# Patient Record
Sex: FEMALE | Race: BLACK OR AFRICAN AMERICAN | NOT HISPANIC OR LATINO | Employment: UNEMPLOYED | URBAN - METROPOLITAN AREA
[De-identification: names, ages, dates, MRNs, and addresses within clinical notes are randomized per-mention and may not be internally consistent; named-entity substitution may affect disease eponyms.]

---

## 2017-11-08 ENCOUNTER — TELEPHONE (OUTPATIENT)
Dept: GASTROENTEROLOGY | Facility: CLINIC | Age: 52
End: 2017-11-08

## 2017-11-09 ENCOUNTER — OFFICE VISIT (OUTPATIENT)
Dept: GASTROENTEROLOGY | Facility: CLINIC | Age: 52
End: 2017-11-09
Payer: COMMERCIAL

## 2017-11-09 VITALS
HEART RATE: 66 BPM | DIASTOLIC BLOOD PRESSURE: 68 MMHG | BODY MASS INDEX: 49.34 KG/M2 | WEIGHT: 293 LBS | SYSTOLIC BLOOD PRESSURE: 144 MMHG

## 2017-11-09 DIAGNOSIS — Z12.11 SCREENING FOR COLON CANCER: ICD-10-CM

## 2017-11-09 DIAGNOSIS — R10.13 EPIGASTRIC PAIN: Primary | ICD-10-CM

## 2017-11-09 PROCEDURE — 99204 OFFICE O/P NEW MOD 45 MIN: CPT | Mod: S$GLB,,, | Performed by: INTERNAL MEDICINE

## 2017-11-09 PROCEDURE — 99999 PR PBB SHADOW E&M-EST. PATIENT-LVL III: CPT | Mod: PBBFAC,,, | Performed by: INTERNAL MEDICINE

## 2017-11-09 NOTE — PATIENT INSTRUCTIONS
Patient scheduled for EGD/COLONOSCOPY with Dr. Crow on  12/13/2017.  Miralax Split Prep instructions explained to patient . Lab will call two days before with arrival time at [facility]. Make sure to have someone to drive you home after procedure.  Verbalized understanding.

## 2017-11-09 NOTE — PROGRESS NOTES
Subjective:       Patient ID: Raquel Finch is a 52 y.o. female.    Chief Complaint: GI Problem    This is a 52-year-old female here for evaluation of epigastric pain.  She has intermittent epigastric pain, slightly to the left which occurs intermittently lasting for up to an hour.  She does have a history of peptic ulcer disease diagnosed on an EGD about 5 years ago.  Occasional NSAID usage.  No melena, hematochezia.  No other exacerbating or relieving factors or other associated symptoms.  Sharp and moderate intensity.  She also has a history of 3 colon polyps removed 5 years ago with colonoscopy due now.    The following portions of the patient's history were reviewed and updated as appropriate: allergies, current medications, past family history, past medical history, past social history, past surgical history and problem list.    (Portions of this note were dictated using voice recognition software and may contain dictation related errors in spelling/grammar/syntax not found on text review)    HPI  Review of Systems   Constitutional: Negative for appetite change, chills and fever.   HENT: Negative for postnasal drip and trouble swallowing.    Eyes: Negative for pain and redness.   Respiratory: Negative for cough, choking, chest tightness and shortness of breath.    Cardiovascular: Negative for chest pain and leg swelling.   Gastrointestinal: Positive for abdominal pain and constipation. Negative for abdominal distention, anal bleeding, blood in stool, diarrhea and vomiting.   Endocrine: Negative for cold intolerance and heat intolerance.   Genitourinary: Negative for difficulty urinating and hematuria.   Musculoskeletal: Negative for arthralgias and back pain.   Skin: Negative for color change and pallor.   Allergic/Immunologic: Negative for environmental allergies and food allergies.   Neurological: Negative for dizziness and light-headedness.   Hematological: Negative for adenopathy. Does not bruise/bleed  easily.   Psychiatric/Behavioral: Negative for agitation and behavioral problems.       Objective:      Physical Exam   Constitutional: She is oriented to person, place, and time. She appears well-developed and well-nourished. No distress.   HENT:   Head: Normocephalic and atraumatic.   Eyes: Conjunctivae are normal. No scleral icterus.   Neck: Normal range of motion. Neck supple. No tracheal deviation present. No thyromegaly present.   Cardiovascular: Normal rate and regular rhythm.  Exam reveals no gallop and no friction rub.    No murmur heard.  Pulmonary/Chest: Effort normal and breath sounds normal. No respiratory distress. She has no wheezes.   Abdominal: Soft. Bowel sounds are normal. She exhibits no distension. There is no tenderness.   Musculoskeletal:        Right wrist: She exhibits normal range of motion and no tenderness.        Left wrist: She exhibits normal range of motion and no tenderness.   Lymphadenopathy:        Head (right side): No submental and no submandibular adenopathy present.        Head (left side): No submental and no submandibular adenopathy present.   Neurological: She is alert and oriented to person, place, and time.   Skin: Skin is warm and dry. No rash noted. She is not diaphoretic. No erythema.   Psychiatric: She has a normal mood and affect. Her behavior is normal.   Nursing note and vitals reviewed.      Labs; reviewed  Assessment:       1. Epigastric pain    2. Screening for colon cancer        Plan:   1. Minimize NSAID usage  2. PPI  3. Surveillance colonoscopy  4. EGD

## 2017-12-13 ENCOUNTER — HOSPITAL ENCOUNTER (OUTPATIENT)
Facility: HOSPITAL | Age: 52
Discharge: HOME | End: 2017-12-13
Attending: INTERNAL MEDICINE | Admitting: INTERNAL MEDICINE
Payer: COMMERCIAL

## 2017-12-13 ENCOUNTER — ANESTHESIA EVENT (OUTPATIENT)
Dept: ENDOSCOPY | Facility: HOSPITAL | Age: 52
End: 2017-12-13
Payer: COMMERCIAL

## 2017-12-13 ENCOUNTER — ANESTHESIA (OUTPATIENT)
Dept: ENDOSCOPY | Facility: HOSPITAL | Age: 52
End: 2017-12-13
Payer: COMMERCIAL

## 2017-12-13 VITALS
OXYGEN SATURATION: 97 % | SYSTOLIC BLOOD PRESSURE: 122 MMHG | WEIGHT: 293 LBS | DIASTOLIC BLOOD PRESSURE: 78 MMHG | BODY MASS INDEX: 44.41 KG/M2 | HEIGHT: 68 IN | TEMPERATURE: 99 F | RESPIRATION RATE: 20 BRPM | HEART RATE: 76 BPM

## 2017-12-13 DIAGNOSIS — Z12.11 SCREENING FOR MALIGNANT NEOPLASM OF COLON: ICD-10-CM

## 2017-12-13 PROCEDURE — 37000008 HC ANESTHESIA 1ST 15 MINUTES: Performed by: INTERNAL MEDICINE

## 2017-12-13 PROCEDURE — G0105 COLORECTAL SCRN; HI RISK IND: HCPCS | Mod: ,,, | Performed by: INTERNAL MEDICINE

## 2017-12-13 PROCEDURE — 88305 TISSUE EXAM BY PATHOLOGIST: CPT | Mod: 26,,, | Performed by: PATHOLOGY

## 2017-12-13 PROCEDURE — 25000003 PHARM REV CODE 250: Performed by: NURSE ANESTHETIST, CERTIFIED REGISTERED

## 2017-12-13 PROCEDURE — 43239 EGD BIOPSY SINGLE/MULTIPLE: CPT | Mod: 51,,, | Performed by: INTERNAL MEDICINE

## 2017-12-13 PROCEDURE — 37000009 HC ANESTHESIA EA ADD 15 MINS: Performed by: INTERNAL MEDICINE

## 2017-12-13 PROCEDURE — 43239 EGD BIOPSY SINGLE/MULTIPLE: CPT | Performed by: INTERNAL MEDICINE

## 2017-12-13 PROCEDURE — 25000003 PHARM REV CODE 250: Performed by: INTERNAL MEDICINE

## 2017-12-13 PROCEDURE — 88305 TISSUE EXAM BY PATHOLOGIST: CPT | Performed by: PATHOLOGY

## 2017-12-13 PROCEDURE — G0121 COLON CA SCRN NOT HI RSK IND: HCPCS | Performed by: INTERNAL MEDICINE

## 2017-12-13 PROCEDURE — 63600175 PHARM REV CODE 636 W HCPCS: Performed by: NURSE ANESTHETIST, CERTIFIED REGISTERED

## 2017-12-13 PROCEDURE — G0105 COLORECTAL SCRN; HI RISK IND: HCPCS | Performed by: INTERNAL MEDICINE

## 2017-12-13 PROCEDURE — 27201012 HC FORCEPS, HOT/COLD, DISP: Performed by: INTERNAL MEDICINE

## 2017-12-13 RX ORDER — SODIUM CHLORIDE 9 MG/ML
INJECTION, SOLUTION INTRAVENOUS CONTINUOUS
Status: DISCONTINUED | OUTPATIENT
Start: 2017-12-13 | End: 2017-12-13 | Stop reason: HOSPADM

## 2017-12-13 RX ORDER — PROPOFOL 10 MG/ML
VIAL (ML) INTRAVENOUS CONTINUOUS PRN
Status: DISCONTINUED | OUTPATIENT
Start: 2017-12-13 | End: 2017-12-13

## 2017-12-13 RX ORDER — OMEPRAZOLE 40 MG/1
40 CAPSULE, DELAYED RELEASE ORAL DAILY
Qty: 30 CAPSULE | Refills: 11 | Status: SHIPPED | OUTPATIENT
Start: 2017-12-13 | End: 2018-01-22

## 2017-12-13 RX ORDER — PROPOFOL 10 MG/ML
VIAL (ML) INTRAVENOUS
Status: DISCONTINUED | OUTPATIENT
Start: 2017-12-13 | End: 2017-12-13

## 2017-12-13 RX ORDER — GLYCOPYRROLATE 0.2 MG/ML
INJECTION INTRAMUSCULAR; INTRAVENOUS
Status: DISCONTINUED | OUTPATIENT
Start: 2017-12-13 | End: 2017-12-13

## 2017-12-13 RX ORDER — LIDOCAINE HCL/PF 100 MG/5ML
SYRINGE (ML) INTRAVENOUS
Status: DISCONTINUED | OUTPATIENT
Start: 2017-12-13 | End: 2017-12-13

## 2017-12-13 RX ADMIN — GLYCOPYRROLATE 0.2 MG: 0.2 INJECTION, SOLUTION INTRAMUSCULAR; INTRAVENOUS at 09:12

## 2017-12-13 RX ADMIN — LIDOCAINE HYDROCHLORIDE 60 MG: 20 INJECTION, SOLUTION INTRAVENOUS at 09:12

## 2017-12-13 RX ADMIN — SODIUM CHLORIDE: 0.9 INJECTION, SOLUTION INTRAVENOUS at 09:12

## 2017-12-13 RX ADMIN — TOPICAL ANESTHETIC 1 EACH: 200 SPRAY DENTAL; PERIODONTAL at 09:12

## 2017-12-13 RX ADMIN — PROPOFOL 70 MG: 10 INJECTION, EMULSION INTRAVENOUS at 09:12

## 2017-12-13 RX ADMIN — PROPOFOL 150 MCG/KG/MIN: 10 INJECTION, EMULSION INTRAVENOUS at 09:12

## 2017-12-13 NOTE — ANESTHESIA PREPROCEDURE EVALUATION
12/13/2017  Raquel Finch is a 52 y.o., female.    Anesthesia Evaluation      I have reviewed the Medications.     Review of Systems  Anesthesia Hx:  No problems with previous Anesthesia Denies Family Hx of Anesthesia complications.    Cardiovascular:   Hypertension    Pulmonary:   Asthma mild        Physical Exam  General:  Well nourished, Obesity    Airway/Jaw/Neck:  Airway Findings: Mouth Opening: Normal Tongue: Normal  General Airway Assessment: Adult  Mallampati: II      Dental:  Dental Findings: In tact    Chest/Lungs:  Chest/Lungs Findings: Clear to auscultation, Normal Respiratory Rate     Heart/Vascular:  Heart Findings: Rate: Normal  Rhythm: Regular Rhythm        Mental Status:  Mental Status Findings:  Cooperative, Alert and Oriented         Anesthesia Plan  Type of Anesthesia, risks & benefits discussed:  Anesthesia Type:  MAC  Patient's Preference: MAC  Intra-op Monitoring Plan:   Intra-op Monitoring Plan Comments:   Post Op Pain Control Plan:   Post Op Pain Control Plan Comments:   Induction:   IV  Beta Blocker:         Informed Consent: Patient understands risks and agrees with Anesthesia plan.  Questions answered. Anesthesia consent signed with patient.  ASA Score: 2     Day of Surgery Review of History & Physical:            Ready For Surgery From Anesthesia Perspective.

## 2017-12-13 NOTE — H&P
[facility]  Gastroenterology  H&P    Patient Name: Raquel Finch  MRN: [id number]  Admission Date: 12/13/2017  Code Status: No Order    Attending Provider: Laxmi Crow MD   Primary Care Physician: Fan Rivera PharmD  Principal Problem:<principal problem not specified>    Subjective:     History of Present Illness: Epigastric pain, colon cancer screening    Past Medical History:   Diagnosis Date    Anemia     Elevated CPK     Hypertension        No past surgical history on file.    Review of patient's allergies indicates:  No Known Allergies  Family History     None        Social History Main Topics    Smoking status: Never Smoker    Smokeless tobacco: Never Used    Alcohol use Yes    Drug use: No    Sexual activity: Not on file     Review of Systems   Constitutional: Negative for activity change and appetite change.   Cardiovascular: Negative for chest pain.   Gastrointestinal: Positive for abdominal pain. Negative for constipation.     Objective:     Vital Signs (Most Recent):    Vital Signs (24h Range):           There is no height or weight on file to calculate BMI.    No intake or output data in the 24 hours ending 12/13/17 0836    Lines/Drains/Airways          No matching active lines, drains, or airways          Physical Exam   Constitutional: She is oriented to person, place, and time. She appears well-developed and well-nourished. No distress.   HENT:   Head: Normocephalic and atraumatic.   Eyes: Conjunctivae are normal. No scleral icterus.   Neck: Normal range of motion. Neck supple. No tracheal deviation present. No thyromegaly present.   Cardiovascular: Normal rate and regular rhythm.  Exam reveals no gallop and no friction rub.    No murmur heard.  Pulmonary/Chest: Effort normal and breath sounds normal. No respiratory distress. She has no wheezes.   Abdominal: Soft. Bowel sounds are normal. She exhibits no distension. There is no tenderness.   Musculoskeletal:         Right wrist: She exhibits normal range of motion and no tenderness.        Left wrist: She exhibits normal range of motion and no tenderness.   Lymphadenopathy:        Head (right side): No submental and no submandibular adenopathy present.        Head (left side): No submental and no submandibular adenopathy present.   Neurological: She is alert and oriented to person, place, and time.   Skin: Skin is warm and dry. No rash noted. She is not diaphoretic. No erythema.   Psychiatric: She has a normal mood and affect. Her behavior is normal.   Nursing note and vitals reviewed.          Assessment/Plan:     Active Diagnoses:    Diagnosis Date Noted POA    Screening for malignant neoplasm of colon [Z12.11] 12/13/2017 Not Applicable      Problems Resolved During this Admission:    Diagnosis Date Noted Date Resolved POA       1. Screening colonoscopy  2. EGD for epigastric pain    Laxmi Crow MD  Gastroenterology  [facility]

## 2017-12-13 NOTE — DISCHARGE INSTRUCTIONS
Discharge Summary/Instructions for EGD and Colonoscopy  Raquel LITTLE [facility]  12/13/2017  Laxmi Crow MD    Restrictions on Activity:    - Do not drive car or operate machinery until the day after the procedure.  - The following day: return to full activity including work.  - For 3 days: No heavy lifting, straining or running.  - Diet: Eat and drink normally unless instructed otherwise.    Treatment for Common Side Effects:  - Mild abdominal pain and bloating or excessive gas: rest, eat lightly and use a heating pad.   -Sore Throat - treat with throat lozenges, gargle with warm salt water.    Symptoms to watch for and report to your physician:  1. Severe abdominal pain.  2. Fever within 24 hours after a procedure.  3. A large amount of rectal bleeding. (A small amount of blood from the rectum is not serious, especially if hemorrhoids are present.)  4. Because air was put into your colon during the procedure, expelling large amount of air from your rectum is normal.  5. You may not have a bowel movement for 1-3 days because of the colonoscopy prep. This is normal.  6. Go directly to the emergency room if you notice any of the following:     Chills and/orfever over 101   Persistent vomiting   Severe abdominal pain, other than gas cramps   Severe chest pain   Black, tarry stools   Any bleeding - exceeding one tablespoon    If you have any questions or problems, please call your Physician:    Laxmi Crow MD    Lab Results: Contact Physician's Office    If a complication or emergency situation arises and you are unable to reach your Physician - GO TO THE EMERGENCY ROOM.

## 2017-12-13 NOTE — ANESTHESIA POSTPROCEDURE EVALUATION
"Anesthesia Post Evaluation    Patient: Raquel Finch    Procedure(s) Performed: Procedure(s) (LRB):  ESOPHAGOGASTRODUODENOSCOPY (EGD) (N/A)  COLONOSCOPY (N/A)    Final Anesthesia Type: MAC  Patient location during evaluation: GI PACU  Patient participation: Yes- Able to Participate  Level of consciousness: awake and alert and oriented  Pain management: adequate  Airway patency: patent  PONV status at discharge: No PONV  Anesthetic complications: no      Cardiovascular status: blood pressure returned to baseline and hemodynamically stable  Respiratory status: unassisted, room air and spontaneous ventilation  Hydration status: euvolemic  Follow-up not needed.        Visit Vitals  /80 (BP Location: Right arm, Patient Position: Lying)   Pulse 78   Temp 36.5 °C (97.7 °F) (Oral)   Resp 20   Ht 5' 8" (1.727 m)   Wt (!) 144.7 kg (319 lb)   SpO2 97%   Breastfeeding? No   BMI 48.50 kg/m²       Pain/Grayson Score: Pain Assessment Performed: Yes (12/13/2017 10:19 AM)  Presence of Pain: denies (12/13/2017 10:19 AM)  Grayson Score: 10 (12/13/2017 10:19 AM)      "

## 2017-12-26 ENCOUNTER — TELEPHONE (OUTPATIENT)
Dept: GASTROENTEROLOGY | Facility: CLINIC | Age: 52
End: 2017-12-26

## 2017-12-26 DIAGNOSIS — K25.9 GASTRIC ULCER, UNSPECIFIED CHRONICITY, UNSPECIFIED WHETHER GASTRIC ULCER HEMORRHAGE OR PERFORATION PRESENT: Primary | ICD-10-CM

## 2017-12-26 NOTE — TELEPHONE ENCOUNTER
----- Message from Amrita Kendrick sent at 12/26/2017 12:23 PM CST -----  Contact: Self. [phone] Leave detailed message  Patient is returning your call. She wants the procedure scheduled for 1/11/18 and has additional questions. Please advise.

## 2017-12-26 NOTE — TELEPHONE ENCOUNTER
Informed pt of her Bx results. Pt has c/o acid reflux after procedure. Pt has been advised to take PPI. Pt has a f/u scheduled on 1-4-18 with MD. She has also been scheduled for a repeat EGD on 1-23-18 with Dr. Crow. Verbal Understanding.

## 2017-12-26 NOTE — TELEPHONE ENCOUNTER
----- Message from Laxmi Crow MD sent at 12/22/2017  1:30 PM CST -----  H.pylori negative, suspect ulcer due to NSAIDs. Continue PPI. Repeat EGD in 4-6 weeks to ensure ulcer healing. If still with pain a ruq u/s can be done as well

## 2017-12-26 NOTE — TELEPHONE ENCOUNTER
Left a detailed message informing pt of her Pathology results. Pt will need to be scheduled for a repeat EGD in 4-6 weeks.

## 2017-12-26 NOTE — TELEPHONE ENCOUNTER
----- Message from Ailyn Espino sent at 12/26/2017 10:40 AM CST -----  Contact: Self/ [phone]  Patient called in returning your call. She asked to please leave a detail message since she is not able to answer her phone while at work.     Please call and advise.

## 2018-01-08 ENCOUNTER — TELEPHONE (OUTPATIENT)
Dept: GASTROENTEROLOGY | Facility: CLINIC | Age: 53
End: 2018-01-08

## 2018-01-08 NOTE — TELEPHONE ENCOUNTER
Patient continues to have reflux symptoms not alleviated by omeprazole.  Rescheduled EGD to 01/11/2018.

## 2018-01-11 ENCOUNTER — ANESTHESIA EVENT (OUTPATIENT)
Dept: ENDOSCOPY | Facility: HOSPITAL | Age: 53
End: 2018-01-11
Payer: COMMERCIAL

## 2018-01-11 ENCOUNTER — ANESTHESIA (OUTPATIENT)
Dept: ENDOSCOPY | Facility: HOSPITAL | Age: 53
End: 2018-01-11
Payer: COMMERCIAL

## 2018-01-11 ENCOUNTER — SURGERY (OUTPATIENT)
Age: 53
End: 2018-01-11

## 2018-01-11 ENCOUNTER — HOSPITAL ENCOUNTER (OUTPATIENT)
Facility: HOSPITAL | Age: 53
Discharge: HOME | End: 2018-01-11
Attending: INTERNAL MEDICINE | Admitting: INTERNAL MEDICINE
Payer: COMMERCIAL

## 2018-01-11 DIAGNOSIS — Z12.11 SCREENING FOR MALIGNANT NEOPLASM OF COLON: ICD-10-CM

## 2018-01-11 PROCEDURE — 43239 EGD BIOPSY SINGLE/MULTIPLE: CPT | Performed by: INTERNAL MEDICINE

## 2018-01-11 PROCEDURE — 43239 EGD BIOPSY SINGLE/MULTIPLE: CPT | Mod: ,,, | Performed by: INTERNAL MEDICINE

## 2018-01-11 PROCEDURE — 25000003 PHARM REV CODE 250: Performed by: NURSE ANESTHETIST, CERTIFIED REGISTERED

## 2018-01-11 PROCEDURE — 37000008 HC ANESTHESIA 1ST 15 MINUTES: Performed by: INTERNAL MEDICINE

## 2018-01-11 PROCEDURE — 63600175 PHARM REV CODE 636 W HCPCS: Performed by: NURSE ANESTHETIST, CERTIFIED REGISTERED

## 2018-01-11 PROCEDURE — 88305 TISSUE EXAM BY PATHOLOGIST: CPT | Mod: 26,,, | Performed by: PATHOLOGY

## 2018-01-11 PROCEDURE — 88305 TISSUE EXAM BY PATHOLOGIST: CPT | Performed by: PATHOLOGY

## 2018-01-11 PROCEDURE — 27201012 HC FORCEPS, HOT/COLD, DISP: Performed by: INTERNAL MEDICINE

## 2018-01-11 PROCEDURE — 37000009 HC ANESTHESIA EA ADD 15 MINS: Performed by: INTERNAL MEDICINE

## 2018-01-11 PROCEDURE — 25000003 PHARM REV CODE 250: Performed by: INTERNAL MEDICINE

## 2018-01-11 RX ORDER — PROPOFOL 10 MG/ML
VIAL (ML) INTRAVENOUS
Status: DISCONTINUED | OUTPATIENT
Start: 2018-01-11 | End: 2018-01-11

## 2018-01-11 RX ORDER — LIDOCAINE HCL/PF 100 MG/5ML
SYRINGE (ML) INTRAVENOUS
Status: DISCONTINUED | OUTPATIENT
Start: 2018-01-11 | End: 2018-01-11

## 2018-01-11 RX ORDER — SODIUM CHLORIDE 9 MG/ML
INJECTION, SOLUTION INTRAVENOUS CONTINUOUS
Status: DISCONTINUED | OUTPATIENT
Start: 2018-01-11 | End: 2018-01-11 | Stop reason: HOSPADM

## 2018-01-11 RX ORDER — KETAMINE HYDROCHLORIDE 50 MG/ML
INJECTION, SOLUTION INTRAMUSCULAR; INTRAVENOUS
Status: DISCONTINUED | OUTPATIENT
Start: 2018-01-11 | End: 2018-01-11

## 2018-01-11 RX ORDER — PROPOFOL 10 MG/ML
VIAL (ML) INTRAVENOUS CONTINUOUS PRN
Status: DISCONTINUED | OUTPATIENT
Start: 2018-01-11 | End: 2018-01-11

## 2018-01-11 RX ADMIN — TOPICAL ANESTHETIC 1 EACH: 200 SPRAY DENTAL; PERIODONTAL at 09:01

## 2018-01-11 RX ADMIN — PROPOFOL 150 MCG/KG/MIN: 10 INJECTION, EMULSION INTRAVENOUS at 09:01

## 2018-01-11 RX ADMIN — PROPOFOL 50 MG: 10 INJECTION, EMULSION INTRAVENOUS at 09:01

## 2018-01-11 RX ADMIN — SODIUM CHLORIDE: 0.9 INJECTION, SOLUTION INTRAVENOUS at 09:01

## 2018-01-11 RX ADMIN — LIDOCAINE HYDROCHLORIDE 80 MG: 20 INJECTION, SOLUTION INTRAVENOUS at 09:01

## 2018-01-11 RX ADMIN — LIDOCAINE HYDROCHLORIDE 100 MG: 20 INJECTION, SOLUTION INTRAVENOUS at 09:01

## 2018-01-11 RX ADMIN — KETAMINE HYDROCHLORIDE 50 MG: 50 INJECTION, SOLUTION, CONCENTRATE INTRAMUSCULAR; INTRAVENOUS at 09:01

## 2018-01-11 NOTE — PLAN OF CARE
Past Medical History:   Diagnosis Date    Anemia     Arthritis     Colon polyps 04/2012    Elevated CPK     Encounter for blood transfusion     Hypertension     Kidney stones     PUD (peptic ulcer disease)      Accompanied by sister, ok to speak to her re results of exam.

## 2018-01-11 NOTE — PLAN OF CARE
Past Medical History:   Diagnosis Date    Anemia     Arthritis     Colon polyps 04/2012    Elevated CPK     Encounter for blood transfusion     Hypertension     Kidney stones     PUD (peptic ulcer disease)      Dr. Crow visited at bedside, discussed findings and recommendations with patient and family member; all questions asked and answered. Verbalized understanding of information give. Handout provided at time of discharge.

## 2018-01-11 NOTE — DISCHARGE INSTRUCTIONS
Post EGD  Discharge Instructions:     Raquel LITTLE [facility]  1/11/2018  Laxmi Crow MD    RESTRICTIONS ON ACTIVITY:  -DO NOT drive a car or operate machinery, make critical decisions, or do activities that require coordination or balance for 24 hours.  -Following Day: Return to full activities including work.  -Diet: Eat and drink normally unless instructed otherwise.    TREATMENT FOR COMMON SIDE EFFECTS:  *Sore Throat - treat with throat lozenges, gargle with warm salt water.  *Mild abdominal pain & bloating- rest and take liquids only.    SYMPTOMS TO WATCH FOR AND REPORT TO YOUR PHYSICIAN:  1. Chills or fever occurring 24 hours after procedure.  2. Pain in chest.  3. SEVERE abdominal pain or bloating.  4. Rectal bleeding which could be maroon or black.    If you have any questions or problems, please call your Physician:    Laxmi Crow MD      If a complication or emergency situation arises and you are unable to reach your Physician - GO TO THE EMERGENCY ROOM.

## 2018-01-11 NOTE — H&P (VIEW-ONLY)
[facility]  Gastroenterology  H&P    Patient Name: Raquel Finch  MRN: [id number]  Admission Date: 12/13/2017  Code Status: No Order    Attending Provider: Laxmi Crow MD   Primary Care Physician: Fan Rivera PharmD  Principal Problem:<principal problem not specified>    Subjective:     History of Present Illness: Epigastric pain, colon cancer screening    Past Medical History:   Diagnosis Date    Anemia     Elevated CPK     Hypertension        No past surgical history on file.    Review of patient's allergies indicates:  No Known Allergies  Family History     None        Social History Main Topics    Smoking status: Never Smoker    Smokeless tobacco: Never Used    Alcohol use Yes    Drug use: No    Sexual activity: Not on file     Review of Systems   Constitutional: Negative for activity change and appetite change.   Cardiovascular: Negative for chest pain.   Gastrointestinal: Positive for abdominal pain. Negative for constipation.     Objective:     Vital Signs (Most Recent):    Vital Signs (24h Range):           There is no height or weight on file to calculate BMI.    No intake or output data in the 24 hours ending 12/13/17 0836    Lines/Drains/Airways          No matching active lines, drains, or airways          Physical Exam   Constitutional: She is oriented to person, place, and time. She appears well-developed and well-nourished. No distress.   HENT:   Head: Normocephalic and atraumatic.   Eyes: Conjunctivae are normal. No scleral icterus.   Neck: Normal range of motion. Neck supple. No tracheal deviation present. No thyromegaly present.   Cardiovascular: Normal rate and regular rhythm.  Exam reveals no gallop and no friction rub.    No murmur heard.  Pulmonary/Chest: Effort normal and breath sounds normal. No respiratory distress. She has no wheezes.   Abdominal: Soft. Bowel sounds are normal. She exhibits no distension. There is no tenderness.   Musculoskeletal:         Right wrist: She exhibits normal range of motion and no tenderness.        Left wrist: She exhibits normal range of motion and no tenderness.   Lymphadenopathy:        Head (right side): No submental and no submandibular adenopathy present.        Head (left side): No submental and no submandibular adenopathy present.   Neurological: She is alert and oriented to person, place, and time.   Skin: Skin is warm and dry. No rash noted. She is not diaphoretic. No erythema.   Psychiatric: She has a normal mood and affect. Her behavior is normal.   Nursing note and vitals reviewed.          Assessment/Plan:     Active Diagnoses:    Diagnosis Date Noted POA    Screening for malignant neoplasm of colon [Z12.11] 12/13/2017 Not Applicable      Problems Resolved During this Admission:    Diagnosis Date Noted Date Resolved POA       1. Screening colonoscopy  2. EGD for epigastric pain    Laxmi Crow MD  Gastroenterology  [facility]

## 2018-01-11 NOTE — TRANSFER OF CARE
"Anesthesia Transfer of Care Note    Patient: Raquel Finch    Procedure(s) Performed: Procedure(s) (LRB):  ESOPHAGOGASTRODUODENOSCOPY (EGD) (N/A)    Patient location: GI    Anesthesia Type: MAC    Transport from OR: Transported from OR on room air with adequate spontaneous ventilation    Post pain: adequate analgesia    Post assessment: no apparent anesthetic complications and tolerated procedure well    Post vital signs: stable    Level of consciousness: awake, alert and oriented    Nausea/Vomiting: no nausea/vomiting    Complications: none          Last vitals:   Visit Vitals  /84   Pulse (!) 58   Temp 36.4 °C (97.6 °F) (Oral)   Resp (!) 23   Ht 5' 9" (1.753 m)   Wt (!) 144.7 kg (319 lb)   SpO2 96%   Breastfeeding? No   BMI 47.11 kg/m²     "

## 2018-01-11 NOTE — ANESTHESIA POSTPROCEDURE EVALUATION
"Anesthesia Post Evaluation    Patient: Raquel Finch    Procedure(s) Performed: Procedure(s) (LRB):  ESOPHAGOGASTRODUODENOSCOPY (EGD) (N/A)    Final Anesthesia Type: MAC  Patient location during evaluation: GI PACU  Patient participation: Yes- Able to Participate  Level of consciousness: awake and alert and oriented  Post-procedure vital signs: reviewed and stable  Pain management: adequate  Airway patency: patent  PONV status at discharge: No PONV  Anesthetic complications: no      Cardiovascular status: blood pressure returned to baseline and hemodynamically stable  Respiratory status: unassisted  Hydration status: euvolemic  Follow-up not needed.        Visit Vitals  /84   Pulse (!) 58   Temp 36.4 °C (97.6 °F) (Oral)   Resp (!) 23   Ht 5' 9" (1.753 m)   Wt (!) 144.7 kg (319 lb)   SpO2 96%   Breastfeeding? No   BMI 47.11 kg/m²       Pain/Grayson Score: Presence of Pain: denies (1/11/2018  8:46 AM)      "

## 2018-01-12 VITALS
WEIGHT: 293 LBS | HEART RATE: 57 BPM | DIASTOLIC BLOOD PRESSURE: 78 MMHG | OXYGEN SATURATION: 99 % | SYSTOLIC BLOOD PRESSURE: 149 MMHG | HEIGHT: 69 IN | BODY MASS INDEX: 43.4 KG/M2 | TEMPERATURE: 98 F | RESPIRATION RATE: 20 BRPM

## 2018-01-17 ENCOUNTER — TELEPHONE (OUTPATIENT)
Dept: GASTROENTEROLOGY | Facility: CLINIC | Age: 53
End: 2018-01-17

## 2018-01-17 NOTE — TELEPHONE ENCOUNTER
Results given to patient. Patient verbalized understanding. Will continue omeprazole for now.  Will call back if needed.

## 2018-01-17 NOTE — TELEPHONE ENCOUNTER
----- Message from Laxmi Crow MD sent at 1/17/2018 12:18 PM CST -----  Mild chronic esophageal inflammation; peptic ulcer disease has healed. If still with symptoms I'd change her omeprazole to pantoprazole or nexium and f/u in 4 weeks

## 2018-01-22 ENCOUNTER — TELEPHONE (OUTPATIENT)
Dept: GASTROENTEROLOGY | Facility: CLINIC | Age: 53
End: 2018-01-22

## 2018-01-22 RX ORDER — ESOMEPRAZOLE MAGNESIUM 40 MG/1
40 CAPSULE, DELAYED RELEASE ORAL
COMMUNITY
End: 2020-12-23 | Stop reason: CLARIF

## 2018-02-15 ENCOUNTER — TELEPHONE (OUTPATIENT)
Dept: GASTROENTEROLOGY | Facility: CLINIC | Age: 53
End: 2018-02-15

## 2018-02-15 NOTE — TELEPHONE ENCOUNTER
Informed patient that Dr. Crow does not perform Gastric Sleeve surgery.  Patient will check with PCP for referral. Reflux symptoms are better but continues to have symptoms. Will call back in a few weeks if she would like an appointment.

## 2018-02-15 NOTE — TELEPHONE ENCOUNTER
----- Message from Samara Cool sent at 2/15/2018 12:03 PM CST -----  Contact: [phone]/self  Patient requesting to speak with you regarding a referral. Please advise.

## 2018-06-28 ENCOUNTER — NUTRITION (OUTPATIENT)
Dept: NUTRITION | Facility: CLINIC | Age: 53
End: 2018-06-28
Payer: COMMERCIAL

## 2018-06-28 DIAGNOSIS — E66.9 OBESITY, UNSPECIFIED CLASSIFICATION, UNSPECIFIED OBESITY TYPE, UNSPECIFIED WHETHER SERIOUS COMORBIDITY PRESENT: ICD-10-CM

## 2018-06-28 DIAGNOSIS — I10 HYPERTENSION, UNSPECIFIED TYPE: ICD-10-CM

## 2018-06-28 DIAGNOSIS — Z71.3 DIETARY COUNSELING AND SURVEILLANCE: Primary | ICD-10-CM

## 2018-06-28 PROCEDURE — 97802 MEDICAL NUTRITION INDIV IN: CPT | Mod: S$GLB,,, | Performed by: DIETITIAN, REGISTERED

## 2018-06-28 NOTE — PROGRESS NOTES
Nutrition Assessment for Medical Nutrition Therapy    Referring professional: Dr. Neves for Gastric Sleeve   Tentatively scheduled for 2nd or 3rd week in November     Reason for MNT visit: Pt in for education and nutrition counseling regarding dietary assessment, education & counseling regarding obesity and need to lose weight along with pre-gastric sleeve lifestyle modifications and dietary guidance.     Medical History: HTN per patient     Medications:   Current Outpatient Prescriptions:     esomeprazole (NEXIUM) 40 MG capsule, Take 40 mg by mouth before breakfast., Disp: , Rfl:     hydrochlorothiazide (HYDRODIURIL) 12.5 MG Tab, Take 12.5 mg by mouth once daily., Disp: , Rfl:     montelukast (SINGULAIR) 10 mg tablet, Take 10 mg by mouth every evening., Disp: , Rfl:     naproxen (NAPROSYN) 500 MG tablet, Take 1 tablet (500 mg total) by mouth 2 (two) times daily with meals., Disp: 20 tablet, Rfl: 0    nebivolol (BYSTOLIC) 10 MG Tab, Take 10 mg by mouth once daily., Disp: , Rfl:     Labs: CMP from 2017 shows elevated glucose at 133    Wt: 323 lbs   She reports losing 3 lbs since April and shares that she's gained 100 lbs in the last 10 years.     Ht:  5'8    BMI: 49.1        Nutrition History       Food allergies  intolerances: pork - headache     Dining out: very frequent    Beverages:   2-3 cokes per day  2 crystal light hui during the day (16 ounces each)  Another bottle of water that night    Typical day recall:    Up between 5:30-5:45am    Working 5 days per week, which has changed in the last two weeks as it used to be 4- 10 hour days.    8:15-8:30am breakfast: 2 boiled eggs + 3-4 strips of turkey lorenzana in last 2 weeks before was grits, eggs, sausage ramiro from [employer] or [employer]    12:45-1:15pm  Lunch: quiznos honey mustard 1/2 salad sometimes baked sweet potato brought from home  [place] chicken salad with strawberries   Poppeyes: 3 wings + red beans + coke   Chicken derrell a  Sushi      chocolatae pudding or activia or starbucks mocha coffee if have a snack- but then doesn't eat lunch but then starving in the evening     6-6:30pm   Home: whatever  brings to eat   Or   when get home if  is out of town  ()  Seafood platter  Fried chicken, pork chop + scrambled egg   Baked chicken, boiled turkey necks, if cooking at home   3 times per week out    Not big on rice, maybe 1-2 times per month    7-8:30 bed      Supplements:  Fish oil, cod liver oil pill, B12, flax seed,     Recommendations/Interventions:   Reminders:   ? Try to eat breakfast within 1-2 hours of waking up  ? Try not to skip any meals or snacks, not going more than 3-4 hours without eating.   ? At each meal and snack, try to include a source of fiber + lean protein + healthy fat.   ? Consider limiting carbohydrates 15-20 grams (1 serving) per meal and snack, omitting as able throughout meals and snacks each day, especially as surgery approaches    ? Eliminate sugary + calorie-rich beverages like cokes. Alternatives recommended/discussed   ? Reduce eating out to include making more foods at home also needing to meal plan, grocery shop etc  ? Exercise 60 minutes most days  ? Aim for a minimum of 7-8 hours sleep nightly    Breakfast    Protein drink   o Orgain, Premier, or Iconic   2 eggs muffins    Any snack option    Snacks  Low carb:   Chicken salad + celery or cucumbers   Tuna salad + carrots   Hard boiled eggs   Deviled eggs   Protein shake with protein powder or Ready-to-drink beverage + ice + unsweetened almond milk blended   Nutrient-rich carb options:   Fruit of choice (1 piece or size of a tennis ball, i.e. orange, pear, peach, etc or 1 cup melon/berries) + protein:  o nuts (2 tablespoons or 100 calorie pack)   o nut butter (1 tablespoon)  o cheese (reduced fat, light, part-skim, 2% cheese options)  o beef jerky (see grocery list for recommended brands)  o hard boiled egg/s (1 yolk)    Flavored  Greek Yogurt   o Chobani simply 100  o Dannon oikos triple zero  o Chobani simply 100 crunch   10-12 Beanitos + salsa or laughing cow wedge or 100 calorie guacamole/avocado cup    Chocolate protein muffins  Grab-n-go:  Protein bar    o Nature Valley Protein Bar  o Quest  o Powercrunch  o Detour SMART   o Think Thin  Chocolate protein muffins    lunch (leftovers) + dinner    4- 6 ounces lean protein + unlimited non starchy veggie + optional fiber-rich carb choice (really omitting this as able over the next few months to prep for surgery)  Plate method portions shown for protein 1st, veggies 2nd, and carb last if at all    Click hyperlinks for sample recipes     Broccoli + Chicken tami - made with zucchini noodles instead of pasta + greek yogurt- lemon- black pepper- instead of tami (similar and here)    Bell pepper nachos    Rut[place] Manuels Pie    Pot Roast    Slow Cooker:  o Flank Steak Fajitas   o Pulled Pork Tenderloin: with or slaw or salad - the slaw is slightly tangy with the Greek yogurt + kale. Can use a store-bought version   o Shredded Chicken Tacos       Lagniappe/Beverages:   Crystal Light Pure   True Lemon   Powerade Zero   Vitaminwater zero      Written Materials Provided  These resources are intended to assist the patient in making it easier to choose recommended options when eating out & to identify better-for-you brands at the grocery store:     Customized initial meal plan    Eat Fit flor card as a reminder to download the flor to ones smart phone which provides: current Eat Fit partners, approved menu options, food labels for carb counting, & recipes    Fast Food Lite Guide   Eat Fit Grocery Product list    RD contact information  [facility] Nutrition Book       Comprehension: good ; patient verbalizes understanding of the dietary recommendations discussed as well as the need for additional follow ups to monitor progress.     Motivation to change: moderate-  high     Follow-up: 6-8 weeks to assess meal plan adherence, weight loss progress, exercise choices, beverage swaps, meal planning and preparing vs eating out, and protein, carb and fat intake    Counseling time: 1 hour 45 minutes

## 2018-07-02 NOTE — PATIENT INSTRUCTIONS
Reminders:   ? Try to eat breakfast within 1-2 hours of waking up  ? Try not to skip any meals or snacks, not going more than 3-4 hours without eating.   ? At each meal and snack, try to include a source of fiber + lean protein + healthy fat.   ? Consider limiting carbohydrates 15-20 grams (1 serving) per meal and snack, omitting as able throughout meals and snacks, especially as surgery approaches    ? Eliminate the sugary + calorie-rich beverages like cokes. Alternatives recommended at the bottom   ? Reduce eating out to include making more foods at home also needing to meal plan, grocery shop etc.   ? Exercise 60 minutes most days  ? Aim for a minimum of 7-8 hours sleep nightly    Breakfast    Protein drink   o Orgain, Premier, or Iconic   2 eggs muffins    Any snack option    Snacks  Low carb:   Chicken salad + celery or cucumbers   Tuna salad + carrots   Hard boiled eggs   Deviled eggs   Protein shake with protein powder or Ready-to-drink beverage + ice + unsweetened almond milk blended   Nutrient-rich carb options:   Fruit of choice (1 piece or size of a tennis ball, i.e. orange, pear, peach, etc or 1 cup melon/berries) + protein:  o nuts (2 tablespoons or 100 calorie pack)   o nut butter (1 tablespoon)  o cheese (reduced fat, light, part-skim, 2% cheese options)  o beef jerky (see grocery list for recommended brands)  o hard boiled egg/s (1 yolk)    Flavored Greek Yogurt   o Chobani simply 100  o Dannon oikos triple zero  o Chobani simply 100 crunch   10-12 Beanitos + salsa or laughing cow wedge or 100 calorie guacamole/avocado cup    Chocolate protein muffins  Grab-n-go:  Protein bar    o Nature Valley Protein Bar  o Quest  o Powercrunch  o Detour SMART   o Think Thin      lunch (leftovers) + dinner    4- 6 ounces lean protein + unlimited non starchy veggie + optional fiber-rich carb choice (really omitting this as able over the next few months to prep for surgery)    Click hyperlinks for sample  recipes     Broccoli + Chicken tami - made with zucchini noodles instead of pasta + greek yogurt- lemon- black pepper- instead of tami (similar and here)    Bell pepper nachos    [place] Manuels Pie    Pot Roast    Slow Cooker:  o Flank Steak Neftali   o Pulled Pork Tenderloin: with or slaw or salad - the slaw is slightly tangy with the Greek yogurt + kale. Can use a store-bought version   o Shredded Chicken Tacos       Lagniappe:   Crystal Light Pure   True Lemon   Powerade Zero   Vitaminwater zero   Chocolate protein muffins

## 2018-08-08 ENCOUNTER — TELEPHONE (OUTPATIENT)
Dept: GASTROENTEROLOGY | Facility: HOSPITAL | Age: 53
End: 2018-08-08

## 2018-08-08 DIAGNOSIS — E66.9 OBESITY, UNSPECIFIED CLASSIFICATION, UNSPECIFIED OBESITY TYPE, UNSPECIFIED WHETHER SERIOUS COMORBIDITY PRESENT: Primary | ICD-10-CM

## 2018-08-08 NOTE — TELEPHONE ENCOUNTER
----- Message from Estela Silveira sent at 8/6/2018  8:34 AM CDT -----  Could you please place a [employer] referral for your patient so she can start a workout routine.     Thanks,   Estela

## 2018-10-12 ENCOUNTER — NUTRITION (OUTPATIENT)
Dept: NUTRITION | Facility: CLINIC | Age: 53
End: 2018-10-12
Payer: COMMERCIAL

## 2018-10-12 DIAGNOSIS — E66.9 OBESITY, UNSPECIFIED CLASSIFICATION, UNSPECIFIED OBESITY TYPE, UNSPECIFIED WHETHER SERIOUS COMORBIDITY PRESENT: Primary | ICD-10-CM

## 2018-10-12 DIAGNOSIS — I10 HYPERTENSION, UNSPECIFIED TYPE: ICD-10-CM

## 2018-10-12 DIAGNOSIS — Z71.3 DIETARY COUNSELING AND SURVEILLANCE: ICD-10-CM

## 2018-10-12 PROCEDURE — 97803 MED NUTRITION INDIV SUBSEQ: CPT | Mod: S$GLB,,, | Performed by: DIETITIAN, REGISTERED

## 2018-10-12 NOTE — PROGRESS NOTES
"Nutrition Assessment for Initial Medical Nutrition Therapy    Referring professional: self, insurance, Dr. Neves     Reason for MNT visit: Pt in for education and nutrition counseling regarding dietary counseling + surveillance for gastric sleeve procedure.       ASSESSMENT    Age: 53 y.o.  Wt:  315 lbs today (8 lbs since initial consult on 6/28/18)    Ht:   Ht Readings from Last 1 Encounters:   01/11/18 5' 9" (1.753 m)     BMI: 46.5    Clinical signs/symptoms: abdominal adiposity    Medical History:   Past Medical History:   Diagnosis Date    Anemia     Arthritis     Colon polyps 04/2012    Elevated CPK     Encounter for blood transfusion     Hypertension     Kidney stones     PUD (peptic ulcer disease)      Problem List           Resolved    Screening for malignant neoplasm of colon               Medications:   Current Outpatient Medications:     esomeprazole (NEXIUM) 40 MG capsule, Take 40 mg by mouth before breakfast., Disp: , Rfl:     hydrochlorothiazide (HYDRODIURIL) 12.5 MG Tab, Take 12.5 mg by mouth once daily., Disp: , Rfl:     montelukast (SINGULAIR) 10 mg tablet, Take 10 mg by mouth every evening., Disp: , Rfl:     naproxen (NAPROSYN) 500 MG tablet, Take 1 tablet (500 mg total) by mouth 2 (two) times daily with meals., Disp: 20 tablet, Rfl: 0    nebivolol (BYSTOLIC) 10 MG Tab, Take 10 mg by mouth once daily., Disp: , Rfl:     Supplements: fish oil, cod liver oil, Mg, B12, garlic, flax seed     Food/medication interactions:   Nexium- avoid gingko or SJW  Hydrochlorothiazide- avoid natural licorice; caution with Ca and/or Vit D, risk of hypercalcemia; may need lower Na, low Ca, increase K + Mg  Naproxen - avoid alcohol  nebivolol- avoid natural licorice, low Na + Ca may be recommended       Food allergies  intolerances: pork     Labs: CMP from 2017 shows elevated glucose at 133    Typical day recall:  Patient reports losing 13 lbs (was 15 lbs, but gained two back she stated)    Up at " 4:34am  5:20-5:35 morning workout  Protein shake    After workout  7:15am (when gets to work)  Breakfast: coffee + caramel premier protein shake + truvia  Boiled egg or 2 eggs fried in avocado coconut oil (1 tbsp)    Always keeps fruit for snacks: grapes, berries, kiwi, pineapple or cantalope     Sometimes not hungry until off work at 3:45 but then is starving     11-11:45  1 cup of tuna or teriyaki chicken breast from [employer] with avocado   Eggs, cheese, 1/2 avocado, pecans (just a sprinkle of each), 3 sliced beets  Spring mix salad    Evening  2 fried eggs or 2 boiled eggs  Maybe a piece of chicken  When works out for pool aerobics in the evening, she reports having water + protein shake sometimes 2 protein shakes + boiled egg because she is starving    Beverages:   Patient stated she hasn't had any cokes   Diet peak tea (once a week or every other week)  2 L water per day     Dining out: protein first: example: shrimp + oysters  Lemon water  Once or twice every 2 weeks when  is home    Alcohol: 1 beer since we last met    Lifestyle Influences  Support System: self, , kids + grandkids     Meal preparation/shopping: self    Current Activity Level:   2 hour water aerobics 4:30-6:15pm class on Monday + Wednesday (water + protein shake-or  2 protein shake + boiled egg because she is starving after)    At the gym 5:45am on Monday, Tuesday, Wednesday for cardio -  20 minutes on treadmill + 20-30 minutes weight training    Patient motivation, anticipated barriers, expected compliance: Patient verbalizes understanding, has shown progress towards recommended goals, and seems willing to continue to comply with recommendations    DIAGNOSIS   Problem: Overweight/obesity  Etiology: food and nutrition related knowledge deficit  Signs/Symptoms: BMI - obesity class III  40+     INTERVENTION  Nutrition prescription: estimated energy requirements: ~1500 calories per day       Recommendations & Goals:  Patient goals and  recommendations are tailored to the specific patient's needs, readiness to change, lifestyle, culture, skills, resources, & abilities. Strategies to help achieve these nutrition-related goals were discussed which can include but are not limited to SMART goal setting & mindful eating.    Continue with current plan:    ? Try to eat breakfast within 1-2 hours of waking up  ? Try not to skip any meals or snacks, not going more than 3-4 hours without eating ** reinforced since she is extra hungry after workouts  **  ? At each meal and snack, try to include a source of fiber + lean protein + healthy fat.   ? Consider limiting carbohydrates 15-20 grams (1 serving) per meal and snack, omitting as able throughout meals and snacks each day, especially as surgery approaches    ? Eliminate sugary + calorie-rich beverages like cokes. Alternatives recommended/discussed   ? Reduce eating out to include making more foods at home also needing to meal plan, grocery shop etc  ? Exercise 60 minutes most days  ? Aim for a minimum of 7-8 hours sleep nightly  ? Reminded she needs to bring protein-rich snacks with her on busy days with the grandkids at sporting events or upcoming cruise- I.e. planning + packing is essential   ? Be mindful of amount of fat content used when cooking (i.e. Butter, olive oil, avocado) 1 tsp per meal is recommended, 1/4 avocado OR 1 tbsp nuts or cheese on salad  ? Utilize bariatric handbook to implement supplement regime as well as adjust to lower carb intake pre/post surgery         MONITORING & EVALUATION    Communicated with healthcare provider: will fax initial + follow-up note once completed to Dr. Neves for assessment and review     Documented plan for referral to appropriate agency/healthcare provider as needed: Referred patient to medical fitness program after initial consult, she is currently enrolled + utilizing the program for her exercise regime    Comprehension: good     Motivation to change:  moderate    Follow-up: 2-4 weeks to review pre/post needs for gastric sleeve     Counseling time: 45 minutes

## 2019-01-07 RX ORDER — OMEPRAZOLE 40 MG/1
CAPSULE, DELAYED RELEASE ORAL
Qty: 30 CAPSULE | Refills: 0 | Status: SHIPPED | OUTPATIENT
Start: 2019-01-07 | End: 2023-01-23

## 2020-09-19 NOTE — TRANSFER OF CARE
"Anesthesia Transfer of Care Note    Patient: Raquel Finch    Procedure(s) Performed: Procedure(s) (LRB):  ESOPHAGOGASTRODUODENOSCOPY (EGD) (N/A)  COLONOSCOPY (N/A)    Patient location: GI    Anesthesia Type: MAC    Transport from OR: Transported from OR on room air with adequate spontaneous ventilation    Post pain: adequate analgesia    Post assessment: no apparent anesthetic complications and tolerated procedure well    Post vital signs: stable    Level of consciousness: awake, alert and oriented    Nausea/Vomiting: no nausea/vomiting    Complications: none    Transfer of care protocol was followed      Last vitals:   Visit Vitals  /80 (BP Location: Right arm, Patient Position: Lying)   Pulse 78   Temp 36.5 °C (97.7 °F) (Oral)   Resp 20   Ht 5' 8" (1.727 m)   Wt (!) 144.7 kg (319 lb)   SpO2 97%   Breastfeeding? No   BMI 48.50 kg/m²     "
Calm

## 2020-12-11 ENCOUNTER — TELEPHONE (OUTPATIENT)
Dept: ADMINISTRATIVE | Facility: OTHER | Age: 55
End: 2020-12-11

## 2020-12-18 DIAGNOSIS — M25.511 ACUTE PAIN OF RIGHT SHOULDER: Primary | ICD-10-CM

## 2020-12-23 ENCOUNTER — OFFICE VISIT (OUTPATIENT)
Dept: ORTHOPEDICS | Facility: CLINIC | Age: 55
End: 2020-12-23
Payer: MEDICAID

## 2020-12-23 ENCOUNTER — HOSPITAL ENCOUNTER (EMERGENCY)
Facility: HOSPITAL | Age: 55
Discharge: HOME | End: 2020-12-23
Attending: EMERGENCY MEDICINE
Payer: MEDICAID

## 2020-12-23 VITALS
BODY MASS INDEX: 33.04 KG/M2 | SYSTOLIC BLOOD PRESSURE: 148 MMHG | HEIGHT: 68 IN | HEART RATE: 77 BPM | RESPIRATION RATE: 19 BRPM | WEIGHT: 218 LBS | OXYGEN SATURATION: 100 % | DIASTOLIC BLOOD PRESSURE: 79 MMHG | TEMPERATURE: 98 F

## 2020-12-23 VITALS
WEIGHT: 218.63 LBS | DIASTOLIC BLOOD PRESSURE: 84 MMHG | HEIGHT: 68 IN | SYSTOLIC BLOOD PRESSURE: 128 MMHG | HEART RATE: 65 BPM | BODY MASS INDEX: 33.14 KG/M2

## 2020-12-23 DIAGNOSIS — G89.29 CHRONIC RIGHT SHOULDER PAIN: ICD-10-CM

## 2020-12-23 DIAGNOSIS — M25.511 CHRONIC RIGHT SHOULDER PAIN: ICD-10-CM

## 2020-12-23 DIAGNOSIS — U07.1 COVID-19 VIRUS DETECTED: ICD-10-CM

## 2020-12-23 DIAGNOSIS — M75.111 PARTIAL NONTRAUMATIC RUPTURE OF RIGHT ROTATOR CUFF: Primary | ICD-10-CM

## 2020-12-23 DIAGNOSIS — U07.1 COVID-19 VIRUS DETECTED: Primary | ICD-10-CM

## 2020-12-23 DIAGNOSIS — R05.9 COUGH: ICD-10-CM

## 2020-12-23 LAB
CTP QC/QA: YES
SARS-COV-2 RDRP RESP QL NAA+PROBE: POSITIVE

## 2020-12-23 PROCEDURE — 99999 PR PBB SHADOW E&M-EST. PATIENT-LVL IV: ICD-10-PCS | Mod: PBBFAC,,, | Performed by: PHYSICIAN ASSISTANT

## 2020-12-23 PROCEDURE — 20610 DRAIN/INJ JOINT/BURSA W/O US: CPT | Mod: S$PBB,RT,, | Performed by: PHYSICIAN ASSISTANT

## 2020-12-23 PROCEDURE — 99203 PR OFFICE/OUTPT VISIT, NEW, LEVL III, 30-44 MIN: ICD-10-PCS | Mod: S$PBB,25,, | Performed by: PHYSICIAN ASSISTANT

## 2020-12-23 PROCEDURE — 99214 OFFICE O/P EST MOD 30 MIN: CPT | Mod: PBBFAC,PN,25 | Performed by: PHYSICIAN ASSISTANT

## 2020-12-23 PROCEDURE — 20610 DRAIN/INJ JOINT/BURSA W/O US: CPT | Mod: PBBFAC,PN | Performed by: PHYSICIAN ASSISTANT

## 2020-12-23 PROCEDURE — 99282 EMERGENCY DEPT VISIT SF MDM: CPT | Mod: 25,27

## 2020-12-23 PROCEDURE — 20610 LARGE JOINT ASPIRATION/INJECTION: R SUBACROMIAL BURSA: ICD-10-PCS | Mod: S$PBB,RT,, | Performed by: PHYSICIAN ASSISTANT

## 2020-12-23 PROCEDURE — U0002 COVID-19 LAB TEST NON-CDC: HCPCS | Performed by: EMERGENCY MEDICINE

## 2020-12-23 PROCEDURE — 99203 OFFICE O/P NEW LOW 30 MIN: CPT | Mod: S$PBB,25,, | Performed by: PHYSICIAN ASSISTANT

## 2020-12-23 PROCEDURE — 99999 PR PBB SHADOW E&M-EST. PATIENT-LVL IV: CPT | Mod: PBBFAC,,, | Performed by: PHYSICIAN ASSISTANT

## 2020-12-23 RX ORDER — ATORVASTATIN CALCIUM 20 MG/1
TABLET, FILM COATED ORAL
COMMUNITY
Start: 2020-11-24

## 2020-12-23 RX ORDER — LIDOCAINE HYDROCHLORIDE 10 MG/ML
2 INJECTION INFILTRATION; PERINEURAL
Status: DISCONTINUED | OUTPATIENT
Start: 2020-12-23 | End: 2020-12-23 | Stop reason: HOSPADM

## 2020-12-23 RX ORDER — TRIAMCINOLONE ACETONIDE 40 MG/ML
40 INJECTION, SUSPENSION INTRA-ARTICULAR; INTRAMUSCULAR
Status: DISCONTINUED | OUTPATIENT
Start: 2020-12-23 | End: 2020-12-23 | Stop reason: HOSPADM

## 2020-12-23 RX ORDER — BUPIVACAINE HYDROCHLORIDE 5 MG/ML
10 INJECTION, SOLUTION PERINEURAL
Status: DISCONTINUED | OUTPATIENT
Start: 2020-12-23 | End: 2020-12-23 | Stop reason: HOSPADM

## 2020-12-23 RX ADMIN — LIDOCAINE HYDROCHLORIDE 2 ML: 10 INJECTION, SOLUTION INFILTRATION; PERINEURAL at 11:12

## 2020-12-23 RX ADMIN — BUPIVACAINE HYDROCHLORIDE 10 ML: 5 INJECTION, SOLUTION PERINEURAL at 11:12

## 2020-12-23 RX ADMIN — TRIAMCINOLONE ACETONIDE 40 MG: 40 INJECTION, SUSPENSION INTRA-ARTICULAR; INTRAMUSCULAR at 11:12

## 2020-12-23 NOTE — PROCEDURES
Large Joint Aspiration/Injection: R subacromial bursa    Date/Time: 12/23/2020 11:00 AM  Performed by: Cyndy Goldman PA-C  Authorized by: Cyndy Goldman PA-C     Consent Done?:  Yes (Verbal)  Indications:  Pain  Site marked: the procedure site was marked    Timeout: prior to procedure the correct patient, procedure, and site was verified    Prep: patient was prepped and draped in usual sterile fashion      Local anesthesia used?: Yes    Local anesthetic:  Topical anesthetic    Details:  Needle Size:  22 G  Ultrasonic Guidance for needle placement?: No    Location:  Shoulder  Site:  R subacromial bursa  Medications:  10 mL bupivacaine 0.5 % (5 mg/mL); 2 mL lidocaine HCL 10 mg/ml (1%) 10 mg/mL (1 %); 40 mg triamcinolone acetonide 40 mg/mL  Patient tolerance:  Patient tolerated the procedure well with no immediate complications

## 2020-12-23 NOTE — LETTER
December 23, 2020      Wilmer Rivera MD  [address]  [place] [address]           [facility]  [address]  [place] [id number]          Patient: Raquel Finch   MR Number: [id number]   YOB: 1965   Date of Visit: 12/23/2020       Dear Dr. Wilmer Rivera:    Thank you for referring Raquel Finch to me for evaluation. Attached you will find relevant portions of my assessment and plan of care.    If you have questions, please do not hesitate to call me. I look forward to following Raquel Finch along with you.    Sincerely,    Cyndy Goldman PA-C    Enclosure  CC:  No Recipients    If you would like to receive this communication electronically, please contact [email or web address] or [phone] to request more information on [facility] access.    For providers and/or their staff who would like to refer a patient to [facility], please contact us through our one-stop-shop provider referral line, [facility] , at [phone].    If you feel you have received this communication in error or would no longer like to receive these types of communications, please e-mail [email or web address]

## 2020-12-23 NOTE — PROGRESS NOTES
Subjective:      Patient ID: Raquel Finch is a 55 y.o. female.    Chief Complaint: Pain of the Right Shoulder      55 year old RHD female presents with 2-3 years of superior right shoulder pain. No injury. Pain is worsened with cold weather and when she sleeps on her right side. Does not have complaints when lifting, reaching, grabbing. Pain does not radiate and she denies numbness/tingling sensation. Also denies neck pain. She was seeing her PCP who was giving her steroid injections into her deltoid which helped for about 1 mon. She also has been taking naproxen, ibuprofen and lidocaine patches which offer minimal relief and cause GI upset.       Review of Systems   Constitution: Negative for chills and fever.   Cardiovascular: Negative for chest pain.   Respiratory: Negative for cough.    Musculoskeletal: Positive for joint pain and myalgias. Negative for stiffness.   Gastrointestinal: Negative for abdominal pain.   Genitourinary: Negative for bladder incontinence.   Neurological: Negative for dizziness, loss of balance and weakness.   Psychiatric/Behavioral: Negative for altered mental status.       Review of patient's allergies indicates:  No Known Allergies     Current Outpatient Medications   Medication Sig Dispense Refill    esomeprazole (NEXIUM) 40 MG capsule Take 40 mg by mouth before breakfast.      hydrochlorothiazide (HYDRODIURIL) 12.5 MG Tab Take 12.5 mg by mouth once daily.      montelukast (SINGULAIR) 10 mg tablet Take 10 mg by mouth every evening.      naproxen (NAPROSYN) 500 MG tablet Take 1 tablet (500 mg total) by mouth 2 (two) times daily with meals. 20 tablet 0    nebivolol (BYSTOLIC) 10 MG Tab Take 10 mg by mouth once daily.      omeprazole (PRILOSEC) 40 MG capsule TAKE ONE CAPSULE BY MOUTH ONCE DAILY 30 capsule 0     No current facility-administered medications for this visit.         The patient's relevant past medical, surgical, and social history was reviewed in Epic.      "  Objective:      VITAL SIGNS: /84   Pulse 65   Ht 5' 8" (1.727 m)   Wt 99.2 kg (218 lb 9.6 oz)   BMI 33.24 kg/m²     General    Constitutional: She is oriented to person, place, and time. She appears well-developed and well-nourished.   Neurological: She is alert and oriented to person, place, and time. She has normal reflexes.         Right Shoulder Exam   Right shoulder exam is normal.    Inspection/Observation   Swelling: absent    Tenderness   The patient is tender to palpation of the supraspinatus.    Range of Motion   Active abduction:  170 normal   Passive abduction:  170 normal   Forward Flexion:  170 normal   External Rotation 0 degrees:  90 normal   Internal rotation 0 degrees: T6     Tests & Signs   Duval test: negative  Impingement: negative  Active Compression Test ([place]'s Sign): negative  Speed's Test: negative    Other   Sensation: normal    Muscle Strength   Right Upper Extremity   Shoulder Abduction: 5/5   Shoulder Internal Rotation: 5/5   Shoulder External Rotation: 5/5   Supraspinatus: 5/5   Biceps: 5/5     Vascular Exam     Right Pulses      Radial:                    2+             Right shoulder Radiographs 3 views, DIS 8/31/20 reveals:  1. Osteoarthritis is moderate involving the AC joint and minor involving the glenohumeral joint.  2. No fracture, dislocation of other acute process is evident.    MRI without contrast Right Shoulder DIS 10/29/20 reveals:  1.Partial thickness tear involving the distal aspect of the supraspinatus tendon communicating with the joint surface.  2. Tendinopathy vs. intrasubstance tear involving the distal aspect of the infraspinatus tendon and tendinopathy involving the subscapularis tendon.  3. Moderate arthritic changes of the AC joint.  4. Mild amount of fluid in the glenohumeral joint and surrounding the long head of the biceps tendon in the intertubercular groove.  Assessment:       1. Acute pain of right shoulder    2. Partial nontraumatic " rupture of right rotator cuff          Plan:         Raquel was seen today for pain.    Diagnoses and all orders for this visit:    Acute pain of right shoulder    Partial nontraumatic rupture of right rotator cuff      Essentially normal exam despite some superior shoulder tenderness. MRI reveals partial supraspinatus tear. I recommend we try a right shoulder subacromial injection today to see if this relieves her symptoms. I do not believe PT is needed as of right now. Will see how the injection works and go from there. Follow up as needed.         Cyndy Goldman PA-C   12/23/2020

## 2020-12-23 NOTE — Clinical Note
"Raquel "Ragini Finch was seen and treated in our emergency department on 12/23/2020.     COVID-19 is present in our communities across the state. There is limited testing for COVID at this time, so not all patients can be tested. In this situation, your employee meets the following criteria:    Raquel Finch has met the criteria for COVID-19 testing and has a POSITIVE result. She can return to work once they are asymptomatic for 72 hours without the use of fever reducing medications AND at least ten days from the first positive result.     If you have any questions or concerns, or if I can be of further assistance, please do not hesitate to contact me.    Sincerely,             Osito Hooper PA-C"

## 2020-12-24 NOTE — ED PROVIDER NOTES
Encounter Date: 12/23/2020       History     Chief Complaint   Patient presents with    COVID-19 Concerns     cough with fever and sob. + exposure.      55-year-old female presents to ED with concern of COVID after known exposure, 1 day onset cough, subjective fever, chills, and mild shortness of breath with activities.  She denies headache, nasal congestion, sore throat, chest pain, abdominal pain, nausea, vomiting, loss in taste or smell.  She has taken Tylenol with improvement of her symptoms.  She is tolerating oral fluids well. No other acute complaints at this time.       The history is provided by the patient.     Review of patient's allergies indicates:  No Known Allergies  Past Medical History:   Diagnosis Date    Anemia     Arthritis     Colon polyps 04/2012    Elevated CPK     Encounter for blood transfusion     Hypertension     Kidney stones     PUD (peptic ulcer disease)      Past Surgical History:   Procedure Laterality Date    COLONOSCOPY N/A 12/13/2017    Procedure: COLONOSCOPY;  Surgeon: Laxmi Crow MD;  Location: [facility];  Service: Endoscopy;  Laterality: N/A;    COLONOSCOPY W/ POLYPECTOMY      COLONOSCOPY W/ POLYPECTOMY      CYSTOSCOPY W/ STONE MANIPULATION Right 03/02/2017    DILATION AND CURETTAGE OF UTERUS      ESOPHAGOGASTRODUODENOSCOPY      ESOPHAGOGASTRODUODENOSCOPY      TUBAL LIGATION       Family History   Problem Relation Age of Onset    Hypertension Mother     Heart disease Mother     Hypertension Father     Stroke Father      Social History     Tobacco Use    Smoking status: Never Smoker    Smokeless tobacco: Never Used   Substance Use Topics    Alcohol use: No    Drug use: No     Review of Systems   Constitutional: Positive for chills and fever. Negative for fatigue.   HENT: Negative for congestion and sore throat.    Respiratory: Positive for cough and shortness of breath (intermittent, with activities).    Cardiovascular: Negative for chest pain.    Gastrointestinal: Negative for abdominal pain, nausea and vomiting.   Musculoskeletal: Negative for myalgias and neck pain.   Neurological: Negative for dizziness, light-headedness and headaches.       Physical Exam     Initial Vitals [12/23/20 1744]   BP Pulse Resp Temp SpO2   (!) 148/79 77 19 98.2 °F (36.8 °C) 100 %      MAP       --         Physical Exam    Nursing note and vitals reviewed.  Constitutional: She appears well-developed and well-nourished. She is cooperative.  Non-toxic appearance. She does not have a sickly appearance. She does not appear ill. No distress.   HENT:   Head: Normocephalic and atraumatic.   Eyes: Conjunctivae and EOM are normal.   Neck: Normal range of motion.   Cardiovascular: Normal rate, regular rhythm and normal heart sounds.   Pulmonary/Chest: Effort normal and breath sounds normal.   Speaking full sentences with no complications or signs of respiratory distress.  Occasional dry cough noted.  Lungs clear to auscultation bilaterally.   Abdominal: Normal appearance.   Neurological: She is alert. GCS eye subscore is 4. GCS verbal subscore is 5. GCS motor subscore is 6.   Skin: Skin is warm and dry.   Psychiatric: She has a normal mood and affect. Her behavior is normal. Thought content normal.         ED Course   Procedures  Labs Reviewed   SARS-COV-2 RDRP GENE - Abnormal; Notable for the following components:       Result Value    POC Rapid COVID Positive (*)     All other components within normal limits    Narrative:     .[email or web address]          Imaging Results    None          Medical Decision Making:   Differential Diagnosis:   COVID exposure, COVID, URI, viral, allergy/irritant  ED Management:  COVID test    Patient presents with concern of COVID after known exposure, 1 day onset subjective fever, chills, cough, intermittent shortness of breath with activities.  No associated chest pain.  On exam, patient is very well-appearing in no acute distress or labored breathing.  Vitals  grossly WNL, afebrile, and O2 sat 100% on RA.  COVID test is positive. Encouraged oral hydration, Tylenol for headache/fever, continue social distancing/isolation, and monitor sx closely.  ED return precautions discussed.  Patient understands instructions and agrees with plan.                               Clinical Impression:     ICD-10-CM ICD-9-CM   1. COVID-19 virus detected  U07.1 079.89   2. Cough  R05 786.2                          ED Disposition Condition    Discharge Stable        ED Prescriptions     None        Follow-up Information     Follow up With Specialties Details Why Contact Info    Your Doctor  Call                                          Osito Hooper PA-C  12/23/20 1406

## 2020-12-24 NOTE — ED NOTES
Patient advises her  has been sick for several days and had a positive Covid yesterday. The patient has a complaint of fever, body aches and cough x 2 days. She is awake/alert and states she feels generally ill.

## 2020-12-24 NOTE — DISCHARGE INSTRUCTIONS
Drink plenty of water, Tylenol for headache/fever, continue social distancing/isolation, monitor symptoms very closely. Do not return to work or social activities until symptoms have completely resolved for at least 72 hours (3 days). Return to emergency department if symptoms worsen, do not improve, or for any new concerns.

## 2021-12-22 ENCOUNTER — OFFICE VISIT (OUTPATIENT)
Dept: ORTHOPEDICS | Facility: CLINIC | Age: 56
End: 2021-12-22
Payer: MEDICAID

## 2021-12-22 VITALS
SYSTOLIC BLOOD PRESSURE: 135 MMHG | DIASTOLIC BLOOD PRESSURE: 81 MMHG | BODY MASS INDEX: 33.48 KG/M2 | HEIGHT: 68 IN | HEART RATE: 66 BPM | WEIGHT: 220.88 LBS

## 2021-12-22 DIAGNOSIS — M25.511 CHRONIC RIGHT SHOULDER PAIN: ICD-10-CM

## 2021-12-22 DIAGNOSIS — G89.29 CHRONIC RIGHT SHOULDER PAIN: ICD-10-CM

## 2021-12-22 DIAGNOSIS — M75.111 PARTIAL NONTRAUMATIC RUPTURE OF RIGHT ROTATOR CUFF: Primary | ICD-10-CM

## 2021-12-22 PROCEDURE — 20610 DRAIN/INJ JOINT/BURSA W/O US: CPT | Mod: 25,PBBFAC,PN | Performed by: PHYSICIAN ASSISTANT

## 2021-12-22 PROCEDURE — 20610 LARGE JOINT ASPIRATION/INJECTION: R SUBACROMIAL BURSA: ICD-10-PCS | Mod: S$PBB,RT,, | Performed by: PHYSICIAN ASSISTANT

## 2021-12-22 PROCEDURE — 20610 DRAIN/INJ JOINT/BURSA W/O US: CPT | Mod: S$PBB,RT,, | Performed by: PHYSICIAN ASSISTANT

## 2021-12-22 PROCEDURE — 99213 OFFICE O/P EST LOW 20 MIN: CPT | Mod: PBBFAC,PN,25 | Performed by: PHYSICIAN ASSISTANT

## 2021-12-22 PROCEDURE — 99213 PR OFFICE/OUTPT VISIT, EST, LEVL III, 20-29 MIN: ICD-10-PCS | Mod: S$PBB,25,, | Performed by: PHYSICIAN ASSISTANT

## 2021-12-22 PROCEDURE — 99213 OFFICE O/P EST LOW 20 MIN: CPT | Mod: S$PBB,25,, | Performed by: PHYSICIAN ASSISTANT

## 2021-12-22 PROCEDURE — 99999 PR PBB SHADOW E&M-EST. PATIENT-LVL III: CPT | Mod: PBBFAC,,, | Performed by: PHYSICIAN ASSISTANT

## 2021-12-22 PROCEDURE — 99999 PR PBB SHADOW E&M-EST. PATIENT-LVL III: ICD-10-PCS | Mod: PBBFAC,,, | Performed by: PHYSICIAN ASSISTANT

## 2021-12-22 RX ORDER — TRIAMCINOLONE ACETONIDE 40 MG/ML
40 INJECTION, SUSPENSION INTRA-ARTICULAR; INTRAMUSCULAR
Status: DISCONTINUED | OUTPATIENT
Start: 2021-12-22 | End: 2021-12-22 | Stop reason: HOSPADM

## 2021-12-22 RX ORDER — PAROXETINE HYDROCHLORIDE 40 MG/1
40 TABLET, FILM COATED ORAL DAILY
COMMUNITY
Start: 2021-12-09

## 2021-12-22 RX ORDER — TIZANIDINE 4 MG/1
TABLET ORAL
COMMUNITY
End: 2021-12-22

## 2021-12-22 RX ADMIN — TRIAMCINOLONE ACETONIDE 40 MG: 40 INJECTION, SUSPENSION INTRA-ARTICULAR; INTRAMUSCULAR at 08:12

## 2022-01-01 ENCOUNTER — PATIENT MESSAGE (OUTPATIENT)
Dept: ADMINISTRATIVE | Facility: OTHER | Age: 57
End: 2022-01-01
Payer: MEDICAID

## 2022-03-07 ENCOUNTER — OFFICE VISIT (OUTPATIENT)
Dept: PODIATRY | Facility: CLINIC | Age: 57
End: 2022-03-07
Payer: MEDICAID

## 2022-03-07 DIAGNOSIS — S92.534A CLOSED NONDISPLACED FRACTURE OF DISTAL PHALANX OF LESSER TOE OF RIGHT FOOT, INITIAL ENCOUNTER: Primary | ICD-10-CM

## 2022-03-07 PROCEDURE — 99212 OFFICE O/P EST SF 10 MIN: CPT | Mod: PBBFAC,PN | Performed by: PODIATRIST

## 2022-03-07 PROCEDURE — 1159F MED LIST DOCD IN RCRD: CPT | Mod: CPTII,,, | Performed by: PODIATRIST

## 2022-03-07 PROCEDURE — 1160F PR REVIEW ALL MEDS BY PRESCRIBER/CLIN PHARMACIST DOCUMENTED: ICD-10-PCS | Mod: CPTII,,, | Performed by: PODIATRIST

## 2022-03-07 PROCEDURE — 1160F RVW MEDS BY RX/DR IN RCRD: CPT | Mod: CPTII,,, | Performed by: PODIATRIST

## 2022-03-07 PROCEDURE — 99999 PR PBB SHADOW E&M-EST. PATIENT-LVL II: ICD-10-PCS | Mod: PBBFAC,,, | Performed by: PODIATRIST

## 2022-03-07 PROCEDURE — 99999 PR PBB SHADOW E&M-EST. PATIENT-LVL II: CPT | Mod: PBBFAC,,, | Performed by: PODIATRIST

## 2022-03-07 PROCEDURE — 99203 PR OFFICE/OUTPT VISIT, NEW, LEVL III, 30-44 MIN: ICD-10-PCS | Mod: S$PBB,,, | Performed by: PODIATRIST

## 2022-03-07 PROCEDURE — 1159F PR MEDICATION LIST DOCUMENTED IN MEDICAL RECORD: ICD-10-PCS | Mod: CPTII,,, | Performed by: PODIATRIST

## 2022-03-07 PROCEDURE — 99203 OFFICE O/P NEW LOW 30 MIN: CPT | Mod: S$PBB,,, | Performed by: PODIATRIST

## 2022-03-07 NOTE — PROGRESS NOTES
Subjective:      Patient ID: Raquel Finch is a 56 y.o. female.    Chief Complaint: Toe Injury (Right foot 3rd toe fracture )      56 y.o. female presenting with R 3rd toe pain. In surgical shoe today. Was in [place] last weekend with her . Not aware of any injury to her R foot. Noticed pain at 3rd toe during the trip. Pt thought because of cold weather. Pain did not improve even after she gets in town. Was evaluated in ED. xrays noted non displaced 3rd toe fracture. Aching pain. Presenting with richelle splint.  Complains of pain as aching.     Review of Systems   Constitutional: Negative for chills, decreased appetite, fever and malaise/fatigue.   HENT: Negative for congestion, ear discharge and sore throat.    Eyes: Negative for discharge and pain.   Cardiovascular: Negative for chest pain, claudication and leg swelling.   Respiratory: Negative for cough and shortness of breath.    Skin: Negative for color change, nail changes and rash.   Musculoskeletal: Negative for arthritis, joint pain, joint swelling and muscle weakness.        R foot pain     Gastrointestinal: Negative for bloating, abdominal pain, diarrhea, nausea and vomiting.   Genitourinary: Negative for flank pain and hematuria.   Neurological: Negative for headaches, numbness and weakness.   Psychiatric/Behavioral: Negative for altered mental status.             Past Medical History:   Diagnosis Date    Anemia     Arthritis     Colon polyps 04/2012    Elevated CPK     Encounter for blood transfusion     Hypertension     Kidney stones     PUD (peptic ulcer disease)        Past Surgical History:   Procedure Laterality Date    COLONOSCOPY N/A 12/13/2017    Procedure: COLONOSCOPY;  Surgeon: Laxmi Crow MD;  Location: [facility];  Service: Endoscopy;  Laterality: N/A;    COLONOSCOPY W/ POLYPECTOMY      COLONOSCOPY W/ POLYPECTOMY      CYSTOSCOPY W/ STONE MANIPULATION Right 03/02/2017    DILATION AND CURETTAGE OF UTERUS       ESOPHAGOGASTRODUODENOSCOPY      ESOPHAGOGASTRODUODENOSCOPY      TUBAL LIGATION         Family History   Problem Relation Age of Onset    Hypertension Mother     Heart disease Mother     Hypertension Father     Stroke Father        Social History     Socioeconomic History    Marital status:    Tobacco Use    Smoking status: Never Smoker    Smokeless tobacco: Never Used   Substance and Sexual Activity    Alcohol use: No    Drug use: No    Sexual activity: Yes     Partners: Male       Current Outpatient Medications   Medication Sig Dispense Refill    atorvastatin (LIPITOR) 20 MG tablet TK 1 T PO  QHS      hydroCHLOROthiazide (MICROZIDE) 12.5 mg capsule Take 12.5 mg by mouth once daily.      HYDROcodone-acetaminophen (NORCO) 7.5-325 mg per tablet Take 1 tablet by mouth every 6 (six) hours as needed for Pain. 12 tablet 0    ibuprofen (ADVIL,MOTRIN) 800 MG tablet Take 1 tablet (800 mg total) by mouth every 6 (six) hours as needed for Pain. 20 tablet 0    montelukast (SINGULAIR) 10 mg tablet Take 10 mg by mouth every evening.      omeprazole (PRILOSEC) 40 MG capsule TAKE ONE CAPSULE BY MOUTH ONCE DAILY 30 capsule 0    paroxetine (PAXIL) 40 MG tablet Take 40 mg by mouth once daily.       No current facility-administered medications for this visit.       Review of patient's allergies indicates:  No Known Allergies    Vitals:    03/07/22 1446   PainSc:   7       Objective:      Physical Exam  Constitutional:       General: She is not in acute distress.     Appearance: She is well-developed.   HENT:      Nose: Nose normal.   Eyes:      Conjunctiva/sclera: Conjunctivae normal.   Pulmonary:      Effort: Pulmonary effort is normal.   Chest:      Chest wall: No tenderness.   Abdominal:      Tenderness: There is no abdominal tenderness.   Musculoskeletal:      Cervical back: Normal range of motion.   Neurological:      Mental Status: She is alert and oriented to person, place, and time.   Psychiatric:          Behavior: Behavior normal.         Vascular: Distal DP/PT pulses palpable 2/4. CRT < 3 sec to tips of toes. No vericosities noted to LEs. Hair growth present LE, warm to touch LE,   R foot: mild edema noted to 3rd toe.     Dermatologic: No open lesions, lacerations or wounds. Interdigital spaces clean, dry and intact. No erythema, rubor, calor noted LE    Musculoskeletal: MMT 5/5 in DF/PF/Inv/Ev resistance with no reproduction of pain in any direction. Passive range of motion of ankle and pedal joints is painless. No calf tenderness LE, Compartments soft/compressible.  R foot: ttp 3rd toe over distal phalanx.     Neurological: Light touch, proprioception, and sharp/dull sensation are all intact. Protective threshold with the Spring-Wienstein monofilament is intact. Vibratory sensation intact.         Assessment:       Encounter Diagnosis   Name Primary?    Closed nondisplaced fracture of distal phalanx of lesser toe of right foot, initial encounter Yes         Plan:       Raquel was seen today for toe injury.    Diagnoses and all orders for this visit:    Closed nondisplaced fracture of distal phalanx of lesser toe of right foot, initial encounter      I counseled the patient on her conditions, their implications and medical management.    56 y.o. female with R 3rd toe fx.     -R foot xrays. Non displaced fx.   -recommend richelle splint. WBAt in sx shoe for 2-3 more weeks then recommend to transition to normal WB. Elevation with ice recommended.     -I reviewed imaging, clinical history, and diagnosis as above with the patient. I attempted to use layman's terms to educate the patient as well as utilize foot models and/or pictures. I personally went through imaging with the patient.    -The nature of the condition, options for management, as well as potential risks and complications were discussed in detail with patient. Patient was amenable to my recommendations and left my office fully informed and will follow  up as instructed or sooner if necessary.    -f/u prn     Note dictated with voice recognition software, please excuse any grammatical errors.

## 2022-03-09 ENCOUNTER — PATIENT OUTREACH (OUTPATIENT)
Dept: EMERGENCY MEDICINE | Facility: HOSPITAL | Age: 57
End: 2022-03-09
Payer: MEDICAID

## 2022-03-09 NOTE — PROGRESS NOTES
Lacie Woodruff  ED Navigator  Emergency Department    Project: [facility] ED Navigator  Role: Community Health Worker    Date: 03/09/2022  Patient Name: Raquel Finch  MRN: [id number]  PCP: Fan Mccall, PharmD    Assessment:     Raquel Finch is a 56 y.o. female who has presented to ED for right foot pain. Patient has visited the ED 2 times in the past 3 months. Patient did not contact PCP.     ED Navigator Initial Assessment    ED Navigator Enrollment Documentation  Consent to Services  Does patient consent to completing the assessment?: Yes  Contact  Method of Initial Contact: Phone  Transportation  Does the patient have issues with Transportation?: No  Does the patient have transportation to and from healthcare appointments?: Yes  Insurance Coverage  Do you have coverage/adequate coverage?: Yes  Type/kind of coverage: MEDICAID HEALTHY BLUE  Is patient able to afford co-pays/deductibles?: Yes  Is patient able to afford HME or supplies?: Yes  Does patient have an established [facility] PCP?: Yes  Able to access?: Yes  Does the patient have a lack of adequate coverage?: No  Specialist Appointment  Did the patient come to the ED to see a specialist?: No  Does the patient have a pending specialist referral?: No  Does the patient have a specialist appointment made?: No  PCP Follow Up Appointment  Has the patient had an appointment with a primary care provider in the past year?: Yes  Approximate date: 3/7/22  Provider:  (Comment: DR. MCCALL AT [facility])  Does the patient have a follow up appontment with a PCP?: No  When was the last time you saw your PCP?: 3/7/22  Why does the patient not have a follow up scheduled?: Other (see comments) (Comment: AWAITING A RETURN CALL)  Medications  Is patient able to afford medication?: Yes  Is patient unable to get medication due to lack of transportation?: No  Psychological  Does the patient have psycho-social concerns?: No  Food  Does the patient have concerns about food?:  No  Communication/Education  Does the patient have limited English proficiency/English not primary language?: No  Does patient have low literacy and/or low health literacy?: No  Does patient have concerns with care?: No  Does patient have dissatisfaction with care?: No  Other Financial Concerns  Does the patient have immediate financial distress?: No  Does the patient have general financial concerns?: No  Other Social Barriers/Concerns  Does the patient have any additional barriers or concerns?: Work  Primary Barrier  Barriers identified: Structural barrier (service availability, waiting times, etc.)  Root Cause of ED Utilization: Lack of Access to Primary Care  Plan to address Lack of Access to Primary Care: Provided patient with information about the [facility] in their area, Provided [facility] PCP assistance line [phone], Provided information for [facility] ([facility], [facility], etc.), Provided information for [facility] 24/7 Nurse Triage line [phone] or 1-866-[facility] (1-734.355.4623), Provided information on COVID-[place] resources and hotline (182-626-2218)  Next steps: Provided Education  Was education/educational materials provided surrounding PCP services/creating a medical home?: No    Was education/educational materials provided surrounding low cost, healthy foods?: No    Was education/educational materials provided surrounding other items? If so, use comment to explain.: No    Plan: Provided information for [facility] 24/7 Nurse triage line, [phone] or 1-[facility] ([phone])  Expected Date of Follow Up 1: 3/23/22  Additional Documentation: Spoke with patient who presented to the ED for right foot pain. Patient stated she was feeling a bit better but still having some pain. Patient was asked if she had been to see her PCP she stated she had been and was referred to see the podiatrist  on this past Monday, 3/7/22 and will follow up once results are received from her xray. Patient denied needing any other assistance at this time.         Social History     Socioeconomic History    Marital status:    Tobacco Use    Smoking status: Never Smoker    Smokeless tobacco: Never Used   Substance and Sexual Activity    Alcohol use: No    Drug use: No    Sexual activity: Yes     Partners: Male     Social Determinants of Health     Financial Resource Strain: Low Risk     Difficulty of Paying Living Expenses: Not very hard   Food Insecurity: No Food Insecurity    Worried About Running Out of Food in the Last Year: Never true    Ran Out of Food in the Last Year: Never true   Transportation Needs: No Transportation Needs    Lack of Transportation (Medical): No    Lack of Transportation (Non-Medical): No   Physical Activity: Inactive    Days of Exercise per Week: 0 days    Minutes of Exercise per Session: 0 min   Stress: No Stress Concern Present    Feeling of Stress : Only a little   Social Connections: Unknown    Frequency of Communication with Friends and Family: More than three times a week    Frequency of Social Gatherings with Friends and Family: More than three times a week    Attends Sabianist Services: Patient refused    Active Member of Clubs or Organizations: No    Attends Club or Organization Meetings: Never    Marital Status:    Housing Stability: Unknown    Unable to Pay for Housing in the Last Year: No    Unstable Housing in the Last Year: No       Plan:     Spoke with patient who presented to the ED for right foot pain. Patient stated she was feeling a bit better but still having some pain. Patient was asked if she had been to see her PCP she stated she had been and was referred to see the podiatrist on this past Monday, 3/7/22 and will follow up once results are received from her xray. Patient denied needing any other assistance at this time.      Appointment  made with: Fan Rivera, PharmD

## 2022-03-23 ENCOUNTER — PATIENT OUTREACH (OUTPATIENT)
Dept: EMERGENCY MEDICINE | Facility: HOSPITAL | Age: 57
End: 2022-03-23
Payer: MEDICAID

## 2022-03-23 NOTE — PROGRESS NOTES
Spoke with patient and she stated she was doing ok, but still feeling some pain and discomfort with her toe. Patient stated she will need to make an appointment when she returns home. Patient stated she does get some relief when she wraps a splint around her toe and wears oversized socks. Patient denied needing any other assistance at this time.

## 2022-03-28 ENCOUNTER — TELEPHONE (OUTPATIENT)
Dept: PODIATRY | Facility: CLINIC | Age: 57
End: 2022-03-28
Payer: MEDICAID

## 2022-03-28 NOTE — TELEPHONE ENCOUNTER
----- Message from Igor Marc sent at 3/28/2022  9:36 AM CDT -----  Type:  Sooner Apoointment Request    Caller is requesting a sooner appointment.    Name of Caller:Raquel Finch  When is the first available appointment 05/04/22  Symptoms:right foot third toe tenderness and pain  Would the patient rather a call back or a response via [facility]? Call back  Best Call Back Number:[phone]  Additional Information: Patient requesting a sooner appointment this week due to right foot third toe tenderness and pain

## 2022-03-30 ENCOUNTER — HOSPITAL ENCOUNTER (OUTPATIENT)
Dept: RADIOLOGY | Facility: HOSPITAL | Age: 57
Discharge: HOME | End: 2022-03-30
Attending: FAMILY MEDICINE
Payer: MEDICAID

## 2022-03-30 DIAGNOSIS — M79.671 RIGHT FOOT PAIN: ICD-10-CM

## 2022-03-30 PROCEDURE — 73630 X-RAY EXAM OF FOOT: CPT | Mod: TC,FY,RT

## 2022-03-30 PROCEDURE — 73630 XR FOOT COMPLETE 3 VIEW RIGHT: ICD-10-PCS | Mod: 26,RT,, | Performed by: RADIOLOGY

## 2022-03-30 PROCEDURE — 73630 X-RAY EXAM OF FOOT: CPT | Mod: 26,RT,, | Performed by: RADIOLOGY

## 2022-04-01 ENCOUNTER — OFFICE VISIT (OUTPATIENT)
Dept: PODIATRY | Facility: CLINIC | Age: 57
End: 2022-04-01
Payer: MEDICAID

## 2022-04-01 VITALS — BODY MASS INDEX: 33.84 KG/M2 | HEIGHT: 68 IN | WEIGHT: 223.31 LBS

## 2022-04-01 DIAGNOSIS — S92.534D CLOSED NONDISPLACED FRACTURE OF DISTAL PHALANX OF LESSER TOE OF RIGHT FOOT WITH ROUTINE HEALING, SUBSEQUENT ENCOUNTER: Primary | ICD-10-CM

## 2022-04-01 PROCEDURE — 3008F PR BODY MASS INDEX (BMI) DOCUMENTED: ICD-10-PCS | Mod: CPTII,,, | Performed by: PODIATRIST

## 2022-04-01 PROCEDURE — 99213 OFFICE O/P EST LOW 20 MIN: CPT | Mod: PBBFAC,PN | Performed by: PODIATRIST

## 2022-04-01 PROCEDURE — 1160F PR REVIEW ALL MEDS BY PRESCRIBER/CLIN PHARMACIST DOCUMENTED: ICD-10-PCS | Mod: CPTII,,, | Performed by: PODIATRIST

## 2022-04-01 PROCEDURE — 99999 PR PBB SHADOW E&M-EST. PATIENT-LVL III: CPT | Mod: PBBFAC,,, | Performed by: PODIATRIST

## 2022-04-01 PROCEDURE — 1160F RVW MEDS BY RX/DR IN RCRD: CPT | Mod: CPTII,,, | Performed by: PODIATRIST

## 2022-04-01 PROCEDURE — 1159F PR MEDICATION LIST DOCUMENTED IN MEDICAL RECORD: ICD-10-PCS | Mod: CPTII,,, | Performed by: PODIATRIST

## 2022-04-01 PROCEDURE — 99999 PR PBB SHADOW E&M-EST. PATIENT-LVL III: ICD-10-PCS | Mod: PBBFAC,,, | Performed by: PODIATRIST

## 2022-04-01 PROCEDURE — 99213 OFFICE O/P EST LOW 20 MIN: CPT | Mod: S$PBB,,, | Performed by: PODIATRIST

## 2022-04-01 PROCEDURE — 3008F BODY MASS INDEX DOCD: CPT | Mod: CPTII,,, | Performed by: PODIATRIST

## 2022-04-01 PROCEDURE — 99213 PR OFFICE/OUTPT VISIT, EST, LEVL III, 20-29 MIN: ICD-10-PCS | Mod: S$PBB,,, | Performed by: PODIATRIST

## 2022-04-01 PROCEDURE — 1159F MED LIST DOCD IN RCRD: CPT | Mod: CPTII,,, | Performed by: PODIATRIST

## 2022-04-01 RX ORDER — IBANDRONATE SODIUM 150 MG/1
150 TABLET, FILM COATED ORAL
COMMUNITY
Start: 2022-03-11

## 2022-04-01 RX ORDER — AZITHROMYCIN 250 MG/1
250 TABLET, FILM COATED ORAL
COMMUNITY
Start: 2022-04-01 | End: 2023-01-12 | Stop reason: CLARIF

## 2022-04-01 RX ORDER — GABAPENTIN 300 MG/1
300 CAPSULE ORAL NIGHTLY
COMMUNITY
Start: 2022-03-28

## 2022-04-01 NOTE — PROGRESS NOTES
Subjective:      Patient ID: Raquel Finch is a 57 y.o. female.    Chief Complaint: Toe Pain (LEFT FOOT, 3RD TOE)      57 y.o. female presenting with R 3rd toe pain. In surgical shoe today. Was in [place] last weekend with her . Not aware of any injury to her R foot. Noticed pain at 3rd toe during the trip. Pt thought because of cold weather. Pain did not improve even after she gets in town. Was evaluated in ED. xrays noted non displaced 3rd toe fracture. Aching pain. Presenting with richelle splint.  Complains of pain as aching.     4/1/22 prsesenting with L 3rd toe pain with non displaced 3rd toe fracture. Has been using richelle splint. Aching pain at distal tip of 3rd toe. Denies any injury since last visit. Initial injury was on 3/6/22. Presenting in DWS.     Review of Systems   Constitutional: Negative for chills, decreased appetite, fever and malaise/fatigue.   HENT: Negative for congestion, ear discharge and sore throat.    Eyes: Negative for discharge and pain.   Cardiovascular: Negative for chest pain, claudication and leg swelling.   Respiratory: Negative for cough and shortness of breath.    Skin: Negative for color change, nail changes and rash.   Musculoskeletal: Negative for arthritis, joint pain, joint swelling and muscle weakness.        R foot pain     Gastrointestinal: Negative for bloating, abdominal pain, diarrhea, nausea and vomiting.   Genitourinary: Negative for flank pain and hematuria.   Neurological: Negative for headaches, numbness and weakness.   Psychiatric/Behavioral: Negative for altered mental status.             Past Medical History:   Diagnosis Date    Anemia     Arthritis     Colon polyps 04/2012    Elevated CPK     Encounter for blood transfusion     Hypertension     Kidney stones     PUD (peptic ulcer disease)        Past Surgical History:   Procedure Laterality Date    COLONOSCOPY N/A 12/13/2017    Procedure: COLONOSCOPY;  Surgeon: Laxmi Crow MD;  Location:  [facility] END[facility];  Service: Endoscopy;  Laterality: N/A;    COLONOSCOPY W/ POLYPECTOMY      COLONOSCOPY W/ POLYPECTOMY      CYSTOSCOPY W/ STONE MANIPULATION Right 03/02/2017    DILATION AND CURETTAGE OF UTERUS      ESOPHAGOGASTRODUODENOSCOPY      ESOPHAGOGASTRODUODENOSCOPY      TUBAL LIGATION         Family History   Problem Relation Age of Onset    Hypertension Mother     Heart disease Mother     Hypertension Father     Stroke Father        Social History     Socioeconomic History    Marital status:    Tobacco Use    Smoking status: Never Smoker    Smokeless tobacco: Never Used   Substance and Sexual Activity    Alcohol use: No    Drug use: No    Sexual activity: Yes     Partners: Male     Social Determinants of Health     Financial Resource Strain: Low Risk     Difficulty of Paying Living Expenses: Not very hard   Food Insecurity: No Food Insecurity    Worried About Running Out of Food in the Last Year: Never true    Ran Out of Food in the Last Year: Never true   Transportation Needs: No Transportation Needs    Lack of Transportation (Medical): No    Lack of Transportation (Non-Medical): No   Physical Activity: Inactive    Days of Exercise per Week: 0 days    Minutes of Exercise per Session: 0 min   Stress: No Stress Concern Present    Feeling of Stress : Only a little   Social Connections: Unknown    Frequency of Communication with Friends and Family: More than three times a week    Frequency of Social Gatherings with Friends and Family: More than three times a week    Attends Adventism Services: Patient refused    Active Member of Clubs or Organizations: No    Attends Club or Organization Meetings: Never    Marital Status:    Housing Stability: Unknown    Unable to Pay for Housing in the Last Year: No    Unstable Housing in the Last Year: No       Current Outpatient Medications   Medication Sig Dispense Refill    atorvastatin (LIPITOR) 20 MG tablet TK 1 T PO  QHS       "azithromycin (Z-ALEXUS) 250 MG tablet Take 250 mg by mouth.      gabapentin (NEURONTIN) 300 MG capsule Take 300 mg by mouth nightly.      hydroCHLOROthiazide (MICROZIDE) 12.5 mg capsule Take 12.5 mg by mouth once daily.      ibandronate (BONIVA) 150 mg tablet Take 150 mg by mouth every 30 days.      ibuprofen (ADVIL,MOTRIN) 800 MG tablet Take 1 tablet (800 mg total) by mouth every 6 (six) hours as needed for Pain. 20 tablet 0    montelukast (SINGULAIR) 10 mg tablet Take 10 mg by mouth every evening.      omeprazole (PRILOSEC) 40 MG capsule TAKE ONE CAPSULE BY MOUTH ONCE DAILY 30 capsule 0    paroxetine (PAXIL) 40 MG tablet Take 40 mg by mouth once daily.      HYDROcodone-acetaminophen (NORCO) 7.5-325 mg per tablet Take 1 tablet by mouth every 6 (six) hours as needed for Pain. (Patient not taking: Reported on 4/1/2022) 12 tablet 0     No current facility-administered medications for this visit.       Review of patient's allergies indicates:  No Known Allergies    Vitals:    04/01/22 1404   Weight: 101.3 kg (223 lb 4.8 oz)   Height: 5' 8" (1.727 m)   PainSc:   7   PainLoc: Toe       Objective:      Physical Exam  Constitutional:       General: She is not in acute distress.     Appearance: She is well-developed.   HENT:      Nose: Nose normal.   Eyes:      Conjunctiva/sclera: Conjunctivae normal.   Pulmonary:      Effort: Pulmonary effort is normal.   Chest:      Chest wall: No tenderness.   Abdominal:      Tenderness: There is no abdominal tenderness.   Musculoskeletal:      Cervical back: Normal range of motion.   Neurological:      Mental Status: She is alert and oriented to person, place, and time.   Psychiatric:         Behavior: Behavior normal.       Vascular: Distal DP/PT pulses palpable 2/4. CRT < 3 sec to tips of toes. No vericosities noted to LEs. Hair growth present LE, warm to touch LE.    Dermatologic: No open lesions, lacerations or wounds. Interdigital spaces clean, dry and intact. No erythema, " lydia phillips noted LE    Musculoskeletal: MMT 5/5 in DF/PF/Inv/Ev resistance with no reproduction of pain in any direction. Passive range of motion of ankle and pedal joints is painless. No calf tenderness LE, Compartments soft/compressible.  R foot: ttp 3rd toe over distal phalanx.     Neurological: Light touch, proprioception, and sharp/dull sensation are all intact. Protective threshold with the [place]-Wienstein monofilament is intact. Vibratory sensation intact.         Assessment:       Encounter Diagnosis   Name Primary?    Closed nondisplaced fracture of distal phalanx of lesser toe of right foot with routine healing, subsequent encounter Yes         Plan:       Raquel was seen today for toe pain.    Diagnoses and all orders for this visit:    Closed nondisplaced fracture of distal phalanx of lesser toe of right foot with routine healing, subsequent encounter      I counseled the patient on her conditions, their implications and medical management.    57 y.o. female with R 3rd toe fx.     -recent R foot xrays reviewed. Non displaced fracture at distal tip of 3rd toe. Injury was less than a month ago. Continue to monitor. WBAT in DWS. Discussed possible surgical options as needed if pain continues.     -I reviewed imaging, clinical history, and diagnosis as above with the patient. I attempted to use layman's terms to educate the patient as well as utilize foot models and/or pictures. I personally went through imaging with the patient.    -The nature of the condition, options for management, as well as potential risks and complications were discussed in detail with patient. Patient was amenable to my recommendations and left my office fully informed and will follow up as instructed or sooner if necessary.    -f/u 1 month.    Note dictated with voice recognition software, please excuse any grammatical errors.

## 2022-04-07 ENCOUNTER — PATIENT OUTREACH (OUTPATIENT)
Dept: EMERGENCY MEDICINE | Facility: HOSPITAL | Age: 57
End: 2022-04-07
Payer: MEDICAID

## 2022-04-07 NOTE — PROGRESS NOTES
Spoke with patient and she stated she is doing much better not in as much pain. Patient stated she purchased a post surgical shoe to help relief some of the pressure when she walks and it seems to be helping. Patient stated she has a follow up appointment in a month for a follow up. Patient denied needing any other assistance at this time.

## 2022-04-28 ENCOUNTER — PATIENT OUTREACH (OUTPATIENT)
Dept: EMERGENCY MEDICINE | Facility: HOSPITAL | Age: 57
End: 2022-04-28
Payer: MEDICAID

## 2022-04-28 NOTE — PROGRESS NOTES
Spoke with patient and she stated she was doing much better, pain every now and again but overall doing fine. Patient was asked if she had been to see her PCP she stated she has an appointment next month. Patient denied needing any other assistance at this time.

## 2022-05-03 ENCOUNTER — TELEPHONE (OUTPATIENT)
Dept: PODIATRY | Facility: CLINIC | Age: 57
End: 2022-05-03
Payer: MEDICAID

## 2022-05-03 NOTE — TELEPHONE ENCOUNTER
----- Message from Igor Marc sent at 5/3/2022  1:28 PM CDT -----  Type:  Patient Requesting Call Back    Who Called:Raquel Finch  Would the patient rather a call back or a response via [employer]? Call back  Best Call Back Number:[phone]  Additional Information Patient Requesting Call Back regarding moving up appointment on 05/27 to 05/03

## 2022-05-05 ENCOUNTER — OFFICE VISIT (OUTPATIENT)
Dept: PODIATRY | Facility: CLINIC | Age: 57
End: 2022-05-05
Payer: MEDICAID

## 2022-05-05 VITALS
HEIGHT: 68 IN | BODY MASS INDEX: 33.9 KG/M2 | WEIGHT: 223.69 LBS | SYSTOLIC BLOOD PRESSURE: 130 MMHG | HEART RATE: 67 BPM | DIASTOLIC BLOOD PRESSURE: 74 MMHG | OXYGEN SATURATION: 98 %

## 2022-05-05 DIAGNOSIS — S92.534D CLOSED NONDISPLACED FRACTURE OF DISTAL PHALANX OF LESSER TOE OF RIGHT FOOT WITH ROUTINE HEALING, SUBSEQUENT ENCOUNTER: Primary | ICD-10-CM

## 2022-05-05 PROCEDURE — 3008F PR BODY MASS INDEX (BMI) DOCUMENTED: ICD-10-PCS | Mod: CPTII,,, | Performed by: PODIATRIST

## 2022-05-05 PROCEDURE — 99999 PR PBB SHADOW E&M-EST. PATIENT-LVL III: ICD-10-PCS | Mod: PBBFAC,,, | Performed by: PODIATRIST

## 2022-05-05 PROCEDURE — 1159F PR MEDICATION LIST DOCUMENTED IN MEDICAL RECORD: ICD-10-PCS | Mod: CPTII,,, | Performed by: PODIATRIST

## 2022-05-05 PROCEDURE — 1159F MED LIST DOCD IN RCRD: CPT | Mod: CPTII,,, | Performed by: PODIATRIST

## 2022-05-05 PROCEDURE — 3008F BODY MASS INDEX DOCD: CPT | Mod: CPTII,,, | Performed by: PODIATRIST

## 2022-05-05 PROCEDURE — 99213 PR OFFICE/OUTPT VISIT, EST, LEVL III, 20-29 MIN: ICD-10-PCS | Mod: S$PBB,,, | Performed by: PODIATRIST

## 2022-05-05 PROCEDURE — 1160F PR REVIEW ALL MEDS BY PRESCRIBER/CLIN PHARMACIST DOCUMENTED: ICD-10-PCS | Mod: CPTII,,, | Performed by: PODIATRIST

## 2022-05-05 PROCEDURE — 99999 PR PBB SHADOW E&M-EST. PATIENT-LVL III: CPT | Mod: PBBFAC,,, | Performed by: PODIATRIST

## 2022-05-05 PROCEDURE — 3078F DIAST BP <80 MM HG: CPT | Mod: CPTII,,, | Performed by: PODIATRIST

## 2022-05-05 PROCEDURE — 1160F RVW MEDS BY RX/DR IN RCRD: CPT | Mod: CPTII,,, | Performed by: PODIATRIST

## 2022-05-05 PROCEDURE — 99213 OFFICE O/P EST LOW 20 MIN: CPT | Mod: S$PBB,,, | Performed by: PODIATRIST

## 2022-05-05 PROCEDURE — 3075F SYST BP GE 130 - 139MM HG: CPT | Mod: CPTII,,, | Performed by: PODIATRIST

## 2022-05-05 PROCEDURE — 3078F PR MOST RECENT DIASTOLIC BLOOD PRESSURE < 80 MM HG: ICD-10-PCS | Mod: CPTII,,, | Performed by: PODIATRIST

## 2022-05-05 PROCEDURE — 3075F PR MOST RECENT SYSTOLIC BLOOD PRESS GE 130-139MM HG: ICD-10-PCS | Mod: CPTII,,, | Performed by: PODIATRIST

## 2022-05-05 PROCEDURE — 99213 OFFICE O/P EST LOW 20 MIN: CPT | Mod: PBBFAC,PN | Performed by: PODIATRIST

## 2022-05-05 NOTE — PROGRESS NOTES
Subjective:      Patient ID: Raquel Finch is a 57 y.o. female.    Chief Complaint: Follow-up (Follow up for 3rd toe right foot. Patients states her pain is off and on.)      57 y.o. female presenting with R 3rd toe pain. In surgical shoe today. Was in [place] last weekend with her . Not aware of any injury to her R foot. Noticed pain at 3rd toe during the trip. Pt thought because of cold weather. Pain did not improve even after she gets in town. Was evaluated in ED. xrays noted non displaced 3rd toe fracture. Aching pain. Presenting with richelle splint.  Complains of pain as aching.     4/1/22 prsesenting with L 3rd toe pain with non displaced 3rd toe fracture. Has been using richelle splint. Aching pain at distal tip of 3rd toe. Denies any injury since last visit. Initial injury was on 3/6/22. Presenting in DWS.     5/5/22 L 3rd toe fracture. In open toe shoes today. Still having some numbness at distal tip of 3rd toe. Toe pain improving with time.     Review of Systems   Constitutional: Negative for chills, decreased appetite, fever and malaise/fatigue.   HENT: Negative for congestion, ear discharge and sore throat.    Eyes: Negative for discharge and pain.   Cardiovascular: Negative for chest pain, claudication and leg swelling.   Respiratory: Negative for cough and shortness of breath.    Skin: Negative for color change, nail changes and rash.   Musculoskeletal: Negative for arthritis, joint pain, joint swelling and muscle weakness.        R foot pain     Gastrointestinal: Negative for bloating, abdominal pain, diarrhea, nausea and vomiting.   Genitourinary: Negative for flank pain and hematuria.   Neurological: Negative for headaches, numbness and weakness.   Psychiatric/Behavioral: Negative for altered mental status.             Past Medical History:   Diagnosis Date    Anemia     Arthritis     Colon polyps 04/2012    Elevated CPK     Encounter for blood transfusion     Hypertension     Kidney  stones     PUD (peptic ulcer disease)        Past Surgical History:   Procedure Laterality Date    COLONOSCOPY N/A 12/13/2017    Procedure: COLONOSCOPY;  Surgeon: Laxmi Crow MD;  [facility];  Service: Endoscopy;  Laterality: N/A;    COLONOSCOPY W/ POLYPECTOMY      COLONOSCOPY W/ POLYPECTOMY      CYSTOSCOPY W/ STONE MANIPULATION Right 03/02/2017    DILATION AND CURETTAGE OF UTERUS      ESOPHAGOGASTRODUODENOSCOPY      ESOPHAGOGASTRODUODENOSCOPY      TUBAL LIGATION         Family History   Problem Relation Age of Onset    Hypertension Mother     Heart disease Mother     Hypertension Father     Stroke Father        Social History     Socioeconomic History    Marital status:    Tobacco Use    Smoking status: Never Smoker    Smokeless tobacco: Never Used   Substance and Sexual Activity    Alcohol use: No    Drug use: No    Sexual activity: Yes     Partners: Male     Social Determinants of Health     Financial Resource Strain: Low Risk     Difficulty of Paying Living Expenses: Not very hard   Food Insecurity: No Food Insecurity    Worried About Running Out of Food in the Last Year: Never true    Ran Out of Food in the Last Year: Never true   Transportation Needs: No Transportation Needs    Lack of Transportation (Medical): No    Lack of Transportation (Non-Medical): No   Physical Activity: Inactive    Days of Exercise per Week: 0 days    Minutes of Exercise per Session: 0 min   Stress: No Stress Concern Present    Feeling of Stress : Only a little   Social Connections: Unknown    Frequency of Communication with Friends and Family: More than three times a week    Frequency of Social Gatherings with Friends and Family: More than three times a week    Attends Denominational Services: Patient refused    Active Member of Clubs or Organizations: No    Attends Club or Organization Meetings: Never    Marital Status:    Housing Stability: Unknown    Unable to Pay for Housing  "in the Last Year: No    Unstable Housing in the Last Year: No       Current Outpatient Medications   Medication Sig Dispense Refill    atorvastatin (LIPITOR) 20 MG tablet TK 1 T PO  QHS      azithromycin (Z-ALEXUS) 250 MG tablet Take 250 mg by mouth.      gabapentin (NEURONTIN) 300 MG capsule Take 300 mg by mouth nightly.      hydroCHLOROthiazide (MICROZIDE) 12.5 mg capsule Take 12.5 mg by mouth once daily.      HYDROcodone-acetaminophen (NORCO) 7.5-325 mg per tablet Take 1 tablet by mouth every 6 (six) hours as needed for Pain. 12 tablet 0    ibandronate (BONIVA) 150 mg tablet Take 150 mg by mouth every 30 days.      ibuprofen (ADVIL,MOTRIN) 800 MG tablet Take 1 tablet (800 mg total) by mouth every 6 (six) hours as needed for Pain. 20 tablet 0    montelukast (SINGULAIR) 10 mg tablet Take 10 mg by mouth every evening.      omeprazole (PRILOSEC) 40 MG capsule TAKE ONE CAPSULE BY MOUTH ONCE DAILY 30 capsule 0    paroxetine (PAXIL) 40 MG tablet Take 40 mg by mouth once daily.       No current facility-administered medications for this visit.       Review of patient's allergies indicates:  No Known Allergies    Vitals:    05/05/22 0917   BP: 130/74   Pulse: 67   SpO2: 98%   Weight: 101.5 kg (223 lb 11.2 oz)   Height: 5' 8" (1.727 m)   PainSc: 0-No pain       Objective:      Physical Exam  Constitutional:       General: She is not in acute distress.     Appearance: She is well-developed.   HENT:      Nose: Nose normal.   Eyes:      Conjunctiva/sclera: Conjunctivae normal.   Pulmonary:      Effort: Pulmonary effort is normal.   Chest:      Chest wall: No tenderness.   Abdominal:      Tenderness: There is no abdominal tenderness.   Musculoskeletal:      Cervical back: Normal range of motion.   Neurological:      Mental Status: She is alert and oriented to person, place, and time.   Psychiatric:         Behavior: Behavior normal.       Vascular: Distal DP/PT pulses palpable 2/4. CRT < 3 sec to tips of toes. No " vericosities noted to LEs. Hair growth present LE, warm to touch LE.    Dermatologic: No open lesions, lacerations or wounds. Interdigital spaces clean, dry and intact. No erythema, rubor, calor noted LE    Musculoskeletal: MMT 5/5 in DF/PF/Inv/Ev resistance with no reproduction of pain in any direction. Passive range of motion of ankle and pedal joints is painless. No calf tenderness LE, Compartments soft/compressible.  R foot: mild tenderness to 3rd toe over distal phalanx.     Neurological: Light touch, proprioception, and sharp/dull sensation are all intact. Protective threshold with the [place]-Wienstein monofilament is intact. Vibratory sensation intact.         Assessment:       Encounter Diagnosis   Name Primary?    Closed nondisplaced fracture of distal phalanx of lesser toe of right foot with routine healing, subsequent encounter Yes         Plan:       Raquel was seen today for follow-up.    Diagnoses and all orders for this visit:    Closed nondisplaced fracture of distal phalanx of lesser toe of right foot with routine healing, subsequent encounter      I counseled the patient on her conditions, their implications and medical management.    57 y.o. female with R 3rd toe fx.     -clinically improving. Still having mild pain at distal tip of 3rd toe. Continue to monitor. Possible surgical excision of fragment if pain continues. Verbalized understanding.    -I reviewed imaging, clinical history, and diagnosis as above with the patient. I attempted to use layman's terms to educate the patient as well as utilize foot models and/or pictures. I personally went through imaging with the patient.    -The nature of the condition, options for management, as well as potential risks and complications were discussed in detail with patient. Patient was amenable to my recommendations and left my office fully informed and will follow up as instructed or sooner if necessary.    -It was discussed the importance of wearing  shoes with adequate room in toe box to accommodate toe deformities. Recommended New Balance/[employer] shoe brands with adequate arch supports to alleviate abnormal pressure and improve stability of foot while walking. Avoid flat shoes and barefoot walking as these will exacerbate or worsen symptoms.   -f/u prn    Note dictated with voice recognition software, please excuse any grammatical errors.

## 2022-07-20 ENCOUNTER — OFFICE VISIT (OUTPATIENT)
Dept: PODIATRY | Facility: CLINIC | Age: 57
End: 2022-07-20
Payer: MEDICAID

## 2022-07-20 VITALS — BODY MASS INDEX: 34.27 KG/M2 | WEIGHT: 226.13 LBS | HEIGHT: 68 IN

## 2022-07-20 DIAGNOSIS — L60.0 INGROWN NAIL: Primary | ICD-10-CM

## 2022-07-20 PROCEDURE — 99999 PR PBB SHADOW E&M-EST. PATIENT-LVL III: CPT | Mod: PBBFAC,,, | Performed by: PODIATRIST

## 2022-07-20 PROCEDURE — 99213 OFFICE O/P EST LOW 20 MIN: CPT | Mod: 25,S$PBB,, | Performed by: PODIATRIST

## 2022-07-20 PROCEDURE — 99213 PR OFFICE/OUTPT VISIT, EST, LEVL III, 20-29 MIN: ICD-10-PCS | Mod: 25,S$PBB,, | Performed by: PODIATRIST

## 2022-07-20 PROCEDURE — 11750 NAIL REMOVAL: ICD-10-PCS | Mod: 51,TA,S$PBB, | Performed by: PODIATRIST

## 2022-07-20 PROCEDURE — 99999 PR PBB SHADOW E&M-EST. PATIENT-LVL III: ICD-10-PCS | Mod: PBBFAC,,, | Performed by: PODIATRIST

## 2022-07-20 PROCEDURE — 1160F RVW MEDS BY RX/DR IN RCRD: CPT | Mod: CPTII,,, | Performed by: PODIATRIST

## 2022-07-20 PROCEDURE — 1159F PR MEDICATION LIST DOCUMENTED IN MEDICAL RECORD: ICD-10-PCS | Mod: CPTII,,, | Performed by: PODIATRIST

## 2022-07-20 PROCEDURE — 11750 EXCISION NAIL&NAIL MATRIX: CPT | Mod: PBBFAC,PN | Performed by: PODIATRIST

## 2022-07-20 PROCEDURE — 1160F PR REVIEW ALL MEDS BY PRESCRIBER/CLIN PHARMACIST DOCUMENTED: ICD-10-PCS | Mod: CPTII,,, | Performed by: PODIATRIST

## 2022-07-20 PROCEDURE — 3008F BODY MASS INDEX DOCD: CPT | Mod: CPTII,,, | Performed by: PODIATRIST

## 2022-07-20 PROCEDURE — 1159F MED LIST DOCD IN RCRD: CPT | Mod: CPTII,,, | Performed by: PODIATRIST

## 2022-07-20 PROCEDURE — 3008F PR BODY MASS INDEX (BMI) DOCUMENTED: ICD-10-PCS | Mod: CPTII,,, | Performed by: PODIATRIST

## 2022-07-20 PROCEDURE — 99213 OFFICE O/P EST LOW 20 MIN: CPT | Mod: 51,TA,PBBFAC,PN,25 | Performed by: PODIATRIST

## 2022-07-20 NOTE — PROCEDURES
Nail Removal    Date/Time: 7/20/2022 3:15 PM  Performed by: Cori Woody DPM  Authorized by: Cori Woody DPM     Consent Done?:  Yes (Written)  Time out: Immediately prior to the procedure a time out was called    Prep: patient was prepped and draped in usual sterile fashion    Location:     Location:  Left foot    Location detail:  Left big toe  Anesthesia:     Anesthesia:  Local infiltration    Local anesthetic:  Bupivacaine 0.5% without epinephrine    Anesthetic total (ml):  5  Procedure Details:     Preparation:  Skin prepped with alcohol and skin prepped with Betadine    Amount removed:  Partial    Side:  Bilateral    Wedge excision of skin of nail fold: No      Nail bed sutured?: No      Nail matrix removed:  Partial    Removal method:  Phenol and alcohol    Removed nail replaced and anchored: No      Dressing applied:  4x4, gauze roll and antibiotic ointment    Patient tolerance:  Patient tolerated the procedure well with no immediate complications

## 2022-07-20 NOTE — PROCEDURES
Nail Removal    Date/Time: 7/20/2022 3:15 PM  Performed by: Cori Woody DPM  Authorized by: Cori Woody DPM     Consent Done?:  Yes (Written)  Time out: Immediately prior to the procedure a time out was called    Prep: patient was prepped and draped in usual sterile fashion    Location:     Location:  Right foot    Location detail:  Right big toe  Anesthesia:     Anesthesia:  Local infiltration    Local anesthetic:  Bupivacaine 0.5% without epinephrine    Anesthetic total (ml):  5  Procedure Details:     Preparation:  Skin prepped with alcohol and skin prepped with Betadine    Amount removed:  Partial    Side:  Bilateral    Wedge excision of skin of nail fold: No      Nail bed sutured?: No      Nail matrix removed:  Partial    Removal method:  Phenol and alcohol    Removed nail replaced and anchored: No      Dressing applied:  4x4, antibiotic ointment and gauze roll    Patient tolerance:  Patient tolerated the procedure well with no immediate complications

## 2022-07-20 NOTE — PROGRESS NOTES
Subjective:      Patient ID: Raquel Finch is a 57 y.o. female.    Chief Complaint: Ingrown Toenail (Patient presents today for bilateral ingrown toenail medial and lateral side of the hallux. )      57 y.o. female presenting with R 3rd toe pain. In surgical shoe today. Was in [place] last weekend with her . Not aware of any injury to her R foot. Noticed pain at 3rd toe during the trip. Pt thought because of cold weather. Pain did not improve even after she gets in town. Was evaluated in ED. xrays noted non displaced 3rd toe fracture. Aching pain. Presenting with richelle splint.  Complains of pain as aching.     4/1/22 prsesenting with L 3rd toe pain with non displaced 3rd toe fracture. Has been using richelle splint. Aching pain at distal tip of 3rd toe. Denies any injury since last visit. Initial injury was on 3/6/22. Presenting in [facility].     5/5/22 L 3rd toe fracture. In open toe shoes today. Still having some numbness at distal tip of 3rd toe. Toe pain improving with time.     7/20/22 presenting for b/l hallux pain. Points both borders of bilateral hallux.  Describes pain as throbbing and aching when she wears closed shoes.  Patient has been having ingrown toenail problem on and off and like to stick for more of a permanent treatment.    Review of Systems   Constitutional: Negative for chills, decreased appetite, fever and malaise/fatigue.   HENT: Negative for congestion, ear discharge and sore throat.    Eyes: Negative for discharge and pain.   Cardiovascular: Negative for chest pain, claudication and leg swelling.   Respiratory: Negative for cough and shortness of breath.    Skin: Negative for color change, nail changes and rash.   Musculoskeletal: Negative for arthritis, joint pain, joint swelling and muscle weakness.        Bilateral hallux pain     Gastrointestinal: Negative for bloating, abdominal pain, diarrhea, nausea and vomiting.   Genitourinary: Negative for flank pain and hematuria.    Neurological: Negative for headaches, numbness and weakness.   Psychiatric/Behavioral: Negative for altered mental status.             Past Medical History:   Diagnosis Date    Anemia     Arthritis     Colon polyps 04/2012    Elevated CPK     Encounter for blood transfusion     Hypertension     Kidney stones     PUD (peptic ulcer disease)        Past Surgical History:   Procedure Laterality Date    COLONOSCOPY N/A 12/13/2017    Procedure: COLONOSCOPY;  Surgeon: Laxmi Crow MD;  Location: [facility];  Service: Endoscopy;  Laterality: N/A;    COLONOSCOPY W/ POLYPECTOMY      COLONOSCOPY W/ POLYPECTOMY      CYSTOSCOPY W/ STONE MANIPULATION Right 03/02/2017    DILATION AND CURETTAGE OF UTERUS      ESOPHAGOGASTRODUODENOSCOPY      ESOPHAGOGASTRODUODENOSCOPY      TUBAL LIGATION         Family History   Problem Relation Age of Onset    Hypertension Mother     Heart disease Mother     Hypertension Father     Stroke Father        Social History     Socioeconomic History    Marital status:    Tobacco Use    Smoking status: Never Smoker    Smokeless tobacco: Never Used   Substance and Sexual Activity    Alcohol use: No    Drug use: No    Sexual activity: Yes     Partners: Male     Social Determinants of Health     Financial Resource Strain: Low Risk     Difficulty of Paying Living Expenses: Not very hard   Food Insecurity: No Food Insecurity    Worried About Running Out of Food in the Last Year: Never true    Ran Out of Food in the Last Year: Never true   Transportation Needs: No Transportation Needs    Lack of Transportation (Medical): No    Lack of Transportation (Non-Medical): No   Physical Activity: Inactive    Days of Exercise per Week: 0 days    Minutes of Exercise per Session: 0 min   Stress: No Stress Concern Present    Feeling of Stress : Only a little   Social Connections: Unknown    Frequency of Communication with Friends and Family: More than three times a week     "Frequency of Social Gatherings with Friends and Family: More than three times a week    Attends Yazidism Services: Patient refused    Active Member of Clubs or Organizations: No    Attends Club or Organization Meetings: Never    Marital Status:    Housing Stability: Unknown    Unable to Pay for Housing in the Last Year: No    Unstable Housing in the Last Year: No       Current Outpatient Medications   Medication Sig Dispense Refill    atorvastatin (LIPITOR) 20 MG tablet TK 1 T PO  QHS      azithromycin (Z-ALEXUS) 250 MG tablet Take 250 mg by mouth.      gabapentin (NEURONTIN) 300 MG capsule Take 300 mg by mouth nightly.      hydroCHLOROthiazide (MICROZIDE) 12.5 mg capsule Take 12.5 mg by mouth once daily.      HYDROcodone-acetaminophen (NORCO) 7.5-325 mg per tablet Take 1 tablet by mouth every 6 (six) hours as needed for Pain. 12 tablet 0    ibandronate (BONIVA) 150 mg tablet Take 150 mg by mouth every 30 days.      ibuprofen (ADVIL,MOTRIN) 800 MG tablet Take 1 tablet (800 mg total) by mouth every 6 (six) hours as needed for Pain. 20 tablet 0    montelukast (SINGULAIR) 10 mg tablet Take 10 mg by mouth every evening.      omeprazole (PRILOSEC) 40 MG capsule TAKE ONE CAPSULE BY MOUTH ONCE DAILY 30 capsule 0    paroxetine (PAXIL) 40 MG tablet Take 40 mg by mouth once daily.       No current facility-administered medications for this visit.       Review of patient's allergies indicates:  No Known Allergies    Vitals:    07/20/22 1506   Weight: 102.6 kg (226 lb 1.6 oz)   Height: 5' 8" (1.727 m)   PainSc:   4       Objective:      Physical Exam  Constitutional:       General: She is not in acute distress.     Appearance: She is well-developed.   HENT:      Nose: Nose normal.   Eyes:      Conjunctiva/sclera: Conjunctivae normal.   Pulmonary:      Effort: Pulmonary effort is normal.   Chest:      Chest wall: No tenderness.   Abdominal:      Tenderness: There is no abdominal tenderness. "   Musculoskeletal:      Cervical back: Normal range of motion.   Neurological:      Mental Status: She is alert and oriented to person, place, and time.   Psychiatric:         Behavior: Behavior normal.       Vascular: Distal DP/PT pulses palpable 2/4. CRT < 3 sec to tips of toes. No vericosities noted to LEs. Hair growth present LE, warm to touch LE.    Dermatologic: No open lesions, lacerations or wounds. Interdigital spaces clean, dry and intact. No erythema, rubor, calor noted LE.  Bilateral hallux:  Incurvated nail plate along the medial border of hallux bilaterally with mild swelling.     Musculoskeletal: MMT 5/5 in DF/PF/Inv/Ev resistance with no reproduction of pain in any direction. Passive range of motion of ankle and pedal joints is painless. No calf tenderness LE, Compartments soft/compressible.    Neurological: Light touch, proprioception, and sharp/dull sensation are all intact. Protective threshold with the [place]-Wienstein monofilament is intact. Vibratory sensation intact.         Assessment:       Encounter Diagnosis   Name Primary?    Ingrown nail Yes         Plan:       Raquel was seen today for ingrown toenail.    Diagnoses and all orders for this visit:    Ingrown nail      I counseled the patient on her conditions, their implications and medical management.    57 y.o. female with bilateral hallux ingrown toenail.     -PNA with phenol.  Patient tolerated well.  See procedure note.  -Recommend to follow soaking instruction instruction was given to patient.  Advised the patient to continue for a week. If  patient experiencing pain, swelling, drainage, any signs of  infection,  I advised to call and come in to  the office for evaluation.  Patient verbalized understanding.  Recommend weight-bearing as tolerated in surgical shoe versus open toe shoe for a week.     -The nature of the condition, options for management, as well as potential risks and complications were discussed in detail with  patient. Patient was amenable to my recommendations and left my office fully informed and will follow up as instructed or sooner if necessary.    -Patient was advised of signs and symptoms of infection including redness, drainage, purulence, odor, streaking, fever, chills and I advised patient to seek medical attention (ER or urgent care) if these symptoms arise.   -f/u prn    Note dictated with voice recognition software, please excuse any grammatical errors.

## 2022-08-15 ENCOUNTER — OFFICE VISIT (OUTPATIENT)
Dept: ORTHOPEDICS | Facility: CLINIC | Age: 57
End: 2022-08-15
Payer: MEDICAID

## 2022-08-15 VITALS
DIASTOLIC BLOOD PRESSURE: 80 MMHG | HEIGHT: 68 IN | SYSTOLIC BLOOD PRESSURE: 152 MMHG | BODY MASS INDEX: 34.32 KG/M2 | HEART RATE: 71 BPM | WEIGHT: 226.44 LBS

## 2022-08-15 DIAGNOSIS — M75.111 PARTIAL NONTRAUMATIC RUPTURE OF RIGHT ROTATOR CUFF: Primary | ICD-10-CM

## 2022-08-15 PROCEDURE — 99999 PR PBB SHADOW E&M-EST. PATIENT-LVL IV: CPT | Mod: PBBFAC,,, | Performed by: PHYSICIAN ASSISTANT

## 2022-08-15 PROCEDURE — 1160F PR REVIEW ALL MEDS BY PRESCRIBER/CLIN PHARMACIST DOCUMENTED: ICD-10-PCS | Mod: CPTII,,, | Performed by: PHYSICIAN ASSISTANT

## 2022-08-15 PROCEDURE — 99499 UNLISTED E&M SERVICE: CPT | Mod: S$PBB,,, | Performed by: PHYSICIAN ASSISTANT

## 2022-08-15 PROCEDURE — 20610 DRAIN/INJ JOINT/BURSA W/O US: CPT | Mod: S$PBB,RT,, | Performed by: PHYSICIAN ASSISTANT

## 2022-08-15 PROCEDURE — 3008F BODY MASS INDEX DOCD: CPT | Mod: CPTII,,, | Performed by: PHYSICIAN ASSISTANT

## 2022-08-15 PROCEDURE — 20610 LARGE JOINT ASPIRATION/INJECTION: R SUBACROMIAL BURSA: ICD-10-PCS | Mod: S$PBB,RT,, | Performed by: PHYSICIAN ASSISTANT

## 2022-08-15 PROCEDURE — 1159F MED LIST DOCD IN RCRD: CPT | Mod: CPTII,,, | Performed by: PHYSICIAN ASSISTANT

## 2022-08-15 PROCEDURE — 3079F DIAST BP 80-89 MM HG: CPT | Mod: CPTII,,, | Performed by: PHYSICIAN ASSISTANT

## 2022-08-15 PROCEDURE — 1159F PR MEDICATION LIST DOCUMENTED IN MEDICAL RECORD: ICD-10-PCS | Mod: CPTII,,, | Performed by: PHYSICIAN ASSISTANT

## 2022-08-15 PROCEDURE — 99499 NO LOS: ICD-10-PCS | Mod: S$PBB,,, | Performed by: PHYSICIAN ASSISTANT

## 2022-08-15 PROCEDURE — 3077F PR MOST RECENT SYSTOLIC BLOOD PRESSURE >= 140 MM HG: ICD-10-PCS | Mod: CPTII,,, | Performed by: PHYSICIAN ASSISTANT

## 2022-08-15 PROCEDURE — 1160F RVW MEDS BY RX/DR IN RCRD: CPT | Mod: CPTII,,, | Performed by: PHYSICIAN ASSISTANT

## 2022-08-15 PROCEDURE — 3079F PR MOST RECENT DIASTOLIC BLOOD PRESSURE 80-89 MM HG: ICD-10-PCS | Mod: CPTII,,, | Performed by: PHYSICIAN ASSISTANT

## 2022-08-15 PROCEDURE — 20610 DRAIN/INJ JOINT/BURSA W/O US: CPT | Mod: PBBFAC,PN,RT | Performed by: PHYSICIAN ASSISTANT

## 2022-08-15 PROCEDURE — 3077F SYST BP >= 140 MM HG: CPT | Mod: CPTII,,, | Performed by: PHYSICIAN ASSISTANT

## 2022-08-15 PROCEDURE — 99999 PR PBB SHADOW E&M-EST. PATIENT-LVL IV: ICD-10-PCS | Mod: PBBFAC,,, | Performed by: PHYSICIAN ASSISTANT

## 2022-08-15 PROCEDURE — 99214 OFFICE O/P EST MOD 30 MIN: CPT | Mod: PBBFAC,PN,25 | Performed by: PHYSICIAN ASSISTANT

## 2022-08-15 PROCEDURE — 3008F PR BODY MASS INDEX (BMI) DOCUMENTED: ICD-10-PCS | Mod: CPTII,,, | Performed by: PHYSICIAN ASSISTANT

## 2022-08-15 RX ORDER — PAROXETINE 10 MG/1
10 TABLET, FILM COATED ORAL DAILY
COMMUNITY
Start: 2022-05-20 | End: 2023-01-12 | Stop reason: CLARIF

## 2022-08-15 RX ORDER — NEBULIZER AND COMPRESSOR
EACH MISCELLANEOUS
COMMUNITY
Start: 2022-04-24

## 2022-08-15 RX ORDER — NAPROXEN 500 MG/1
500 TABLET ORAL 2 TIMES DAILY PRN
COMMUNITY
Start: 2022-07-20 | End: 2023-01-12 | Stop reason: CLARIF

## 2022-08-15 RX ORDER — ACETAMINOPHEN 325 MG/1
325 TABLET ORAL EVERY 6 HOURS PRN
COMMUNITY
End: 2023-01-12 | Stop reason: CLARIF

## 2022-08-15 RX ORDER — ALPRAZOLAM 0.5 MG/1
0.5 TABLET ORAL 2 TIMES DAILY PRN
COMMUNITY
Start: 2022-04-29

## 2022-08-15 RX ORDER — PREDNISONE 20 MG/1
20 TABLET ORAL DAILY
COMMUNITY
Start: 2022-08-05 | End: 2023-01-12 | Stop reason: CLARIF

## 2022-08-15 RX ORDER — CETIRIZINE HYDROCHLORIDE 10 MG/1
TABLET ORAL
COMMUNITY
Start: 2022-07-13

## 2022-08-15 RX ORDER — BUDESONIDE AND FORMOTEROL FUMARATE DIHYDRATE 160; 4.5 UG/1; UG/1
2 AEROSOL RESPIRATORY (INHALATION)
COMMUNITY
Start: 2022-04-29

## 2022-08-15 RX ORDER — TIZANIDINE 4 MG/1
TABLET ORAL EVERY 8 HOURS PRN
COMMUNITY
Start: 2022-07-21

## 2022-08-15 RX ORDER — PANTOPRAZOLE SODIUM 40 MG/1
TABLET, DELAYED RELEASE ORAL
COMMUNITY
Start: 2022-07-21 | End: 2023-01-12 | Stop reason: CLARIF

## 2022-08-15 RX ORDER — TRIAMCINOLONE ACETONIDE 40 MG/ML
40 INJECTION, SUSPENSION INTRA-ARTICULAR; INTRAMUSCULAR
Status: DISCONTINUED | OUTPATIENT
Start: 2022-08-15 | End: 2022-08-15 | Stop reason: HOSPADM

## 2022-08-15 RX ORDER — FLUTICASONE PROPIONATE 220 UG/1
2 AEROSOL, METERED RESPIRATORY (INHALATION)
COMMUNITY
Start: 2022-05-20

## 2022-08-15 RX ADMIN — TRIAMCINOLONE ACETONIDE 40 MG: 40 INJECTION, SUSPENSION INTRA-ARTICULAR; INTRAMUSCULAR at 01:08

## 2022-08-15 NOTE — PROCEDURES
Large Joint Aspiration/Injection: R subacromial bursa    Date/Time: 8/15/2022 1:00 PM  Performed by: Cyndy Goldman PA-C  Authorized by: Cyndy Goldman PA-C     Consent Done?:  Yes (Verbal)  Indications:  Pain  Site marked: the procedure site was marked    Timeout: prior to procedure the correct patient, procedure, and site was verified    Prep: patient was prepped and draped in usual sterile fashion      Local anesthesia used?: Yes    Local anesthetic:  Lidocaine 1% without epinephrine and topical anesthetic    Details:  Needle Size:  21 G  Ultrasonic Guidance for needle placement?: No    Approach:  Posterior  Location:  Shoulder  Site:  R subacromial bursa  Medications:  40 mg triamcinolone acetonide 40 mg/mL  Patient tolerance:  Patient tolerated the procedure well with no immediate complications

## 2022-08-15 NOTE — PROGRESS NOTES
Subjective:      Patient ID: Raquel Finch is a 57 y.o. female.    Chief Complaint: Pain of the Right Shoulder      57-year-old female followup partial rotator cuff tear of the right shoulder.  She  gets corticosteroid injections into the shoulder about twice a year.  Last injection helped for about 8 months.  She usually gets massages which helps also.      Review of Systems   Constitutional: Negative for chills and fever.   Cardiovascular: Negative for chest pain.   Respiratory: Negative for cough.    Hematologic/Lymphatic: Does not bruise/bleed easily.   Skin: Negative for poor wound healing and rash.   Musculoskeletal: Positive for joint pain, myalgias and stiffness.   Gastrointestinal: Negative for abdominal pain.   Genitourinary: Negative for bladder incontinence.   Neurological: Negative for dizziness, loss of balance and weakness.   Psychiatric/Behavioral: Negative for altered mental status.       Review of patient's allergies indicates:  No Known Allergies     Current Outpatient Medications   Medication Sig Dispense Refill    acetaminophen (TYLENOL) 325 MG tablet Take 325 mg by mouth every 6 (six) hours as needed for Pain.      ALPRAZolam (XANAX) 0.5 MG tablet Take 0.5 mg by mouth 2 (two) times daily as needed.      atorvastatin (LIPITOR) 20 MG tablet TK 1 T PO  QHS      azithromycin (Z-ALEXUS) 250 MG tablet Take 250 mg by mouth.      budesonide-formoterol 160-4.5 mcg (SYMBICORT) 160-4.5 mcg/actuation HFAA 2 puffs.      cetirizine (ZYRTEC) 10 MG tablet TAKE 1 TABLET BY MOUTH DAILY AS NEEDED FOR SINUS      fluticasone propionate (FLOVENT HFA) 220 mcg/actuation inhaler 2 puffs.      gabapentin (NEURONTIN) 300 MG capsule Take 300 mg by mouth nightly.      hydroCHLOROthiazide (MICROZIDE) 12.5 mg capsule Take 12.5 mg by mouth once daily.      ibuprofen (ADVIL,MOTRIN) 800 MG tablet Take 1 tablet (800 mg total) by mouth every 6 (six) hours as needed for Pain. 20 tablet 0    montelukast (SINGULAIR) 10 mg  "tablet Take 10 mg by mouth every evening.      naproxen (NAPROSYN) 500 MG tablet Take 500 mg by mouth 2 (two) times daily as needed.      nebulizer and compressor Domonique as per administration instructions      omeprazole (PRILOSEC) 40 MG capsule TAKE ONE CAPSULE BY MOUTH ONCE DAILY 30 capsule 0    pantoprazole (PROTONIX) 40 MG tablet       paroxetine (PAXIL) 10 MG tablet Take 10 mg by mouth once daily.      predniSONE (DELTASONE) 20 MG tablet Take 20 mg by mouth once daily.      tiZANidine (ZANAFLEX) 4 MG tablet       HYDROcodone-acetaminophen (NORCO) 7.5-325 mg per tablet Take 1 tablet by mouth every 6 (six) hours as needed for Pain. (Patient not taking: Reported on 8/15/2022) 12 tablet 0    ibandronate (BONIVA) 150 mg tablet Take 150 mg by mouth every 30 days.      paroxetine (PAXIL) 40 MG tablet Take 40 mg by mouth once daily.       No current facility-administered medications for this visit.        The patient's relevant past medical, surgical, and social history was reviewed in Epic.       Objective:      VITAL SIGNS: BP (!) 152/80   Pulse 71   Ht 5' 8" (1.727 m)   Wt 102.7 kg (226 lb 6.6 oz)   BMI 34.43 kg/m²     Ortho/SPM Exam         Assessment:       1. Partial nontraumatic rupture of right rotator cuff          Plan:         Raquel was seen today for pain.    Diagnoses and all orders for this visit:    Partial nontraumatic rupture of right rotator cuff  -     Large Joint Aspiration/Injection: R subacromial bursa    Repeat right subacromial corticosteroid injection given today.  Follow-up as needed.    Diagnoses and plan discussed with the patient, as well as the expected course and duration of his symptoms.  All questions and concerns were addressed prior to the end of the visit.   Instructed patient to call office if they have any future questions/concerns or to schedule apt. Patient will return to see me if symptoms worsen or fail to improve    Note dictated with voice recognition software, " please excuse any grammatical errors.        Cyndy Goldman PA-C   08/15/2022

## 2022-09-30 ENCOUNTER — PATIENT MESSAGE (OUTPATIENT)
Dept: OTOLARYNGOLOGY | Facility: CLINIC | Age: 57
End: 2022-09-30
Payer: MEDICAID

## 2022-09-30 ENCOUNTER — TELEPHONE (OUTPATIENT)
Dept: OTOLARYNGOLOGY | Facility: CLINIC | Age: 57
End: 2022-09-30
Payer: MEDICAID

## 2022-09-30 NOTE — TELEPHONE ENCOUNTER
Left vm to let know that mihai holland doesn't see pt with closed fracture of nasal bone, the appt on 10/3/22 needs to be reschedule with correct provider. Call back phone number provided and will send a message to staff to reschedule.

## 2022-12-22 ENCOUNTER — TELEPHONE (OUTPATIENT)
Dept: GASTROENTEROLOGY | Facility: CLINIC | Age: 57
End: 2022-12-22
Payer: MEDICAID

## 2022-12-22 NOTE — LETTER
December 22, 2022    Raquel Finch  [address] [place] [address]             [facility] - Gastroenterology  [address], [place]   [place] [address]  Phone: [phone]  Fax: [phone] Dear Mr. Finch:    We have attempted to contact you to schedule a colonoscopy that was ordered by your doctor. Please contact the office to schedule at [phone].       If you have any questions or concerns, please don't hesitate to call.    Sincerely,        Lima Rojas MD

## 2023-01-06 ENCOUNTER — TELEPHONE (OUTPATIENT)
Dept: GASTROENTEROLOGY | Facility: CLINIC | Age: 58
End: 2023-01-06
Payer: MEDICAID

## 2023-01-06 RX ORDER — SODIUM, POTASSIUM,MAG SULFATES 17.5-3.13G
1 SOLUTION, RECONSTITUTED, ORAL ORAL DAILY
Qty: 1 KIT | Refills: 0 | Status: SHIPPED | OUTPATIENT
Start: 2023-01-06 | End: 2023-01-08

## 2023-01-06 NOTE — TELEPHONE ENCOUNTER
Referring Physician: Dr. Carmen Sanders                             Date: 1/6/2023    Reason for Referral: Personal history of colon polyps      Family History of:   Colon polyp: No  Relationship/Age of Onset:       Colon cancer: No  Relationship/Age of Onset:       Patient with:   Hemoccults Done:       Iron deficient:   No      On Blood Thinner: No      Valvular heart disease/valve replacement: No      Anemia Present: No      On NSAID: No    On Adipex or phentermine: No     On Ozempic: No     Lung disease: No      Kidney disease: No     Hx of Crohn's or Ulcerative colitis: No     Hx of polyps:       Hx of colon cancer:       Previous colon evalations: Yes  When: 12/13/2017  Where: [facility]  Pertinent symptoms:           Review of patient's allergies indicates: NKDA        Patient was scheduled for colonoscopy on  1/24/2023      with Dr. Rojas at [facility].       instructions were reviewed with patient.         Prep sent to [facility] in [facility] Instructions    You are scheduled for a colonoscopy with Dr. Rojas on 1/24/2023 at [facility]. Enter through the [facility] Entrance and check in at Same Day Surgery.  To ensure that your test is accurate and complete, you MUST follow these instructions listed below.  If you have any questions, please call our office at [phone].  Plan on being at the hospital for your procedure for 3-4 hours.      IF YOU HAVE ANY QUESTIONS ABOUT YOUR ARRIVAL TIME YOU CAN CALL [phone].    1.  Follow a CLEAR LIQUID DIET for the entire day before your scheduled colonoscopy.  This means no solid food the entire day starting when you wake.  You may have as much of the clear liquids as you want throughout the day.   CLEAR LIQUID DIET:      - NO DAIRY   - You can have:  Coffee with sugar (no creamer), tea, water, soda, apple or white grape juice, chicken or beef broth/bouillon (no meat, noodles, or veggies),  popsicles, , lemonade.    2.  AT 5 pm the evening before your  colonoscopy, POUR ONE (1) BOTTLE OF SUPREP INTO THE MIXING CONTAINER, PROVIDED INSIDE THE BOX.  ADD WATER TO THE LINE ON THE CONTAINER AND MIX IT WELL.  DRINK THE ENTIRE CONTAINER AND THEN DRINK TWO (2) MORE CONTAINERS OF WATER OVER THE NEXT 1 HOUR.  This is sometimes easier to drink if this solution is cold, so you can mix the solution 20 minutes ahead of time and place in the refrigerator prior to drinking.  You have to drink the solution within 30-45 minutes of mixing it.  Do NOT put this solution over ice.  It IS ok to drink with a straw.    3.  The endoscopy department will call you 1 day before your colonoscopy to tell you the exact time to arrive, AND to tell you the exact time to drink the 2nd portion of your prep (which will be FIVE HOURS BEFORE YOUR ARRIVAL TIME).  At this time given to you, POUR ONE (1) BOTTLE OF SUPREP INTO THE MIXING CONTAINER, PROVIDED INSIDE THE BOX.  ADD WATER TO THE LINE ON THE CONTAINER AND MIX IT WELL.  DRINK THE ENTIRE CONTAINER AND THEN DRINK TWO (2) MORE CONTAINERS OF WATER OVER THE NEXT 1 HOUR.  This is sometimes easier to drink if this solution is cold, so you can mix the solution 20 minutes ahead of time and place in the refrigerator prior to drinking.  You have to drink the solution within 30-45 minutes of mixing it.  Do NOT put this solution over ice.  It IS ok to drink with a straw.  Once this is complete, you may not have ANYTHING else by mouth!    4.  You must have someone with you to DRIVE YOU HOME since you will be receiving IV sedation for the colonoscopy.    5.  It is ok to take MOST of your REGULAR MEDICATIONS  in the morning of your test with a SIP of water.  THE ONLY MEDS YOU NEED TO HOLD ARE YOUR DIABETES MEDICATIONS,  SOME BLOOD PRESSURE MEDS, AND BLOOD THINNERS IF OK'D BY YOUR DOCTOR.  Do NOT have anything else to eat or drink the morning of your colonoscopy.  It is ok to brush your teeth.    6.  If you are on blood thinners THAT YOU HAVE BEEN INSTRUCTED TO  HOLD BY YOUR DOCTOR FOR THIS PROCEDURE, then do NOT take this the morning of your colonoscopy.  Do NOT stop these medications on your own, they must be approved to be held by your doctor.  Your colonoscopy can NOT be done if you are on these medications.  Examples of blood thinners include: Coumadin, Aggrenox, Plavix, Pradaxa, Reapro, Pletal, Xarelto, Ticagrelor, Brilinta, Eliquis, and high dose aspirin (325 mg).  You do not have to stop baby aspirin 81 mg.    7.  IF YOU ARE DIABETIC:  NO INSULIN OR ORAL MEDICATIONS THE MORNING OF THE COLONOSCOPY.  TAKE ONLY HALF THE DOSE OF YOUR INSULIN THE DAY BEFORE THE COLONOSCOPY.  DO NOT TAKE ANY ORAL DIABETIC MEDICATIONS THE DAY BEFORE THE COLONOSCOPY.  IF YOU ARE AN INSULIN DEPENDENT DIABETIC WITH UNSTABLE BLOOD SUGARS, NOTIFY YOUR PRIMARY CARE PHYSICIAN FOR INSTRUCTIONS.

## 2023-01-06 NOTE — TELEPHONE ENCOUNTER
----- Message from Lillian Tong RN sent at 1/6/2023  8:57 AM CST -----  Regarding: Colonoscopy  Good morning,   This message was sent to the wrong place. This message was for [facility] staff.     Thank you,  Lillian Tong RN  PH Nurse Navigator  [facility]  [facility]    ----- Message -----  From: Dianelys Tucker  Sent: 1/5/2023  12:54 PM CST  To: Christos Amato Staff    Type:  Patient Returning Call    Who Called: Pt  Who Left Message for Patient: Dr. Sher Staff  Does the patient know what this is regarding?: Schedule a colonoscopy  Would the patient rather a call back or a response via [facility]?  call  Best Call Back Number:[phone]  Additional Information:

## 2023-01-17 ENCOUNTER — TELEPHONE (OUTPATIENT)
Dept: GASTROENTEROLOGY | Facility: CLINIC | Age: 58
End: 2023-01-17
Payer: MEDICAID

## 2023-01-17 NOTE — TELEPHONE ENCOUNTER
----- Message from Jillian Marcelo sent at 1/17/2023  1:26 PM CST -----  Type:  EMG    Who Called:pt   Would the patient rather a call back or a response via [facility]? Call   Best Call Back Number:[phone]  Additional Information: orders for an emg on 01/24

## 2023-01-17 NOTE — TELEPHONE ENCOUNTER
Patient called wanting to schedule a EGD along with the colonoscopy. Explained to patient that she would need to have a visit in the office to get the EGD scheduled. Patient decided not to do it.

## 2023-01-19 ENCOUNTER — TELEPHONE (OUTPATIENT)
Dept: PODIATRY | Facility: CLINIC | Age: 58
End: 2023-01-19
Payer: MEDICAID

## 2023-01-19 NOTE — TELEPHONE ENCOUNTER
----- Message from Dianelys Tucker sent at 1/19/2023 12:20 PM CST -----  Type:  Needs Medical Advice    Who Called:  Pt  Would the patient rather a call back or a response via [facility]? Call  Best Call Back Number:  [phone]  Additional Information: Pt believes to have a broken toe on her right foot third toe and would like to be seen sooner that the appt already scheduled on 2/2/23.  Pt has also been added to the waiting list.

## 2023-01-20 ENCOUNTER — TELEPHONE (OUTPATIENT)
Dept: PODIATRY | Facility: CLINIC | Age: 58
End: 2023-01-20
Payer: MEDICAID

## 2023-01-20 NOTE — TELEPHONE ENCOUNTER
----- Message from Maci Mckeon sent at 1/20/2023  7:45 AM CST -----  Type:  Needs Medical Advice    Who Called: self  Reason:returning call  Would the patient rather a call back or a response via [facility]? call  Best Call Back Number: [phone]  Additional Information: none
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
## 2023-01-23 ENCOUNTER — OFFICE VISIT (OUTPATIENT)
Dept: ORTHOPEDICS | Facility: CLINIC | Age: 58
End: 2023-01-23
Payer: MEDICAID

## 2023-01-23 ENCOUNTER — OFFICE VISIT (OUTPATIENT)
Dept: OTOLARYNGOLOGY | Facility: CLINIC | Age: 58
End: 2023-01-23
Payer: MEDICAID

## 2023-01-23 VITALS
HEART RATE: 51 BPM | BODY MASS INDEX: 34.28 KG/M2 | WEIGHT: 225.44 LBS | SYSTOLIC BLOOD PRESSURE: 144 MMHG | DIASTOLIC BLOOD PRESSURE: 82 MMHG | TEMPERATURE: 97 F

## 2023-01-23 VITALS
WEIGHT: 225.5 LBS | DIASTOLIC BLOOD PRESSURE: 82 MMHG | BODY MASS INDEX: 34.17 KG/M2 | SYSTOLIC BLOOD PRESSURE: 144 MMHG | HEIGHT: 68 IN | HEART RATE: 57 BPM

## 2023-01-23 DIAGNOSIS — R51.9 NONINTRACTABLE EPISODIC HEADACHE, UNSPECIFIED HEADACHE TYPE: ICD-10-CM

## 2023-01-23 DIAGNOSIS — J34.2 NASAL SEPTAL DEVIATION: ICD-10-CM

## 2023-01-23 DIAGNOSIS — M95.0 NASAL DEFORMITY, ACQUIRED: ICD-10-CM

## 2023-01-23 DIAGNOSIS — J45.20 MILD INTERMITTENT ASTHMA WITHOUT COMPLICATION: ICD-10-CM

## 2023-01-23 DIAGNOSIS — M75.111 PARTIAL NONTRAUMATIC RUPTURE OF RIGHT ROTATOR CUFF: Primary | ICD-10-CM

## 2023-01-23 DIAGNOSIS — J30.9 ALLERGIC RHINITIS, UNSPECIFIED SEASONALITY, UNSPECIFIED TRIGGER: ICD-10-CM

## 2023-01-23 DIAGNOSIS — G89.29 CHRONIC RIGHT-SIDED LOW BACK PAIN WITH RIGHT-SIDED SCIATICA: ICD-10-CM

## 2023-01-23 DIAGNOSIS — J34.3 HYPERTROPHY OF BOTH INFERIOR NASAL TURBINATES: ICD-10-CM

## 2023-01-23 DIAGNOSIS — S02.2XXS CLOSED FRACTURE OF NASAL BONE, SEQUELA: Primary | ICD-10-CM

## 2023-01-23 DIAGNOSIS — M54.41 CHRONIC RIGHT-SIDED LOW BACK PAIN WITH RIGHT-SIDED SCIATICA: ICD-10-CM

## 2023-01-23 PROCEDURE — 3008F BODY MASS INDEX DOCD: CPT | Mod: CPTII,,, | Performed by: PHYSICIAN ASSISTANT

## 2023-01-23 PROCEDURE — 20610 DRAIN/INJ JOINT/BURSA W/O US: CPT | Mod: PBBFAC,PN,RT | Performed by: PHYSICIAN ASSISTANT

## 2023-01-23 PROCEDURE — 1159F MED LIST DOCD IN RCRD: CPT | Mod: CPTII,,, | Performed by: SPECIALIST

## 2023-01-23 PROCEDURE — 99499 NO LOS: ICD-10-PCS | Mod: S$PBB,,, | Performed by: PHYSICIAN ASSISTANT

## 2023-01-23 PROCEDURE — 99204 PR OFFICE/OUTPT VISIT, NEW, LEVL IV, 45-59 MIN: ICD-10-PCS | Mod: S$PBB,,, | Performed by: SPECIALIST

## 2023-01-23 PROCEDURE — 1159F MED LIST DOCD IN RCRD: CPT | Mod: CPTII,,, | Performed by: PHYSICIAN ASSISTANT

## 2023-01-23 PROCEDURE — 99499 UNLISTED E&M SERVICE: CPT | Mod: S$PBB,,, | Performed by: PHYSICIAN ASSISTANT

## 2023-01-23 PROCEDURE — 3077F PR MOST RECENT SYSTOLIC BLOOD PRESSURE >= 140 MM HG: ICD-10-PCS | Mod: CPTII,,, | Performed by: SPECIALIST

## 2023-01-23 PROCEDURE — 99999 PR PBB SHADOW E&M-EST. PATIENT-LVL IV: CPT | Mod: PBBFAC,,, | Performed by: PHYSICIAN ASSISTANT

## 2023-01-23 PROCEDURE — 3077F SYST BP >= 140 MM HG: CPT | Mod: CPTII,,, | Performed by: SPECIALIST

## 2023-01-23 PROCEDURE — 3079F PR MOST RECENT DIASTOLIC BLOOD PRESSURE 80-89 MM HG: ICD-10-PCS | Mod: CPTII,,, | Performed by: PHYSICIAN ASSISTANT

## 2023-01-23 PROCEDURE — 20610 DRAIN/INJ JOINT/BURSA W/O US: CPT | Mod: S$PBB,RT,, | Performed by: PHYSICIAN ASSISTANT

## 2023-01-23 PROCEDURE — 3077F PR MOST RECENT SYSTOLIC BLOOD PRESSURE >= 140 MM HG: ICD-10-PCS | Mod: CPTII,,, | Performed by: PHYSICIAN ASSISTANT

## 2023-01-23 PROCEDURE — 1160F PR REVIEW ALL MEDS BY PRESCRIBER/CLIN PHARMACIST DOCUMENTED: ICD-10-PCS | Mod: CPTII,,, | Performed by: SPECIALIST

## 2023-01-23 PROCEDURE — 99999 PR PBB SHADOW E&M-EST. PATIENT-LVL III: ICD-10-PCS | Mod: PBBFAC,,, | Performed by: SPECIALIST

## 2023-01-23 PROCEDURE — 1159F PR MEDICATION LIST DOCUMENTED IN MEDICAL RECORD: ICD-10-PCS | Mod: CPTII,,, | Performed by: PHYSICIAN ASSISTANT

## 2023-01-23 PROCEDURE — 99999 PR PBB SHADOW E&M-EST. PATIENT-LVL III: CPT | Mod: PBBFAC,,, | Performed by: SPECIALIST

## 2023-01-23 PROCEDURE — 3079F PR MOST RECENT DIASTOLIC BLOOD PRESSURE 80-89 MM HG: ICD-10-PCS | Mod: CPTII,,, | Performed by: SPECIALIST

## 2023-01-23 PROCEDURE — 3079F DIAST BP 80-89 MM HG: CPT | Mod: CPTII,,, | Performed by: SPECIALIST

## 2023-01-23 PROCEDURE — 20610 LARGE JOINT ASPIRATION/INJECTION: R SUBACROMIAL BURSA: ICD-10-PCS | Mod: S$PBB,RT,, | Performed by: PHYSICIAN ASSISTANT

## 2023-01-23 PROCEDURE — 3008F PR BODY MASS INDEX (BMI) DOCUMENTED: ICD-10-PCS | Mod: CPTII,,, | Performed by: SPECIALIST

## 2023-01-23 PROCEDURE — 99204 OFFICE O/P NEW MOD 45 MIN: CPT | Mod: S$PBB,,, | Performed by: SPECIALIST

## 2023-01-23 PROCEDURE — 99214 OFFICE O/P EST MOD 30 MIN: CPT | Mod: PBBFAC,PN,25 | Performed by: PHYSICIAN ASSISTANT

## 2023-01-23 PROCEDURE — 1160F RVW MEDS BY RX/DR IN RCRD: CPT | Mod: CPTII,,, | Performed by: PHYSICIAN ASSISTANT

## 2023-01-23 PROCEDURE — 99213 OFFICE O/P EST LOW 20 MIN: CPT | Mod: PBBFAC,25,27,PN | Performed by: SPECIALIST

## 2023-01-23 PROCEDURE — 1159F PR MEDICATION LIST DOCUMENTED IN MEDICAL RECORD: ICD-10-PCS | Mod: CPTII,,, | Performed by: SPECIALIST

## 2023-01-23 PROCEDURE — 99999 PR PBB SHADOW E&M-EST. PATIENT-LVL IV: ICD-10-PCS | Mod: PBBFAC,,, | Performed by: PHYSICIAN ASSISTANT

## 2023-01-23 PROCEDURE — 3077F SYST BP >= 140 MM HG: CPT | Mod: CPTII,,, | Performed by: PHYSICIAN ASSISTANT

## 2023-01-23 PROCEDURE — 1160F PR REVIEW ALL MEDS BY PRESCRIBER/CLIN PHARMACIST DOCUMENTED: ICD-10-PCS | Mod: CPTII,,, | Performed by: PHYSICIAN ASSISTANT

## 2023-01-23 PROCEDURE — 1160F RVW MEDS BY RX/DR IN RCRD: CPT | Mod: CPTII,,, | Performed by: SPECIALIST

## 2023-01-23 PROCEDURE — 3079F DIAST BP 80-89 MM HG: CPT | Mod: CPTII,,, | Performed by: PHYSICIAN ASSISTANT

## 2023-01-23 PROCEDURE — 20610 DRAIN/INJ JOINT/BURSA W/O US: CPT | Mod: PBBFAC,PN | Performed by: PHYSICIAN ASSISTANT

## 2023-01-23 PROCEDURE — 3008F PR BODY MASS INDEX (BMI) DOCUMENTED: ICD-10-PCS | Mod: CPTII,,, | Performed by: PHYSICIAN ASSISTANT

## 2023-01-23 PROCEDURE — 3008F BODY MASS INDEX DOCD: CPT | Mod: CPTII,,, | Performed by: SPECIALIST

## 2023-01-23 RX ORDER — TRIAMCINOLONE ACETONIDE 40 MG/ML
40 INJECTION, SUSPENSION INTRA-ARTICULAR; INTRAMUSCULAR
Status: DISCONTINUED | OUTPATIENT
Start: 2023-01-23 | End: 2023-01-23 | Stop reason: HOSPADM

## 2023-01-23 RX ORDER — NIFEDIPINE 30 MG/1
30 TABLET, EXTENDED RELEASE ORAL DAILY
COMMUNITY
Start: 2022-10-10

## 2023-01-23 RX ORDER — FLUTICASONE PROPIONATE 50 MCG
2 SPRAY, SUSPENSION (ML) NASAL DAILY
Qty: 18.2 ML | Refills: 11 | Status: SHIPPED | OUTPATIENT
Start: 2023-01-23

## 2023-01-23 RX ORDER — ALBUTEROL SULFATE 90 UG/1
2 AEROSOL, METERED RESPIRATORY (INHALATION) 4 TIMES DAILY PRN
COMMUNITY
Start: 2022-12-21

## 2023-01-23 RX ADMIN — TRIAMCINOLONE ACETONIDE 40 MG: 40 INJECTION, SUSPENSION INTRA-ARTICULAR; INTRAMUSCULAR at 11:01

## 2023-01-23 NOTE — PROGRESS NOTES
Subjective:      Patient ID: Raquel Finch is a 57 y.o. female.    Chief Complaint: Pain of the Right Shoulder      57-year-old female follow-up right shoulder partial rotator cuff tear.  She is here for a right subacromial corticosteroid injection which usually helps for about 6-8 months.  She would also like a referral to a spine specialist.      Review of Systems   Constitutional: Negative for chills and fever.   Cardiovascular:  Negative for chest pain.   Respiratory:  Negative for cough.    Hematologic/Lymphatic: Does not bruise/bleed easily.   Skin:  Negative for poor wound healing and rash.   Musculoskeletal:  Positive for joint pain, myalgias and stiffness.   Gastrointestinal:  Negative for abdominal pain.   Genitourinary:  Negative for bladder incontinence.   Neurological:  Negative for dizziness, loss of balance and weakness.   Psychiatric/Behavioral:  Negative for altered mental status.      Review of patient's allergies indicates:  No Known Allergies     Current Outpatient Medications   Medication Sig Dispense Refill    albuterol (PROVENTIL/VENTOLIN HFA) 90 mcg/actuation inhaler 2 puffs 4 (four) times daily as needed.      ALPRAZolam (XANAX) 0.5 MG tablet Take 0.5 mg by mouth 2 (two) times daily as needed.      atorvastatin (LIPITOR) 20 MG tablet TK 1 T PO  QHS      budesonide-formoterol 160-4.5 mcg (SYMBICORT) 160-4.5 mcg/actuation HFAA 2 puffs.      cetirizine (ZYRTEC) 10 MG tablet TAKE 1 TABLET BY MOUTH DAILY AS NEEDED FOR SINUS      fluticasone propionate (FLOVENT HFA) 220 mcg/actuation inhaler 2 puffs.      gabapentin (NEURONTIN) 300 MG capsule Take 300 mg by mouth nightly.      hydroCHLOROthiazide (MICROZIDE) 12.5 mg capsule Take 12.5 mg by mouth once daily.      ibandronate (BONIVA) 150 mg tablet Take 150 mg by mouth every 30 days.      montelukast (SINGULAIR) 10 mg tablet Take 10 mg by mouth every evening.      nebulizer and compressor Domonique as per administration instructions      NIFEdipine  "(PROCARDIA-XL) 30 MG (OSM) 24 hr tablet Take 30 mg by mouth.      paroxetine (PAXIL) 40 MG tablet Take 40 mg by mouth once daily.      tiZANidine (ZANAFLEX) 4 MG tablet        No current facility-administered medications for this visit.        The patient's relevant past medical, surgical, and social history was reviewed in Epic.       Objective:      VITAL SIGNS: BP (!) 144/82   Pulse (!) 57   Ht 5' 8" (1.727 m)   Wt 102.3 kg (225 lb 8.5 oz)   BMI 34.29 kg/m²     Ortho/SPM Exam         Assessment:       1. Partial nontraumatic rupture of right rotator cuff    2. Chronic right-sided low back pain with right-sided sciatica          Plan:         Raquel was seen today for pain.    Diagnoses and all orders for this visit:    Partial nontraumatic rupture of right rotator cuff  -     Large Joint Aspiration/Injection: R subacromial bursa    Chronic right-sided low back pain with right-sided sciatica  -     Ambulatory referral/consult to Orthopedics; Future    Right shoulder subacromial corticosteroid injection given today.  We will see how this does.  Follow-up another 6-8 months.  Spine referral sent to [facility] due to her insurance.    Diagnoses and plan discussed with the patient, as well as the expected course and duration of his symptoms.  All questions and concerns were addressed prior to the end of the visit.   Instructed patient to call office if they have any future questions/concerns or to schedule apt. Patient will return to see me if symptoms worsen or fail to improve    Note dictated with voice recognition software, please excuse any grammatical errors.        Cyndy Goldman PA-C   01/23/2023    "

## 2023-01-23 NOTE — PROCEDURES
Large Joint Aspiration/Injection: R subacromial bursa    Date/Time: 1/23/2023 11:00 AM  Performed by: Cyndy Goldman PA-C  Authorized by: Cyndy Goldman PA-C     Consent Done?:  Yes (Verbal)  Indications:  Pain  Site marked: the procedure site was marked    Timeout: prior to procedure the correct patient, procedure, and site was verified    Prep: patient was prepped and draped in usual sterile fashion      Local anesthesia used?: Yes    Local anesthetic:  Lidocaine 1% without epinephrine and topical anesthetic    Details:  Needle Size:  21 G  Ultrasonic Guidance for needle placement?: No    Approach:  Posterior  Location:  Shoulder  Site:  R subacromial bursa  Medications:  40 mg triamcinolone acetonide 40 mg/mL  Patient tolerance:  Patient tolerated the procedure well with no immediate complications

## 2023-01-24 ENCOUNTER — OFFICE VISIT (OUTPATIENT)
Dept: PODIATRY | Facility: CLINIC | Age: 58
End: 2023-01-24
Payer: MEDICAID

## 2023-01-24 VITALS — RESPIRATION RATE: 18 BRPM | WEIGHT: 222.56 LBS | HEIGHT: 68 IN | BODY MASS INDEX: 33.73 KG/M2

## 2023-01-24 DIAGNOSIS — M79.671 RIGHT FOOT PAIN: Primary | ICD-10-CM

## 2023-01-24 DIAGNOSIS — S93.509A SPRAIN OF TOE, INITIAL ENCOUNTER: Primary | ICD-10-CM

## 2023-01-24 PROBLEM — Z86.010 PERSONAL HISTORY OF COLONIC POLYPS: Status: ACTIVE | Noted: 2023-01-24

## 2023-01-24 PROBLEM — Z12.11 SCREENING FOR MALIGNANT NEOPLASM OF COLON: Status: RESOLVED | Noted: 2017-12-13 | Resolved: 2023-01-24

## 2023-01-24 PROBLEM — Z86.0100 PERSONAL HISTORY OF COLONIC POLYPS: Status: ACTIVE | Noted: 2023-01-24

## 2023-01-24 PROCEDURE — 99213 OFFICE O/P EST LOW 20 MIN: CPT | Mod: 25,S$PBB,, | Performed by: PODIATRIST

## 2023-01-24 PROCEDURE — 99999 PR PBB SHADOW E&M-EST. PATIENT-LVL IV: ICD-10-PCS | Mod: PBBFAC,,, | Performed by: PODIATRIST

## 2023-01-24 PROCEDURE — 99213 PR OFFICE/OUTPT VISIT, EST, LEVL III, 20-29 MIN: ICD-10-PCS | Mod: 25,S$PBB,, | Performed by: PODIATRIST

## 2023-01-24 PROCEDURE — 1160F PR REVIEW ALL MEDS BY PRESCRIBER/CLIN PHARMACIST DOCUMENTED: ICD-10-PCS | Mod: CPTII,,, | Performed by: PODIATRIST

## 2023-01-24 PROCEDURE — 29550 STRAPPING OF TOES: CPT | Mod: S$PBB,RT,, | Performed by: PODIATRIST

## 2023-01-24 PROCEDURE — 99214 OFFICE O/P EST MOD 30 MIN: CPT | Mod: PBBFAC,PO | Performed by: PODIATRIST

## 2023-01-24 PROCEDURE — 3008F PR BODY MASS INDEX (BMI) DOCUMENTED: ICD-10-PCS | Mod: CPTII,,, | Performed by: PODIATRIST

## 2023-01-24 PROCEDURE — 3008F BODY MASS INDEX DOCD: CPT | Mod: CPTII,,, | Performed by: PODIATRIST

## 2023-01-24 PROCEDURE — 1159F MED LIST DOCD IN RCRD: CPT | Mod: CPTII,,, | Performed by: PODIATRIST

## 2023-01-24 PROCEDURE — 29550 PR STRAPPING OF TOES: ICD-10-PCS | Mod: S$PBB,RT,, | Performed by: PODIATRIST

## 2023-01-24 PROCEDURE — 1160F RVW MEDS BY RX/DR IN RCRD: CPT | Mod: CPTII,,, | Performed by: PODIATRIST

## 2023-01-24 PROCEDURE — 1159F PR MEDICATION LIST DOCUMENTED IN MEDICAL RECORD: ICD-10-PCS | Mod: CPTII,,, | Performed by: PODIATRIST

## 2023-01-24 PROCEDURE — 29550 STRAPPING OF TOES: CPT | Mod: PBBFAC,PO,RT | Performed by: PODIATRIST

## 2023-01-24 PROCEDURE — 99999 PR PBB SHADOW E&M-EST. PATIENT-LVL IV: CPT | Mod: PBBFAC,,, | Performed by: PODIATRIST

## 2023-01-24 NOTE — PROGRESS NOTES
[facility] - PODIATRY  [address] [place] [address], [address]  [facility] [id number]  Dept: [phone]  Dept Fax: [phone]    Chidi Martin Jr., ARIELLE     Assessment:   MDM    Coding  1. Sprain of toe, initial encounter            Plan:     Procedures  1. Sprain of toe, initial encounter      Raquel was seen today for foot injury.    Diagnoses and all orders for this visit:    Sprain of toe, initial encounter        -pt seen, evaluated, and managed  -dx discussed in detail. All questions/concerns addressed  -all tx options discussed. All alternatives, risks, benefits of all txs discussed  -pt would benefit from / Pt opts for a trial of non-operative management  - The utilization of NSAIDs can be considered but their benefit has to be tempered against the risk of GI/ concerns  - A steroid injection can be undertaken.  We did discuss the potential mechanism of action of this shot.  Understanding that multiple injections at the same anatomic site do have deleterious effects on the soft tissue.  Generic risks include: steroid flare (advised to ice if necessary), skin hypo-pgimentation (which can be permanent and unsightly), elevation of blood sugar, subcutaneous atrophy (can be permanent) and infection.   -XR/imaging reviewed by me: agree with read. No fx on XR  -labs reviewed by me: otc meds for pain  -implemented icing/stretching regimen  -toe strapping applied to the R foot  -educated on richelle splinting at [facility]     -rxs dispensed: none  -referrals: none  -WB: wbat      Follow up if symptoms worsen or fail to improve.    Subjective:      Patient ID: Raquel Finch is a 57 y.o. female.    Chief Complaint:   Chief Complaint   Patient presents with    Foot Injury     Right third toe        CC - foot pain: patient presents to the podiatry clinic  with complaint of  right foot pain. Onset of the symptoms was several days ago. Precipitating event: unk. Current symptoms include: ability to bear weight, but  with some pain, stiffness, swelling and worsening symptoms after a period of activity. Aggravating factors: walking and certain shoegear. Symptoms have gradually worsened. Patient has had no prior foot problems. Evaluation to date: none. Treatment to date: none. Patients rates pain 4/10 on pain scale.      HPI    Last Podiatry Enc: Visit date not found  Last Enc w/ Me: Visit date not found    Outside reports reviewed: historical medical records.  Family hx: as below  Past Medical History:   Diagnosis Date    Anemia     Arthritis     Colon polyps 04/2012    Elevated CPK     Encounter for blood transfusion     Hypertension     Kidney stones     Personal history of colonic polyps 1/24/2023    PUD (peptic ulcer disease)      Past Surgical History:   Procedure Laterality Date    COLONOSCOPY N/A 12/13/2017    Procedure: COLONOSCOPY;  Surgeon: Laxmi Crow MD;  Location: [facility];  Service: Endoscopy;  Laterality: N/A;    COLONOSCOPY W/ POLYPECTOMY      COLONOSCOPY W/ POLYPECTOMY      CYSTOSCOPY W/ STONE MANIPULATION Right 03/02/2017    DILATION AND CURETTAGE OF UTERUS      ESOPHAGOGASTRODUODENOSCOPY      ESOPHAGOGASTRODUODENOSCOPY      TUBAL LIGATION       Family History   Problem Relation Age of Onset    Hypertension Mother     Heart disease Mother     Hypertension Father     Stroke Father      Current Outpatient Medications   Medication Sig Dispense Refill    albuterol (PROVENTIL/VENTOLIN HFA) 90 mcg/actuation inhaler 2 puffs 4 (four) times daily as needed.      ALPRAZolam (XANAX) 0.5 MG tablet Take 0.5 mg by mouth 2 (two) times daily as needed.      atorvastatin (LIPITOR) 20 MG tablet TK 1 T PO  QHS      budesonide-formoterol 160-4.5 mcg (SYMBICORT) 160-4.5 mcg/actuation HFAA 2 puffs.      cetirizine (ZYRTEC) 10 MG tablet TAKE 1 TABLET BY MOUTH DAILY AS NEEDED FOR SINUS      fluticasone propionate (FLONASE) 50 mcg/actuation nasal spray 2 sprays (100 mcg total) by Each Nostril route once daily. 18.2 mL 11     fluticasone propionate (FLOVENT HFA) 220 mcg/actuation inhaler 2 puffs.      gabapentin (NEURONTIN) 300 MG capsule Take 300 mg by mouth nightly.      hydroCHLOROthiazide (MICROZIDE) 12.5 mg capsule Take 12.5 mg by mouth once daily.      ibandronate (BONIVA) 150 mg tablet Take 150 mg by mouth every 30 days.      montelukast (SINGULAIR) 10 mg tablet Take 10 mg by mouth every evening.      nebulizer and compressor Domonique as per administration instructions      NIFEdipine (PROCARDIA-XL) 30 MG (OSM) 24 hr tablet Take 30 mg by mouth.      paroxetine (PAXIL) 40 MG tablet Take 40 mg by mouth once daily.      tiZANidine (ZANAFLEX) 4 MG tablet        No current facility-administered medications for this visit.     Review of patient's allergies indicates:  No Known Allergies  Social History     Socioeconomic History    Marital status:    Tobacco Use    Smoking status: Never    Smokeless tobacco: Never   Substance and Sexual Activity    Alcohol use: No    Drug use: No    Sexual activity: Yes     Partners: Male     Social Determinants of Health     Financial Resource Strain: Low Risk     Difficulty of Paying Living Expenses: Not very hard   Food Insecurity: No Food Insecurity    Worried About Running Out of Food in the Last Year: Never true    Ran Out of Food in the Last Year: Never true   Transportation Needs: No Transportation Needs    Lack of Transportation (Medical): No    Lack of Transportation (Non-Medical): No   Physical Activity: Inactive    Days of Exercise per Week: 0 days    Minutes of Exercise per Session: 0 min   Stress: No Stress Concern Present    Feeling of Stress : Only a little   Social Connections: Unknown    Frequency of Communication with Friends and Family: More than three times a week    Frequency of Social Gatherings with Friends and Family: More than three times a week    Attends Latter-day Services: Patient refused    Active Member of Clubs or Organizations: No    Attends Club or Organization  "Meetings: Never    Marital Status:    Housing Stability: Unknown    Unable to Pay for Housing in the Last Year: No    Unstable Housing in the Last Year: No       ROS    REVIEW OF SYSTEMS: Negative as documented below as well as positive findings in bold.       Constitutional  Respiratory  Gastrointestinal  Skin   - Fever - Cough - Heartburn - Rash   - Chills - Spit blood - Nausea - Itching   - Weight Loss - Shortness of breath - Vomiting - Nail pain   - Malaise/Fatigue - Wheezing - Abdominal Pain  Wound/Ulcer   - Weight Gain   - Blood in Stool  Poor wound healing       - Diarrhea          Cardiovascular  Genitourinary  Neurological  HEENT   - Chest Pain - Dysuria - Burning Sensation of feet - Headache   - Palpitations - Hematuria - Tingling / Paresthesia - Congestion   - Pain at night in legs - Flank Pain - Dizziness - Sore Throat   - Cramping   - Tremor - Blurred Vision   - Leg Swelling   - Sensory Change - Double Vision   - Dizzy when standing   - Speech Change - Eye Redness       - Focal Weakness - Dry Eyes       - Loss of Consciousness          Endocrine  Musculoskeletal  Psychiatric   - Cold intolerance - Muscle Pain - Depression   - Heat intolerance - Neck Pain - Insomnia   - Anemia - Joint Pain - Memory Loss   -  Easy bruising, bleeding - Heel pain - Anxiety      Toe Pain        Leg/Ankle/Foot Pain         Objective:     Resp 18   Ht 5' 8" (1.727 m)   Wt 101 kg (222 lb 8.9 oz)   BMI 33.84 kg/m²   Vitals:    01/24/23 1354   Resp: 18   Weight: 101 kg (222 lb 8.9 oz)   Height: 5' 8" (1.727 m)   PainSc:   4   PainLoc: Toe       Physical Exam    General Appearance:   Patient appears well developed, well nourished  Patient appears stated age    Psychiatric:   Patient is oriented to time, place, and person.  Patient has appropriate mood and affect    Neck:  Trachea Midline  No visible masses    Respiratory/Ears:  No distress or labored breathing.  Able to differentiate between normal talking voice and " whisper.  Able to follow commands    Eyes:  Visual Acuity intact  Lids and conjunctivae normal. No discoloration noted.    Foot Exam  Physical Exam  Ortho Exam  Ortho/SPM Exam  Physical Exam  Foot/Ankle Musculoskeletal Exam    R LE exam con't:  V:  DP 2/4, PT 2/4   CRT< 3s to all digits tested   Tibial and popliteal lymph nodes are w/o abnormality   Edema: absent, varicosities: absent    N:  Patient displays normal ankle reflexes   SILT in SP/DP/T/Dora/Saph distributions    Ortho: +Motor EHL/FHL/TA/GA   no deformity present   There is no decreased ROM at the 3rd toe joints: pain is not present, crepitus is not present  There is mild pain with palpation of 3rd PIPJ  Compartments soft/compressible. No pain on passive stretch of big toe. No calf  Pain.    Derm:  skin intact, skin warm and dry, skin without ulcers or lesions, skin without induration, nails normal, no ecchymosis    Imaging / Labs:      X-Ray Foot Complete Right    Result Date: 1/24/2023  EXAM:    XR FOOT COMPLETE 3 VIEW RIGHT CLINICAL HISTORY:     Right foot pain COMPARISON: None. FINDINGS:    Weightbearing lateral x-ray demonstrates pes planus. Bone density and architecture is normal. Midfoot alignment is normal. Truncation of the head of the fifth proximal phalanx is noted of uncertain etiology.  Please correlate with clinical exam. No acute findings.      No acute findings.  See above. Finalized on: 1/24/2023 1:47 PM By:  Eric Royal MD [facility]# [id number]      2023-01-24 13:49:[id number]    BRKELLENG        Note: This was dictated using a computer transcription program. Although proofread, it may contain computer transcription errors and phonetic errors. Other human proofreading errors may also exist. Corrections may be performed at a later time. Please contact us for any clarification if needed.    Chidi Martin DPM  [facility]

## 2023-01-24 NOTE — PATIENT INSTRUCTIONS
R.I.C.E.    R.I.C.E. stands for Rest, Ice, Compression, and Elevation. Doing these things helps limit pain and swelling after an injury. R.I.C.E. also helps injuries heal faster. Use R.I.C.E. for sprains, strains, and severe bruises or bumps. Follow the tips on this handout and begin R.I.C.E. as soon as possible after an injury.     Rest  Pain is your body's way of telling you to rest an injured area. Whether you have hurt an elbow, hand, foot, or knee, limiting its use will prevent further injury and help you heal.     Ice  Applying ice right after an injury helps prevent swelling and reduce pain. Don't place ice directly on your skin.  Wrap a cold pack or bag of ice in a thin cloth. Place it over the injured area.  Ice for 10 minutes every 3 hours. Don't ice for more than 20 minutes at a time.     Compression  Putting pressure (compression) on an injury helps prevent swelling and provides support.  Wrap the injured area firmly with an elastic bandage. If your hand or foot tingles, becomes discolored, or feels cold to the touch, the bandage may be too tight. Rewrap it more loosely.  If your bandage becomes too loose, rewrap it.  Do not wear an elastic bandage overnight.    Elevation  Keeping an injury elevated helps reduce swelling, pain, and throbbing. Elevation is most effective when the injury is kept elevated higher than the heart.     Call your healthcare provider if you notice any of the following:  Fingers or toes feel numb, are cold to the touch, or change color  Skin looks shiny or tight  Pain, swelling, or bruising worsens and is not improved with elevation

## 2023-03-31 ENCOUNTER — OFFICE VISIT (OUTPATIENT)
Dept: GASTROENTEROLOGY | Facility: CLINIC | Age: 58
End: 2023-03-31
Payer: MEDICAID

## 2023-03-31 ENCOUNTER — OFFICE VISIT (OUTPATIENT)
Dept: OTOLARYNGOLOGY | Facility: CLINIC | Age: 58
End: 2023-03-31
Payer: MEDICAID

## 2023-03-31 VITALS — HEIGHT: 68 IN | WEIGHT: 221.88 LBS | BODY MASS INDEX: 33.63 KG/M2

## 2023-03-31 VITALS — SYSTOLIC BLOOD PRESSURE: 134 MMHG | DIASTOLIC BLOOD PRESSURE: 64 MMHG | HEART RATE: 61 BPM

## 2023-03-31 DIAGNOSIS — S02.2XXS CLOSED FRACTURE OF NASAL BONE, SEQUELA: Primary | ICD-10-CM

## 2023-03-31 DIAGNOSIS — M95.0 NASAL DEFORMITY, ACQUIRED: ICD-10-CM

## 2023-03-31 DIAGNOSIS — J34.2 NASAL SEPTAL DEVIATION: ICD-10-CM

## 2023-03-31 DIAGNOSIS — K21.9 GASTROESOPHAGEAL REFLUX DISEASE, UNSPECIFIED WHETHER ESOPHAGITIS PRESENT: Primary | ICD-10-CM

## 2023-03-31 DIAGNOSIS — J30.9 ALLERGIC RHINITIS, UNSPECIFIED SEASONALITY, UNSPECIFIED TRIGGER: ICD-10-CM

## 2023-03-31 PROBLEM — I10 ESSENTIAL HYPERTENSION: Status: ACTIVE | Noted: 2023-03-31

## 2023-03-31 PROCEDURE — 1160F PR REVIEW ALL MEDS BY PRESCRIBER/CLIN PHARMACIST DOCUMENTED: ICD-10-PCS | Mod: CPTII,,, | Performed by: SPECIALIST

## 2023-03-31 PROCEDURE — 3078F DIAST BP <80 MM HG: CPT | Mod: CPTII,,, | Performed by: SPECIALIST

## 2023-03-31 PROCEDURE — 1159F MED LIST DOCD IN RCRD: CPT | Mod: CPTII,,, | Performed by: NURSE PRACTITIONER

## 2023-03-31 PROCEDURE — 1159F PR MEDICATION LIST DOCUMENTED IN MEDICAL RECORD: ICD-10-PCS | Mod: CPTII,,, | Performed by: SPECIALIST

## 2023-03-31 PROCEDURE — 99999 PR PBB SHADOW E&M-EST. PATIENT-LVL III: CPT | Mod: PBBFAC,,, | Performed by: SPECIALIST

## 2023-03-31 PROCEDURE — 99214 OFFICE O/P EST MOD 30 MIN: CPT | Mod: PBBFAC,PO | Performed by: NURSE PRACTITIONER

## 2023-03-31 PROCEDURE — 99213 OFFICE O/P EST LOW 20 MIN: CPT | Mod: PBBFAC,27,PN | Performed by: SPECIALIST

## 2023-03-31 PROCEDURE — 99999 PR PBB SHADOW E&M-EST. PATIENT-LVL IV: CPT | Mod: PBBFAC,,, | Performed by: NURSE PRACTITIONER

## 2023-03-31 PROCEDURE — 99203 PR OFFICE/OUTPT VISIT, NEW, LEVL III, 30-44 MIN: ICD-10-PCS | Mod: S$PBB,,, | Performed by: NURSE PRACTITIONER

## 2023-03-31 PROCEDURE — 1159F PR MEDICATION LIST DOCUMENTED IN MEDICAL RECORD: ICD-10-PCS | Mod: CPTII,,, | Performed by: NURSE PRACTITIONER

## 2023-03-31 PROCEDURE — 3075F PR MOST RECENT SYSTOLIC BLOOD PRESS GE 130-139MM HG: ICD-10-PCS | Mod: CPTII,,, | Performed by: SPECIALIST

## 2023-03-31 PROCEDURE — 3008F BODY MASS INDEX DOCD: CPT | Mod: CPTII,,, | Performed by: NURSE PRACTITIONER

## 2023-03-31 PROCEDURE — 99999 PR PBB SHADOW E&M-EST. PATIENT-LVL III: ICD-10-PCS | Mod: PBBFAC,,, | Performed by: SPECIALIST

## 2023-03-31 PROCEDURE — 99203 OFFICE O/P NEW LOW 30 MIN: CPT | Mod: S$PBB,,, | Performed by: NURSE PRACTITIONER

## 2023-03-31 PROCEDURE — 99213 PR OFFICE/OUTPT VISIT, EST, LEVL III, 20-29 MIN: ICD-10-PCS | Mod: S$PBB,,, | Performed by: SPECIALIST

## 2023-03-31 PROCEDURE — 99213 OFFICE O/P EST LOW 20 MIN: CPT | Mod: S$PBB,,, | Performed by: SPECIALIST

## 2023-03-31 PROCEDURE — 1159F MED LIST DOCD IN RCRD: CPT | Mod: CPTII,,, | Performed by: SPECIALIST

## 2023-03-31 PROCEDURE — 3008F PR BODY MASS INDEX (BMI) DOCUMENTED: ICD-10-PCS | Mod: CPTII,,, | Performed by: NURSE PRACTITIONER

## 2023-03-31 PROCEDURE — 1160F RVW MEDS BY RX/DR IN RCRD: CPT | Mod: CPTII,,, | Performed by: SPECIALIST

## 2023-03-31 PROCEDURE — 1160F PR REVIEW ALL MEDS BY PRESCRIBER/CLIN PHARMACIST DOCUMENTED: ICD-10-PCS | Mod: CPTII,,, | Performed by: NURSE PRACTITIONER

## 2023-03-31 PROCEDURE — 3078F PR MOST RECENT DIASTOLIC BLOOD PRESSURE < 80 MM HG: ICD-10-PCS | Mod: CPTII,,, | Performed by: SPECIALIST

## 2023-03-31 PROCEDURE — 3075F SYST BP GE 130 - 139MM HG: CPT | Mod: CPTII,,, | Performed by: SPECIALIST

## 2023-03-31 PROCEDURE — 1160F RVW MEDS BY RX/DR IN RCRD: CPT | Mod: CPTII,,, | Performed by: NURSE PRACTITIONER

## 2023-03-31 PROCEDURE — 99999 PR PBB SHADOW E&M-EST. PATIENT-LVL IV: ICD-10-PCS | Mod: PBBFAC,,, | Performed by: NURSE PRACTITIONER

## 2023-03-31 RX ORDER — PANTOPRAZOLE SODIUM 40 MG/1
40 TABLET, DELAYED RELEASE ORAL DAILY
COMMUNITY
Start: 2023-03-30

## 2023-03-31 NOTE — PROGRESS NOTES
Subjective:       Patient ID: Raquel Finch is a 58 y.o. female.    Chief Complaint: Gastroesophageal Reflux    57 y/o female with hypertension, GERD, and hx of PUD (2017) and sleeve gastrectomy (11/2018) referred by PCP for worsening reflux symptoms. Patient states heartburn no longer controlled with daily PPI. Takes Malaika [place] prn for additional relief. Intermittent solid food dysphagia. Repeatedly swallows to resolve globus sensation. Avoids spicy and acidic foods and carbonated beverages. Follows bariatric diet. No abdominal pain, constipation or diarrhea.     Endoscopy  - 1/24/2023 C scope: Haustrations increased. This is a precursor to                          diverticular disease, which is present but is far                          less severe than the haustrations.                          - Significant colonic spasm.                          - Internal hemorrhoids.                          - Diverticulosis in the sigmoid colon.  - 1/11/2018 EGD: Normal esophagus. Biopsied given persistent gerd                         symptoms.                         - Normal stomach.                         - Normal examined duodenum.   - 12/13/2017 EGD: Acute gastritis. Biopsied.                         - Normal examined duodenum.                         - Normal esophagus.                         - Non-bleeding gastric ulcer with no stigmata of                         bleeding.   - 12/13/2017 Cscope: Diverticulosis in the sigmoid colon.                         - The examination was otherwise normal on direct                         and retroflexion views.   Past Medical History:   Diagnosis Date    Anemia     Arthritis     Colon polyps 04/2012    Elevated CPK     Encounter for blood transfusion     Hypertension     Kidney stones     Osteoporosis     Personal history of colonic polyps 01/24/2023    PUD (peptic ulcer disease)        Past Surgical History:   Procedure Laterality Date    COLONOSCOPY N/A 12/13/2017     "Procedure: COLONOSCOPY;  Surgeon: Laxmi Crow MD;  Location: [facility] ENDO;  Service: Endoscopy;  Laterality: N/A;    COLONOSCOPY N/A 1/24/2023    Procedure: COLONOSCOPY;  Surgeon: Lima Rojas MD;  Location: [facility] ENDO;  Service: Endoscopy;  Laterality: N/A;    COLONOSCOPY W/ POLYPECTOMY      COLONOSCOPY W/ POLYPECTOMY      CYSTOSCOPY W/ STONE MANIPULATION Right 03/02/2017    DILATION AND CURETTAGE OF UTERUS      ESOPHAGOGASTRODUODENOSCOPY      ESOPHAGOGASTRODUODENOSCOPY      TUBAL LIGATION         Family History   Problem Relation Age of Onset    Hypertension Mother     Heart disease Mother     Hypertension Father     Stroke Father        Social History     Socioeconomic History    Marital status:    Tobacco Use    Smoking status: Never    Smokeless tobacco: Never   Substance and Sexual Activity    Alcohol use: No    Drug use: No    Sexual activity: Yes     Partners: Male       Review of Systems   Constitutional:  Negative for appetite change and unexpected weight change.   HENT:  Negative for trouble swallowing.    Respiratory:  Negative for shortness of breath.    Cardiovascular:  Negative for chest pain.   Gastrointestinal:  Negative for abdominal pain, constipation and diarrhea.        +GERD see HPI   Hematological:  Negative for adenopathy.   Psychiatric/Behavioral:  Negative for dysphoric mood.        Objective:     Vitals:    03/31/23 0829   Weight: 100.7 kg (221 lb 14.4 oz)   Height: 5' 8" (1.727 m)          Physical Exam  Constitutional:       General: She is not in acute distress.  HENT:      Head: Normocephalic.   Eyes:      Conjunctiva/sclera: Conjunctivae normal.      Pupils: Pupils are equal, round, and reactive to light.   Pulmonary:      Effort: Pulmonary effort is normal. No respiratory distress.   Musculoskeletal:         General: Normal range of motion.      Cervical back: Normal range of motion.   Skin:     General: Skin is warm and dry.   Neurological:      Mental Status: She is " alert and oriented to person, place, and time.   Psychiatric:         Mood and Affect: Mood normal.         Behavior: Behavior normal.             Assessment:         ICD-10-CM ICD-9-CM   1. Gastroesophageal reflux disease, unspecified whether esophagitis present  K21.9 530.81       Plan:       Gastroesophageal reflux disease, unspecified whether esophagitis present  -     Case Request Endoscopy: EGD (ESOPHAGOGASTRODUODENOSCOPY)  -     continue pantoprazole 40 mg every morning  -     OTC famotidine 20-40 mg every night  -     Discussed elimination of dietary triggers (fatty foods, caffeine, chocolate, spicy foods, food with high fat content, carbonated beverages, and peppermint.        Follow up if symptoms worsen or fail to improve.     Patient's Medications   New Prescriptions    No medications on file   Previous Medications    ALBUTEROL (PROVENTIL/VENTOLIN HFA) 90 MCG/ACTUATION INHALER    2 puffs 4 (four) times daily as needed.    ALPRAZOLAM (XANAX) 0.5 MG TABLET    Take 0.5 mg by mouth 2 (two) times daily as needed.    ATORVASTATIN (LIPITOR) 20 MG TABLET    TK 1 T PO  QHS    BUDESONIDE-FORMOTEROL 160-4.5 MCG (SYMBICORT) 160-4.5 MCG/ACTUATION HFAA    2 puffs.    CETIRIZINE (ZYRTEC) 10 MG TABLET    TAKE 1 TABLET BY MOUTH DAILY AS NEEDED FOR SINUS    FLUTICASONE PROPIONATE (FLONASE) 50 MCG/ACTUATION NASAL SPRAY    2 sprays (100 mcg total) by Each Nostril route once daily.    FLUTICASONE PROPIONATE (FLOVENT HFA) 220 MCG/ACTUATION INHALER    2 puffs.    GABAPENTIN (NEURONTIN) 300 MG CAPSULE    Take 300 mg by mouth nightly.    HYDROCHLOROTHIAZIDE (MICROZIDE) 12.5 MG CAPSULE    Take 12.5 mg by mouth once daily.    IBANDRONATE (BONIVA) 150 MG TABLET    Take 150 mg by mouth every 30 days.    MONTELUKAST (SINGULAIR) 10 MG TABLET    Take 10 mg by mouth every evening.    NEBULIZER AND COMPRESSOR MARIPOSA    as per administration instructions    NIFEDIPINE (PROCARDIA-XL) 30 MG (OSM) 24 HR TABLET    Take 30 mg by mouth.     PANTOPRAZOLE (PROTONIX) 40 MG TABLET    Take 40 mg by mouth.    PAROXETINE (PAXIL) 40 MG TABLET    Take 40 mg by mouth once daily.    TIZANIDINE (ZANAFLEX) 4 MG TABLET       Modified Medications    No medications on file   Discontinued Medications    No medications on file
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
## 2023-03-31 NOTE — PATIENT INSTRUCTIONS
- continue pantoprazole 40 mg every morning  - OTC famotidine 20-40 mg every night      EGD Prep Instructions    [facility] [address] [place]  [address]    You are scheduled for an EGD with Dr. Rojas on 5/25/2023 at [facility].  You will enter through the [facility] entrance and check in at Same Day Surgery.    Start a CLEAR LIQUID DIET at 5 pm ONE DAY BEFORE YOUR PROCEDURE.  This means no solid food after 5 pm.   CLEAR LIQUID DIET:   - Avoid Red, Orange, Purple, and/or Blue food coloring   - NO DAIRY   - You can have:  Coffee with sugar (no creamer), tea, water, soda, apple or white grape juice, chicken or beef broth/bouillon (no meat, noodles, or veggies), green/yellow popsicles, green/yellow Jell-O, lemonade.       Nothing to eat or drink after midnight before the procedure.  You MAY brush your teeth.    You MAY take your blood pressure, heart, and seizure medication on the morning of the procedure, with a SIP of water.  Hold ALL other medications until after the procedure.    You must have someone with you to DRIVE YOU HOME since you will be receiving IV sedation for the procedure.    If you are on blood thinners THAT YOU HAVE BEEN INSTRUCTED TO HOLD BY YOUR DOCTOR FOR THIS PROCEDURE, then do NOT take this the morning of your EGD.  Do NOT stop these medications on your own, they must be approved to be held by your doctor.  Your EGD can NOT be done if you are on these medications.  Examples of blood thinners include: Coumadin, Aggrenox, Plavix, Pradaxa, Reapro, Pletal, Xarelto, Ticagrelor, Brilinta, Eliquis, and high dose aspirin (325 mg).  You do not have to stop baby aspirin 81 mg.    You will receive a call a few days before your EGD to tell you the time to arrive.  If you have not received a call by the day before your procedure, call the Pre-op Coordinator at [phone].

## 2023-06-05 ENCOUNTER — TELEPHONE (OUTPATIENT)
Dept: GASTROENTEROLOGY | Facility: CLINIC | Age: 58
End: 2023-06-05
Payer: MEDICAID

## 2023-06-05 NOTE — TELEPHONE ENCOUNTER
Patient informed of test results and medical recommendations to be on a bariatric diet with frequent small portions.     ----- Message from Cindy Vera MA sent at 6/5/2023 10:53 AM CDT -----  Contact: pt    ----- Message -----  From: Milagros Cisse  Sent: 6/5/2023  10:40 AM CDT  To: Crystal LITTLE Staff    Type:  Test Results    Who Called: pt   Name of Test (Lab/Mammo/Etc): EGD  Date of Test: 05/25  Ordering Provider: Crystal  Where the test was performed: [facility]  Would the patient rather a call back or a response via [facility]? call  Best Call Back Number: [phone]  Additional Information:

## 2023-07-13 ENCOUNTER — OFFICE VISIT (OUTPATIENT)
Dept: UROLOGY | Facility: CLINIC | Age: 58
End: 2023-07-13
Payer: MEDICAID

## 2023-07-13 VITALS
HEIGHT: 68 IN | DIASTOLIC BLOOD PRESSURE: 58 MMHG | BODY MASS INDEX: 34.59 KG/M2 | HEART RATE: 53 BPM | SYSTOLIC BLOOD PRESSURE: 138 MMHG | WEIGHT: 228.19 LBS

## 2023-07-13 DIAGNOSIS — R10.2 SUPRAPUBIC PRESSURE: ICD-10-CM

## 2023-07-13 DIAGNOSIS — R10.31 RLQ ABDOMINAL PAIN: ICD-10-CM

## 2023-07-13 DIAGNOSIS — N20.0 KIDNEY STONE: Primary | ICD-10-CM

## 2023-07-13 DIAGNOSIS — N23 RENAL COLIC ON RIGHT SIDE: ICD-10-CM

## 2023-07-13 DIAGNOSIS — R35.0 URINARY FREQUENCY: ICD-10-CM

## 2023-07-13 DIAGNOSIS — R39.15 URINARY URGENCY: ICD-10-CM

## 2023-07-13 LAB
BILIRUB UR QL STRIP: NEGATIVE
CLARITY UR REFRACT.AUTO: CLEAR
COLOR UR AUTO: YELLOW
GLUCOSE UR QL STRIP: NEGATIVE
HGB UR QL STRIP: NEGATIVE
KETONES UR QL STRIP: NEGATIVE
LEUKOCYTE ESTERASE UR QL STRIP: NEGATIVE
NITRITE UR QL STRIP: NEGATIVE
PH UR STRIP: 7 [PH] (ref 5–8)
PROT UR QL STRIP: NEGATIVE
SP GR UR STRIP: 1.01 (ref 1–1.03)
URN SPEC COLLECT METH UR: NORMAL

## 2023-07-13 PROCEDURE — 1160F PR REVIEW ALL MEDS BY PRESCRIBER/CLIN PHARMACIST DOCUMENTED: ICD-10-PCS | Mod: CPTII,,, | Performed by: NURSE PRACTITIONER

## 2023-07-13 PROCEDURE — 3075F SYST BP GE 130 - 139MM HG: CPT | Mod: CPTII,,, | Performed by: NURSE PRACTITIONER

## 2023-07-13 PROCEDURE — 3078F DIAST BP <80 MM HG: CPT | Mod: CPTII,,, | Performed by: NURSE PRACTITIONER

## 2023-07-13 PROCEDURE — 99215 OFFICE O/P EST HI 40 MIN: CPT | Mod: PBBFAC,PO | Performed by: NURSE PRACTITIONER

## 2023-07-13 PROCEDURE — 1159F PR MEDICATION LIST DOCUMENTED IN MEDICAL RECORD: ICD-10-PCS | Mod: CPTII,,, | Performed by: NURSE PRACTITIONER

## 2023-07-13 PROCEDURE — 1159F MED LIST DOCD IN RCRD: CPT | Mod: CPTII,,, | Performed by: NURSE PRACTITIONER

## 2023-07-13 PROCEDURE — 3008F PR BODY MASS INDEX (BMI) DOCUMENTED: ICD-10-PCS | Mod: CPTII,,, | Performed by: NURSE PRACTITIONER

## 2023-07-13 PROCEDURE — 87086 URINE CULTURE/COLONY COUNT: CPT | Performed by: NURSE PRACTITIONER

## 2023-07-13 PROCEDURE — 81003 URINALYSIS AUTO W/O SCOPE: CPT | Performed by: NURSE PRACTITIONER

## 2023-07-13 PROCEDURE — 1160F RVW MEDS BY RX/DR IN RCRD: CPT | Mod: CPTII,,, | Performed by: NURSE PRACTITIONER

## 2023-07-13 PROCEDURE — 3075F PR MOST RECENT SYSTOLIC BLOOD PRESS GE 130-139MM HG: ICD-10-PCS | Mod: CPTII,,, | Performed by: NURSE PRACTITIONER

## 2023-07-13 PROCEDURE — 99999 PR PBB SHADOW E&M-EST. PATIENT-LVL V: ICD-10-PCS | Mod: PBBFAC,,, | Performed by: NURSE PRACTITIONER

## 2023-07-13 PROCEDURE — 3008F BODY MASS INDEX DOCD: CPT | Mod: CPTII,,, | Performed by: NURSE PRACTITIONER

## 2023-07-13 PROCEDURE — 99999 PR PBB SHADOW E&M-EST. PATIENT-LVL V: CPT | Mod: PBBFAC,,, | Performed by: NURSE PRACTITIONER

## 2023-07-13 PROCEDURE — 99204 OFFICE O/P NEW MOD 45 MIN: CPT | Mod: S$PBB,,, | Performed by: NURSE PRACTITIONER

## 2023-07-13 PROCEDURE — 3078F PR MOST RECENT DIASTOLIC BLOOD PRESSURE < 80 MM HG: ICD-10-PCS | Mod: CPTII,,, | Performed by: NURSE PRACTITIONER

## 2023-07-13 PROCEDURE — 99204 PR OFFICE/OUTPT VISIT, NEW, LEVL IV, 45-59 MIN: ICD-10-PCS | Mod: S$PBB,,, | Performed by: NURSE PRACTITIONER

## 2023-07-13 RX ORDER — CYCLOBENZAPRINE HCL 10 MG
10 TABLET ORAL 3 TIMES DAILY PRN
Qty: 21 TABLET | Refills: 0 | Status: SHIPPED | OUTPATIENT
Start: 2023-07-13 | End: 2023-07-20

## 2023-07-13 RX ORDER — KETOROLAC TROMETHAMINE 10 MG/1
10 TABLET, FILM COATED ORAL EVERY 6 HOURS PRN
Qty: 20 TABLET | Refills: 0 | Status: SHIPPED | OUTPATIENT
Start: 2023-07-13 | End: 2023-07-18

## 2023-07-13 NOTE — PATIENT INSTRUCTIONS
U/A and urine cx today  CT RSS (STAT)  Hold tizanidine 4 mg since medication is not effective. Start taking alternative (flexeril) at this time.   Follow-up pending urine cx and CT results.

## 2023-07-13 NOTE — PROGRESS NOTES
Subjective:       Patient ID: Raquel Finch is a 58 y.o. female.    Chief Complaint: Nephrolithiasis    Patient is new to me. She is a 59 yo AAF who is here today with c/o right flank pain and RLQ abdominal pain. Patient reports having an U/S performed on her kidneys 1-2 months ago, which showed renal stones. Patient states her pain is worsening. She has a hx of nephrolithiasis with lithotripsy and stent in the past (2017).       Flank Pain  This is a new problem. Episode onset: 3 months ago. The problem occurs daily. The problem has been waxing and waning since onset. The pain is present in the costovertebral angle (right). The quality of the pain is described as stabbing and cramping. Radiates to: RLQ. The pain is at a severity of 8/10. The pain is severe. Associated symptoms include abdominal pain (RLQ) and pelvic pain (suprapubic pressure). Pertinent negatives include no dysuria, fever, headaches or weakness. Risk factors include renal stones. She has tried NSAIDs (Advil) for the symptoms. The treatment provided moderate relief.   Review of Systems   Constitutional:  Positive for chills and fatigue. Negative for fever.   Gastrointestinal:  Positive for abdominal pain (RLQ), diarrhea and nausea. Negative for vomiting.   Genitourinary:  Positive for flank pain, frequency, nocturia (x1), pelvic pain (suprapubic pressure) and urgency. Negative for decreased urine volume, difficulty urinating, dysuria, hematuria, vaginal bleeding and vaginal pain.   Neurological:  Negative for dizziness, weakness and headaches.   Psychiatric/Behavioral:  Positive for sleep disturbance.        Objective:      Physical Exam  Vitals and nursing note reviewed.   Constitutional:       General: She is not in acute distress.     Appearance: She is well-developed. She is obese. She is not ill-appearing.   HENT:      Head: Normocephalic and atraumatic.   Eyes:      Pupils: Pupils are equal, round, and reactive to light.   Cardiovascular:       Rate and Rhythm: Bradycardia present.   Pulmonary:      Effort: Pulmonary effort is normal. No respiratory distress.   Abdominal:      Palpations: Abdomen is soft.      Tenderness: There is no abdominal tenderness.   Musculoskeletal:         General: Normal range of motion.      Cervical back: Normal range of motion.   Skin:     General: Skin is warm and dry.   Neurological:      Mental Status: She is alert and oriented to person, place, and time.      Coordination: Coordination normal.   Psychiatric:         Mood and Affect: Mood normal.         Behavior: Behavior normal.         Thought Content: Thought content normal.         Judgment: Judgment normal.       Assessment:       Problem List Items Addressed This Visit    None  Visit Diagnoses       Kidney stone    -  Primary    Relevant Orders    Urine culture    Urinalysis    CT Renal Stone Study ABD Pelvis WO (Completed)    Renal colic on right side        Relevant Medications    ketorolac (TORADOL) 10 mg tablet    cyclobenzaprine (FLEXERIL) 10 MG tablet    Other Relevant Orders    Urine culture    Urinalysis    CT Renal Stone Study ABD Pelvis WO (Completed)    RLQ abdominal pain        Relevant Medications    ketorolac (TORADOL) 10 mg tablet    cyclobenzaprine (FLEXERIL) 10 MG tablet    Other Relevant Orders    Urine culture    Urinalysis    CT Renal Stone Study ABD Pelvis WO (Completed)    Suprapubic pressure        Relevant Orders    Urine culture    Urinalysis    CT Renal Stone Study ABD Pelvis WO (Completed)    Urinary frequency        Relevant Orders    Urine culture    Urinalysis    Urinary urgency        Relevant Orders    Urine culture    Urinalysis            Plan:           Raquel was seen today for nephrolithiasis.    Diagnoses and all orders for this visit:    Kidney stone  -     Urine culture  -     Urinalysis  -     CT Renal Stone Study ABD Pelvis WO; Future    Renal colic on right side  -     Urine culture  -     Urinalysis  -     CT Renal Stone  Study ABD Pelvis WO; Future  -     ketorolac (TORADOL) 10 mg tablet; Take 1 tablet (10 mg total) by mouth every 6 (six) hours as needed for Pain.  -     cyclobenzaprine (FLEXERIL) 10 MG tablet; Take 1 tablet (10 mg total) by mouth 3 (three) times daily as needed for Muscle spasms.    RLQ abdominal pain  -     Urine culture  -     Urinalysis  -     CT Renal Stone Study ABD Pelvis WO; Future  -     ketorolac (TORADOL) 10 mg tablet; Take 1 tablet (10 mg total) by mouth every 6 (six) hours as needed for Pain.  -     cyclobenzaprine (FLEXERIL) 10 MG tablet; Take 1 tablet (10 mg total) by mouth 3 (three) times daily as needed for Muscle spasms.    Suprapubic pressure  -     Urine culture  -     Urinalysis  -     CT Renal Stone Study ABD Pelvis WO; Future    Urinary frequency  -     Urine culture  -     Urinalysis    Urinary urgency  -     Urine culture  -     Urinalysis    Follow-up pending urine cx and CT results.     Patricia Jackson NP

## 2023-07-14 DIAGNOSIS — M25.551 RIGHT HIP PAIN: ICD-10-CM

## 2023-07-14 DIAGNOSIS — M54.9 BACK PAIN: Primary | ICD-10-CM

## 2023-07-15 LAB — BACTERIA UR CULT: NORMAL

## 2023-07-17 ENCOUNTER — TELEPHONE (OUTPATIENT)
Dept: UROLOGY | Facility: CLINIC | Age: 58
End: 2023-07-17
Payer: MEDICAID

## 2023-07-17 NOTE — TELEPHONE ENCOUNTER
I attempted to call pt, no answer - left voicemail stating result note below.    ----- Message from Patricia Jackson NP sent at 7/17/2023 11:48 AM CDT -----  Please inform patient via telephone that her urine cx was normal. She does not have a UTI.

## 2023-09-26 ENCOUNTER — OFFICE VISIT (OUTPATIENT)
Dept: NEUROLOGY | Facility: CLINIC | Age: 58
End: 2023-09-26
Payer: MEDICAID

## 2023-09-26 VITALS
SYSTOLIC BLOOD PRESSURE: 143 MMHG | BODY MASS INDEX: 33.86 KG/M2 | HEART RATE: 57 BPM | WEIGHT: 223.44 LBS | DIASTOLIC BLOOD PRESSURE: 83 MMHG | HEIGHT: 68 IN

## 2023-09-26 DIAGNOSIS — R20.0 LEFT FACIAL NUMBNESS: ICD-10-CM

## 2023-09-26 DIAGNOSIS — H91.22 SUDDEN IDIOPATHIC HEARING LOSS OF LEFT EAR WITH UNRESTRICTED HEARING OF RIGHT EAR: ICD-10-CM

## 2023-09-26 DIAGNOSIS — M54.9 DORSALGIA, UNSPECIFIED: Primary | ICD-10-CM

## 2023-09-26 PROCEDURE — 1159F PR MEDICATION LIST DOCUMENTED IN MEDICAL RECORD: ICD-10-PCS | Mod: CPTII,,, | Performed by: STUDENT IN AN ORGANIZED HEALTH CARE EDUCATION/TRAINING PROGRAM

## 2023-09-26 PROCEDURE — 1159F MED LIST DOCD IN RCRD: CPT | Mod: CPTII,,, | Performed by: STUDENT IN AN ORGANIZED HEALTH CARE EDUCATION/TRAINING PROGRAM

## 2023-09-26 PROCEDURE — 99999 PR PBB SHADOW E&M-EST. PATIENT-LVL IV: ICD-10-PCS | Mod: PBBFAC,,, | Performed by: STUDENT IN AN ORGANIZED HEALTH CARE EDUCATION/TRAINING PROGRAM

## 2023-09-26 PROCEDURE — 3079F DIAST BP 80-89 MM HG: CPT | Mod: CPTII,,, | Performed by: STUDENT IN AN ORGANIZED HEALTH CARE EDUCATION/TRAINING PROGRAM

## 2023-09-26 PROCEDURE — 99214 OFFICE O/P EST MOD 30 MIN: CPT | Mod: PBBFAC | Performed by: STUDENT IN AN ORGANIZED HEALTH CARE EDUCATION/TRAINING PROGRAM

## 2023-09-26 PROCEDURE — 3077F SYST BP >= 140 MM HG: CPT | Mod: CPTII,,, | Performed by: STUDENT IN AN ORGANIZED HEALTH CARE EDUCATION/TRAINING PROGRAM

## 2023-09-26 PROCEDURE — 99999 PR PBB SHADOW E&M-EST. PATIENT-LVL IV: CPT | Mod: PBBFAC,,, | Performed by: STUDENT IN AN ORGANIZED HEALTH CARE EDUCATION/TRAINING PROGRAM

## 2023-09-26 PROCEDURE — 99204 PR OFFICE/OUTPT VISIT, NEW, LEVL IV, 45-59 MIN: ICD-10-PCS | Mod: S$PBB,,, | Performed by: STUDENT IN AN ORGANIZED HEALTH CARE EDUCATION/TRAINING PROGRAM

## 2023-09-26 PROCEDURE — 3077F PR MOST RECENT SYSTOLIC BLOOD PRESSURE >= 140 MM HG: ICD-10-PCS | Mod: CPTII,,, | Performed by: STUDENT IN AN ORGANIZED HEALTH CARE EDUCATION/TRAINING PROGRAM

## 2023-09-26 PROCEDURE — 3008F BODY MASS INDEX DOCD: CPT | Mod: CPTII,,, | Performed by: STUDENT IN AN ORGANIZED HEALTH CARE EDUCATION/TRAINING PROGRAM

## 2023-09-26 PROCEDURE — 3008F PR BODY MASS INDEX (BMI) DOCUMENTED: ICD-10-PCS | Mod: CPTII,,, | Performed by: STUDENT IN AN ORGANIZED HEALTH CARE EDUCATION/TRAINING PROGRAM

## 2023-09-26 PROCEDURE — 99204 OFFICE O/P NEW MOD 45 MIN: CPT | Mod: S$PBB,,, | Performed by: STUDENT IN AN ORGANIZED HEALTH CARE EDUCATION/TRAINING PROGRAM

## 2023-09-26 PROCEDURE — 3079F PR MOST RECENT DIASTOLIC BLOOD PRESSURE 80-89 MM HG: ICD-10-PCS | Mod: CPTII,,, | Performed by: STUDENT IN AN ORGANIZED HEALTH CARE EDUCATION/TRAINING PROGRAM

## 2023-09-26 NOTE — PROGRESS NOTES
"[facility]  Hospital Follow-up Clinic Visit    Patient Name: Raquel Finch  MRN: [id number]  Date: 9/26/23  Referring Provider: Aaareferral Self    CC: Left facial numbness, sciatic nerve pain    SUBJECTIVE:      History of Present Illness: Raquel Finch is a right-handed 58 y.o. female with a past medical history significant for HTN presenting for follow-up for left-sided facial numbness.     She also reports left facial numbness that was evaluated in the ED 07/2023 - symptoms started with her ear, left neck, left face (V1-V3). She denies ear pain, hearing loss, drainage. The numb feeling was felt on the inside of her ear. Per ED HPI: "She felt like she had water or wax in her ear.  She used swimmer's ear drops and noted at that time she was experiencing numbness in her ear canal and exterior ear as well.  Today when she woke up she noted that numbness has spread to the left side of her face and down the left side of her neck.  She is had no speech difficulties or focal weakness.  No visual disturbance." A CTA head/neck was performed, which was unremarkable. She was discharged with follow-up. Since then, she feels that she has mild residual numbness, but it has improved significantly. Denies recent URI.      She also reports sciatic nerve pain since this summer (July) associated with numbness in her left leg and right hip. Denies difficulty with ambulation. No saddle anesthesia or bowel/bladder incontinence/retention. No foot drop.     Work-up:    Imaging:  - CTA head and neck (07/15/2023): Unremarkable CTA head and neck.  No large vessel occlusion or high grade stenosis.      Labs:  Recent Labs   Lab 07/15/23  2053   LDL Cholesterol 118.6   HDL 51   Triglycerides 82   Cholesterol 186         Review of Systems: The following systems were reviewed with pertinent positives and negatives documented in the HPI: constitutional, eyes, CV, respiratory, GI, , musculoskeletal, skin, neurological, " psychiatric    Past Medical History:  Past Medical History:   Diagnosis Date    Anemia     Arthritis     Asthma     Colon polyps 04/2012    Elevated CPK     Encounter for blood transfusion     Hypertension     Kidney stones     Osteoporosis     Personal history of colonic polyps 01/24/2023    PUD (peptic ulcer disease)        Medications:    Current Outpatient Medications:     albuterol (PROVENTIL/VENTOLIN HFA) 90 mcg/actuation inhaler, 2 puffs 4 (four) times daily as needed., Disp: , Rfl:     ALPRAZolam (XANAX) 0.5 MG tablet, Take 0.5 mg by mouth 2 (two) times daily as needed., Disp: , Rfl:     aspirin 81 MG Chew, Take 81 mg by mouth once daily., Disp: , Rfl:     atorvastatin (LIPITOR) 20 MG tablet, TK 1 T PO  QHS, Disp: , Rfl:     budesonide-formoterol 160-4.5 mcg (SYMBICORT) 160-4.5 mcg/actuation HFAA, 2 puffs as needed., Disp: , Rfl:     cetirizine (ZYRTEC) 10 MG tablet, TAKE 1 TABLET BY MOUTH DAILY AS NEEDED FOR SINUS, Disp: , Rfl:     diclofenac (VOLTAREN) 50 MG EC tablet, Take 1 tablet (50 mg total) by mouth 3 (three) times daily., Disp: 15 tablet, Rfl: 0    fluticasone propionate (FLONASE) 50 mcg/actuation nasal spray, 2 sprays (100 mcg total) by Each Nostril route once daily., Disp: 18.2 mL, Rfl: 11    fluticasone propionate (FLOVENT HFA) 220 mcg/actuation inhaler, 2 puffs as needed., Disp: , Rfl:     gabapentin (NEURONTIN) 300 MG capsule, Take 300 mg by mouth nightly., Disp: , Rfl:     hydroCHLOROthiazide (MICROZIDE) 12.5 mg capsule, Take 12.5 mg by mouth once daily., Disp: , Rfl:     HYDROcodone-acetaminophen (NORCO) 7.5-325 mg per tablet, Take 1 tablet by mouth every 4 (four) hours as needed for Pain., Disp: 15 tablet, Rfl: 0    ibandronate (BONIVA) 150 mg tablet, Take 150 mg by mouth every 30 days., Disp: , Rfl:     inulin (FIBER GUMMIES ORAL), Take by mouth., Disp: , Rfl:     methocarbamoL (ROBAXIN) 500 MG Tab, Take 1 tablet (500 mg total) by mouth 3 (three) times daily as needed (Pain/muscle spasm).,  Disp: 15 tablet, Rfl: 0    montelukast (SINGULAIR) 10 mg tablet, Take 10 mg by mouth every evening., Disp: , Rfl:     nebulizer and compressor Domonique, as per administration instructions, Disp: , Rfl:     NIFEdipine (PROCARDIA-XL) 30 MG (OSM) 24 hr tablet, Take 30 mg by mouth once daily., Disp: , Rfl:     pantoprazole (PROTONIX) 40 MG tablet, Take 40 mg by mouth once daily., Disp: , Rfl:     paroxetine (PAXIL) 40 MG tablet, Take 40 mg by mouth once daily., Disp: , Rfl:     tiZANidine (ZANAFLEX) 4 MG tablet, Take by mouth every 8 (eight) hours as needed., Disp: , Rfl:   No current facility-administered medications for this visit.    Facility-Administered Medications Ordered in Other Visits:     0.9%  NaCl infusion, , Intravenous, Continuous, Lima Rojas MD    sodium chloride 0.9% flush 10 mL, 10 mL, Intravenous, PRN, Lima Rojas MD  Any other notable medications as documented in HPI.    Allergies:  Review of patient's allergies indicates:  No Known Allergies    Social History:  Social History     Socioeconomic History    Marital status:    Tobacco Use    Smoking status: Never    Smokeless tobacco: Never   Substance and Sexual Activity    Alcohol use: No     Comment: rarely    Drug use: No    Sexual activity: Yes     Partners: Male     Social Determinants of Health     Financial Resource Strain: Low Risk  (3/9/2022)    Overall Financial Resource Strain (CARDIA)     Difficulty of Paying Living Expenses: Not very hard   Food Insecurity: No Food Insecurity (3/9/2022)    Hunger Vital Sign     Worried About Running Out of Food in the Last Year: Never true     Ran Out of Food in the Last Year: Never true   Transportation Needs: No Transportation Needs (3/9/2022)    PRAPARE - Transportation     Lack of Transportation (Medical): No     Lack of Transportation (Non-Medical): No   Physical Activity: Inactive (3/9/2022)    Exercise Vital Sign     Days of Exercise per Week: 0 days     Minutes of Exercise per  Session: 0 min   Stress: No Stress Concern Present (3/9/2022)    [employer] [place] [employer] - Occupational Stress Questionnaire     Feeling of Stress : Only a little   Social Connections: Unknown (3/9/2022)    Social Connection and Isolation Panel [NHANES]     Frequency of Communication with Friends and Family: More than three times a week     Frequency of Social Gatherings with Friends and Family: More than three times a week     Attends Confucianism Services: Patient refused     Active Member of Clubs or Organizations: No     Attends Club or Organization Meetings: Never     Marital Status:    Housing Stability: Unknown (3/9/2022)    Housing Stability Vital Sign     Unable to Pay for Housing in the Last Year: No     Unstable Housing in the Last Year: No     Any other notable Social History as documented in HPI.    Family History:  Family History   Problem Relation Age of Onset    Hypertension Mother     Heart disease Mother     Hypertension Father     Stroke Father      Any other notable FMH as documented in HPI.      OBJECTIVE:     Physical Exam:   Vitals:    09/26/23 1412   BP: (!) 143/83   Pulse: (!) 57       GEN: NAD, pleasant, cooperative  CV: RRR  PULM: No signs of resp distress, on room air  EXT: No cyanosis, clubbing, or edema.    NEURO:  Mental status: The patient is alert, attentive, and oriented to self, age, month, year  Speech: Fluent speech with intact repetition, comprehension, and naming. Phonation is normal.    Cranial nerves:  CN II: Visual fields are full to confrontation. Pupils are 3mm and reactive to light OU.  CN III, IV, VI: EOMI, no nystagmus, no ptosis  CN V: Facial sensation is intact in all 3 divisions bilaterally.  CN VII: Face is symmetric with normal eye closure and smile.  CN VIII: Hearing is normal bilaterally.  CN IX, X: Palate elevates symmetrically.   CN XI: Head turning and shoulder shrug are intact.  CN XII: Tongue is midline with normal movements and no  atrophy.    Motor: Muscle bulk and tone are normal. No pronator drift.   Right Left   Right  Left   Deltoid 5/5 5/5  Hip Flexion 4+/5 4+/5   Biceps 5/5 5/5  Hip Extension 5/5 5/5   Triceps 5/5 5/5  Knee Flexion 5/5 5/5   Wrist Ext 5/5 5/5  Knee Extension 5/5 5/5   Finger Abd 5/5 5/5  Ankle dorsiflex 5/5 5/5       Ankle plantar flex 5/5 5/5     Reflexes: Reflexes are 2+ and symmetric at the biceps, triceps, knees, and ankles. Plantar responses are flexor.     Sensory: Decreased sensation to light touch LLE compared to the RLE.     Coordination: Rapid alternating movements and fine finger movements are intact. There is no dysmetria on finger-to-nose and heel-knee-shin. There are no abnormal or extraneous movements.    Gait/Stance: Posture is normal. Gait is steady with normal steps, base, arm swing, and turning.       ASSESSMENT/PLAN:     # Lumbar radiculopathy  - X-rays of L-spine were unremarkable. No concerning findings on her neurologic exam. Will obtain MRI L-spine and EMG/NCS to evaluate.  - In the meantime, recommend rest vs physical therapy as tolerated and medical therapy (NSAIDs, gabapentin). She may discuss referral to pain management as needed with her PCP.     # Left ear/facial numbness  - Unclear etiology at this time. Evaluation of the ear/auditory canal normal. Neurologic exam with subtle decreased sensation over left periauricular area. CTA head/neck obtained at the time of her symptoms showed no vascular abnormalities  - Plan for MRI brain with IAC w/ and w/o   - Follow-up: RTC in 3 months.     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

## 2023-09-28 ENCOUNTER — TELEPHONE (OUTPATIENT)
Dept: NEUROLOGY | Facility: CLINIC | Age: 58
End: 2023-09-28
Payer: MEDICAID

## 2023-09-28 DIAGNOSIS — I10 ESSENTIAL HYPERTENSION: Primary | ICD-10-CM

## 2023-09-28 DIAGNOSIS — R20.0 LEFT FACIAL NUMBNESS: ICD-10-CM

## 2023-09-28 NOTE — TELEPHONE ENCOUNTER
----- Message from Roberto Carlos Powers RT sent at 9/28/2023  2:47 PM CDT -----  Regarding: stat creatine  Hi, this patient needs STAT creatinine ordered and schedualed at least 24-48 before the date of their MRI on 9/29/23.    We do not have same day labs at the [place] location.     Please contact patient to make them aware of the changes. We can not perform this exam on this patient until lab results are received.     Patients 60 years and older or have risk factors must have labs within a 30 day window.     Thanks you!    Alessandra Jensen (MRI)(CT)(RT)(R)   Please call [phone]  (MRI) with any questions.

## 2023-09-28 NOTE — TELEPHONE ENCOUNTER
Called and spoke w pt. Explained that I have scheduled labs for her to get on 9/29 at 8am before her MRI at 9am. Pt confirmed date/time of labs.

## 2023-09-29 ENCOUNTER — HOSPITAL ENCOUNTER (OUTPATIENT)
Dept: RADIOLOGY | Facility: HOSPITAL | Age: 58
Discharge: HOME | End: 2023-09-29
Attending: STUDENT IN AN ORGANIZED HEALTH CARE EDUCATION/TRAINING PROGRAM
Payer: MEDICAID

## 2023-09-29 DIAGNOSIS — M54.9 DORSALGIA, UNSPECIFIED: ICD-10-CM

## 2023-09-29 DIAGNOSIS — H91.22 SUDDEN IDIOPATHIC HEARING LOSS OF LEFT EAR WITH UNRESTRICTED HEARING OF RIGHT EAR: ICD-10-CM

## 2023-09-29 PROCEDURE — 72148 MRI LUMBAR SPINE WITHOUT CONTRAST: ICD-10-PCS | Mod: 26,,, | Performed by: STUDENT IN AN ORGANIZED HEALTH CARE EDUCATION/TRAINING PROGRAM

## 2023-09-29 PROCEDURE — 70553 MRI BRAIN STEM W/O & W/DYE: CPT | Mod: 26,,, | Performed by: STUDENT IN AN ORGANIZED HEALTH CARE EDUCATION/TRAINING PROGRAM

## 2023-09-29 PROCEDURE — 72148 MRI LUMBAR SPINE W/O DYE: CPT | Mod: 26,,, | Performed by: STUDENT IN AN ORGANIZED HEALTH CARE EDUCATION/TRAINING PROGRAM

## 2023-09-29 PROCEDURE — 70553 MRI IAC/TEMPORAL BONES W W/O CONTRAST: ICD-10-PCS | Mod: 26,,, | Performed by: STUDENT IN AN ORGANIZED HEALTH CARE EDUCATION/TRAINING PROGRAM

## 2023-09-29 PROCEDURE — A9585 GADOBUTROL INJECTION: HCPCS | Mod: PN | Performed by: STUDENT IN AN ORGANIZED HEALTH CARE EDUCATION/TRAINING PROGRAM

## 2023-09-29 PROCEDURE — 70553 MRI BRAIN STEM W/O & W/DYE: CPT | Mod: TC,PN

## 2023-09-29 PROCEDURE — 25500020 PHARM REV CODE 255: Mod: PN | Performed by: STUDENT IN AN ORGANIZED HEALTH CARE EDUCATION/TRAINING PROGRAM

## 2023-09-29 PROCEDURE — 72148 MRI LUMBAR SPINE W/O DYE: CPT | Mod: TC,PN

## 2023-09-29 RX ORDER — GADOBUTROL 604.72 MG/ML
10 INJECTION INTRAVENOUS
Status: COMPLETED | OUTPATIENT
Start: 2023-09-29 | End: 2023-09-29

## 2023-09-29 RX ADMIN — GADOBUTROL 10 ML: 604.72 INJECTION INTRAVENOUS at 09:09

## 2023-10-03 ENCOUNTER — PROCEDURE VISIT (OUTPATIENT)
Dept: NEUROLOGY | Facility: CLINIC | Age: 58
End: 2023-10-03
Payer: MEDICAID

## 2023-10-03 DIAGNOSIS — M54.9 DORSALGIA, UNSPECIFIED: ICD-10-CM

## 2023-10-03 PROCEDURE — 95886 MUSC TEST DONE W/N TEST COMP: CPT | Mod: 26,S$PBB,, | Performed by: PSYCHIATRY & NEUROLOGY

## 2023-10-03 PROCEDURE — 95910 NRV CNDJ TEST 7-8 STUDIES: CPT | Mod: PBBFAC | Performed by: PSYCHIATRY & NEUROLOGY

## 2023-10-03 PROCEDURE — 95910 PR NERVE CONDUCTION STUDY; 7-8 STUDIES: ICD-10-PCS | Mod: 26,S$PBB,, | Performed by: PSYCHIATRY & NEUROLOGY

## 2023-10-03 PROCEDURE — 95886 MUSC TEST DONE W/N TEST COMP: CPT | Mod: PBBFAC | Performed by: PSYCHIATRY & NEUROLOGY

## 2023-10-03 PROCEDURE — 95910 NRV CNDJ TEST 7-8 STUDIES: CPT | Mod: 26,S$PBB,, | Performed by: PSYCHIATRY & NEUROLOGY

## 2023-10-03 PROCEDURE — 95886 PR EMG COMPLETE, W/ NERVE CONDUCTION STUDIES, 5+ MUSCLES: ICD-10-PCS | Mod: 26,S$PBB,, | Performed by: PSYCHIATRY & NEUROLOGY

## 2023-10-24 ENCOUNTER — TELEPHONE (OUTPATIENT)
Dept: ORTHOPEDICS | Facility: CLINIC | Age: 58
End: 2023-10-24
Payer: MEDICAID

## 2023-10-24 NOTE — TELEPHONE ENCOUNTER
Spoke with patient.  Stated [facility] canceled her back appt three times. Gave her number to . Verbalized understanding.

## 2023-10-24 NOTE — TELEPHONE ENCOUNTER
----- Message from Fay Schmidt sent at 10/24/2023  8:45 AM CDT -----  Type:  Patient Requesting Referral    Who Called:pt   Does the patient already have the specialty appointment scheduled?:no  If yes, what is the date of that appointment?:n/a  Referral to What Specialty:orthopedic  Reason for Referral:Sciatic back pain   Does the patient want the referral with a specific physician?:no  Is the specialist an [facility]?:n/a  Patient Requesting a Response?:yes  Would the patient rather a call back or a response via [facility]?  call  Best Call Back Number:[phone]  Additional Information:

## 2023-10-27 ENCOUNTER — PATIENT MESSAGE (OUTPATIENT)
Dept: NEUROLOGY | Facility: CLINIC | Age: 58
End: 2023-10-27
Payer: MEDICAID

## 2023-12-21 NOTE — ANESTHESIA PREPROCEDURE EVALUATION
01/11/2017  Raquel Finch is a 52 y.o., female.    Pre-op Assessment      I have reviewed the Medications.     Review of Systems  Anesthesia Hx:  No problems with previous Anesthesia  Denies Family Hx of Anesthesia complications.    Cardiovascular:   Hypertension    Pulmonary:   Asthma mild        Physical Exam  General:  Well nourished, Obesity    Airway/Jaw/Neck:  Airway Findings: Mouth Opening: Normal Tongue: Normal  General Airway Assessment: Adult  Mallampati: II      Dental:  Dental Findings: In tact    Chest/Lungs:  Chest/Lungs Findings: Clear to auscultation, Normal Respiratory Rate     Heart/Vascular:  Heart Findings: Rate: Normal  Rhythm: Regular Rhythm        Mental Status:  Mental Status Findings:  Cooperative, Alert and Oriented         Anesthesia Plan  Type of Anesthesia, risks & benefits discussed:  Anesthesia Type:  MAC  Patient's Preference: MAC  Intra-op Monitoring Plan:   Intra-op Monitoring Plan Comments:   Post Op Pain Control Plan:   Post Op Pain Control Plan Comments:   Induction:   IV  Beta Blocker:         Informed Consent: Patient understands risks and agrees with Anesthesia plan.  Questions answered. Anesthesia consent signed with patient.  ASA Score: 2     Day of Surgery Review of History & Physical:            Ready For Surgery From Anesthesia Perspective.        "Jasper Best (86 y.o. Male)       Date of Birth   1937    Social Security Number       Address   [address] [place] [address]    Home Phone   [phone]    MRN   [id number]       Judaism   None    Marital Status                               Admission Date   12/17/23    Admission Type   Emergency    Admitting Provider   Brannon Holder MD    Attending Provider   Manish Garcia MD    Department, Room/Bed   [address], [address]       Discharge Date       Discharge Disposition       Discharge Destination                                 Attending Provider: Manish Garcia MD    Allergies: No Known Allergies    Isolation: Contact   Infection: Other (12/19/23)   Code Status: CPR    Ht: 177.8 cm (70\")   Wt: 73.3 kg (161 lb 8.1 oz)    Admission Cmt: None   Principal Problem: Diarrhea [R19.7]                   Active Insurance as of 12/17/2023       Primary Coverage       Payor Plan Insurance Group Employer/Plan Group    MEDICARE MEDICARE A & B        Payor Plan Address Payor Plan Phone Number Payor Plan Fax Number Effective Dates    [address] [phone]  6/1/2003 - None Entered    [facility] [id number]         Subscriber Name Subscriber Birth Date Member ID       JASPER BEST 1937 [id number]               Secondary Coverage       Payor Plan Insurance Group Employer/Plan Group    [employer]                  Payor Plan Address Payor Plan Phone Number Payor Plan Fax Number Effective Dates    [address]   4/1/2018 - None Entered    [place] [id number]         Subscriber Name Subscriber Birth Date Member ID       JASPER BEST 1937 [id number]                     Emergency Contacts        (Rel.) Home Phone Work Phone Mobile Phone    Meli Best (POA) (Daughter) [phone] -- [phone]                "

## 2024-06-12 ENCOUNTER — TELEPHONE (OUTPATIENT)
Dept: ORTHOPEDICS | Facility: CLINIC | Age: 59
End: 2024-06-12
Payer: MEDICAID

## 2024-06-12 NOTE — TELEPHONE ENCOUNTER
----- Message from Jo Hair sent at 6/12/2024  3:34 PM CDT -----  Type:  Patient Requesting Referral    Who Called:pt   Does the patient already have the specialty appointment scheduled?:No   If yes, what is the date of that appointment?:  Referral to What Specialty:  Reason for Referral:Sciatic Nerve pain   Does the patient want the referral with a specific physician?:No   Is the specialist [facility] or Any facility   Patient Requesting a Response?:yes   Would the patient rather a call back or a response via [facility]? Call   Best Call Back Number:[phone]  Additional Information:

## 2024-06-12 NOTE — TELEPHONE ENCOUNTER
Spoke with patient.  Informed her that we do not see sciatic pain here. Referred her to [facility]. Verbalized understanding.

## 2024-08-28 ENCOUNTER — TELEPHONE (OUTPATIENT)
Dept: ORTHOPEDICS | Facility: CLINIC | Age: 59
End: 2024-08-28
Payer: MEDICAID

## 2024-08-28 NOTE — TELEPHONE ENCOUNTER
Returned pt callback to schedule appointment for right shoulder. Explained to pt that I will have to schedule her at [facility] due to insurance . Offered pt the first available with [facility] on 11/12. Pt agreed and gave verbal understanding.call ended ----- Message from Jone Costa sent at 8/28/2024  3:19 PM CDT -----  Type:  Sooner Apoointment Request    Caller is requesting a sooner appointment.   Name of Caller:pt   When is the first available appointment?10/17  Symptoms:right shoulder pain  Would the patient rather a call back or a response via [facility]? call  Best Call Back Number: [phone]  Additional Information:

## 2024-12-25 ENCOUNTER — HOSPITAL ENCOUNTER (EMERGENCY)
Facility: HOSPITAL | Age: 59
Discharge: HOME | End: 2024-12-25
Attending: EMERGENCY MEDICINE
Payer: MEDICAID

## 2024-12-25 VITALS
RESPIRATION RATE: 13 BRPM | HEART RATE: 58 BPM | SYSTOLIC BLOOD PRESSURE: 135 MMHG | BODY MASS INDEX: 34.21 KG/M2 | DIASTOLIC BLOOD PRESSURE: 64 MMHG | TEMPERATURE: 98 F | OXYGEN SATURATION: 95 % | WEIGHT: 225 LBS

## 2024-12-25 DIAGNOSIS — M79.604 RIGHT LEG PAIN: ICD-10-CM

## 2024-12-25 DIAGNOSIS — R55 SYNCOPE, UNSPECIFIED SYNCOPE TYPE: Primary | ICD-10-CM

## 2024-12-25 DIAGNOSIS — R29.818 TRANSIENT NEUROLOGIC DEFICIT: ICD-10-CM

## 2024-12-25 PROBLEM — R53.1 RIGHT SIDED WEAKNESS: Status: ACTIVE | Noted: 2024-12-25

## 2024-12-25 PROBLEM — R20.0 RIGHT SIDED NUMBNESS: Status: ACTIVE | Noted: 2024-12-25

## 2024-12-25 LAB
ALBUMIN SERPL BCP-MCNC: 3.4 G/DL (ref 3.5–5.2)
ALP SERPL-CCNC: 78 U/L (ref 40–150)
ALT SERPL W/O P-5'-P-CCNC: 10 U/L (ref 10–44)
ANION GAP SERPL CALC-SCNC: 9 MMOL/L (ref 8–16)
AST SERPL-CCNC: 18 U/L (ref 10–40)
BACTERIA #/AREA URNS AUTO: ABNORMAL /HPF
BASOPHILS # BLD AUTO: 0.08 K/UL (ref 0–0.2)
BASOPHILS NFR BLD: 1.3 % (ref 0–1.9)
BILIRUB SERPL-MCNC: 0.9 MG/DL (ref 0.1–1)
BILIRUB UR QL STRIP: NEGATIVE
BUN SERPL-MCNC: 16 MG/DL (ref 6–20)
CALCIUM SERPL-MCNC: 8.8 MG/DL (ref 8.7–10.5)
CHLORIDE SERPL-SCNC: 110 MMOL/L (ref 95–110)
CHOLEST SERPL-MCNC: 171 MG/DL (ref 120–199)
CHOLEST/HDLC SERPL: 3.7 {RATIO} (ref 2–5)
CLARITY UR REFRACT.AUTO: CLEAR
CO2 SERPL-SCNC: 23 MMOL/L (ref 23–29)
COLOR UR AUTO: YELLOW
CREAT SERPL-MCNC: 0.9 MG/DL (ref 0.5–1.4)
DIFFERENTIAL METHOD BLD: ABNORMAL
EOSINOPHIL # BLD AUTO: 0.2 K/UL (ref 0–0.5)
EOSINOPHIL NFR BLD: 2.5 % (ref 0–8)
ERYTHROCYTE [DISTWIDTH] IN BLOOD BY AUTOMATED COUNT: 18.7 % (ref 11.5–14.5)
EST. GFR  (NO RACE VARIABLE): >60 ML/MIN/1.73 M^2
GLUCOSE SERPL-MCNC: 78 MG/DL (ref 70–110)
GLUCOSE UR QL STRIP: NEGATIVE
HCT VFR BLD AUTO: 45.8 % (ref 37–48.5)
HCV AB SERPL QL IA: NORMAL
HDLC SERPL-MCNC: 46 MG/DL (ref 40–75)
HDLC SERPL: 26.9 % (ref 20–50)
HGB BLD-MCNC: 13.7 G/DL (ref 12–16)
HGB UR QL STRIP: ABNORMAL
HIV 1+2 AB+HIV1 P24 AG SERPL QL IA: NORMAL
HYALINE CASTS UR QL AUTO: 0 /LPF
IMM GRANULOCYTES # BLD AUTO: 0.01 K/UL (ref 0–0.04)
IMM GRANULOCYTES NFR BLD AUTO: 0.2 % (ref 0–0.5)
INFLUENZA A, MOLECULAR: NOT DETECTED
INFLUENZA B, MOLECULAR: NOT DETECTED
INR PPP: 1 (ref 0.8–1.2)
KETONES UR QL STRIP: ABNORMAL
LDLC SERPL CALC-MCNC: 112.2 MG/DL (ref 63–159)
LEUKOCYTE ESTERASE UR QL STRIP: NEGATIVE
LYMPHOCYTES # BLD AUTO: 1.7 K/UL (ref 1–4.8)
LYMPHOCYTES NFR BLD: 28 % (ref 18–48)
MAGNESIUM SERPL-MCNC: 2.2 MG/DL (ref 1.6–2.6)
MCH RBC QN AUTO: 22.9 PG (ref 27–31)
MCHC RBC AUTO-ENTMCNC: 29.9 G/DL (ref 32–36)
MCV RBC AUTO: 77 FL (ref 82–98)
MICROSCOPIC COMMENT: ABNORMAL
MONOCYTES # BLD AUTO: 0.3 K/UL (ref 0.3–1)
MONOCYTES NFR BLD: 5 % (ref 4–15)
NEUTROPHILS # BLD AUTO: 3.8 K/UL (ref 1.8–7.7)
NEUTROPHILS NFR BLD: 63 % (ref 38–73)
NITRITE UR QL STRIP: NEGATIVE
NONHDLC SERPL-MCNC: 125 MG/DL
NRBC BLD-RTO: 0 /100 WBC
PH UR STRIP: 6 [PH] (ref 5–8)
PLATELET # BLD AUTO: 323 K/UL (ref 150–450)
PMV BLD AUTO: 10.2 FL (ref 9.2–12.9)
POCT GLUCOSE: 81 MG/DL (ref 70–110)
POTASSIUM SERPL-SCNC: 3.9 MMOL/L (ref 3.5–5.1)
PROT SERPL-MCNC: 7 G/DL (ref 6–8.4)
PROT UR QL STRIP: ABNORMAL
PROTHROMBIN TIME: 11 SEC (ref 9–12.5)
RBC # BLD AUTO: 5.99 M/UL (ref 4–5.4)
RBC #/AREA URNS AUTO: 5 /HPF (ref 0–4)
RSV AG BY MOLECULAR METHOD: NOT DETECTED
SARS-COV-2 RNA RESP QL NAA+PROBE: NOT DETECTED
SODIUM SERPL-SCNC: 142 MMOL/L (ref 136–145)
SP GR UR STRIP: >1.03 (ref 1–1.03)
SQUAMOUS #/AREA URNS AUTO: 1 /HPF
TRIGL SERPL-MCNC: 64 MG/DL (ref 30–150)
TROPONIN I SERPL DL<=0.01 NG/ML-MCNC: <3 NG/L (ref 0–14)
TSH SERPL DL<=0.005 MIU/L-ACNC: 1.22 UIU/ML (ref 0.4–4)
URN SPEC COLLECT METH UR: ABNORMAL
WBC # BLD AUTO: 5.99 K/UL (ref 3.9–12.7)
WBC #/AREA URNS AUTO: 0 /HPF (ref 0–5)

## 2024-12-25 PROCEDURE — 83735 ASSAY OF MAGNESIUM: CPT | Performed by: EMERGENCY MEDICINE

## 2024-12-25 PROCEDURE — 96374 THER/PROPH/DIAG INJ IV PUSH: CPT

## 2024-12-25 PROCEDURE — 84443 ASSAY THYROID STIM HORMONE: CPT | Performed by: EMERGENCY MEDICINE

## 2024-12-25 PROCEDURE — 84484 ASSAY OF TROPONIN QUANT: CPT | Performed by: EMERGENCY MEDICINE

## 2024-12-25 PROCEDURE — 80061 LIPID PANEL: CPT | Performed by: EMERGENCY MEDICINE

## 2024-12-25 PROCEDURE — 85025 COMPLETE CBC W/AUTO DIFF WBC: CPT | Performed by: EMERGENCY MEDICINE

## 2024-12-25 PROCEDURE — 99284 EMERGENCY DEPT VISIT MOD MDM: CPT | Mod: FS,,, | Performed by: STUDENT IN AN ORGANIZED HEALTH CARE EDUCATION/TRAINING PROGRAM

## 2024-12-25 PROCEDURE — 94761 N-INVAS EAR/PLS OXIMETRY MLT: CPT

## 2024-12-25 PROCEDURE — 80053 COMPREHEN METABOLIC PANEL: CPT | Performed by: EMERGENCY MEDICINE

## 2024-12-25 PROCEDURE — 63600175 PHARM REV CODE 636 W HCPCS: Performed by: EMERGENCY MEDICINE

## 2024-12-25 PROCEDURE — 86803 HEPATITIS C AB TEST: CPT | Performed by: EMERGENCY MEDICINE

## 2024-12-25 PROCEDURE — 82962 GLUCOSE BLOOD TEST: CPT

## 2024-12-25 PROCEDURE — 99285 EMERGENCY DEPT VISIT HI MDM: CPT | Mod: 25

## 2024-12-25 PROCEDURE — 25500020 PHARM REV CODE 255: Performed by: EMERGENCY MEDICINE

## 2024-12-25 PROCEDURE — 81001 URINALYSIS AUTO W/SCOPE: CPT | Performed by: EMERGENCY MEDICINE

## 2024-12-25 PROCEDURE — 0241U SARS-COV2 (COVID) WITH FLU/RSV BY PCR: CPT | Performed by: EMERGENCY MEDICINE

## 2024-12-25 PROCEDURE — 93005 ELECTROCARDIOGRAM TRACING: CPT

## 2024-12-25 PROCEDURE — 85610 PROTHROMBIN TIME: CPT | Performed by: EMERGENCY MEDICINE

## 2024-12-25 PROCEDURE — 87389 HIV-1 AG W/HIV-1&-2 AB AG IA: CPT | Performed by: EMERGENCY MEDICINE

## 2024-12-25 PROCEDURE — 93010 ELECTROCARDIOGRAM REPORT: CPT | Mod: ,,, | Performed by: INTERNAL MEDICINE

## 2024-12-25 RX ORDER — LORAZEPAM 2 MG/ML
0.5 INJECTION INTRAMUSCULAR
Status: COMPLETED | OUTPATIENT
Start: 2024-12-25 | End: 2024-12-25

## 2024-12-25 RX ADMIN — LORAZEPAM 0.5 MG: 2 INJECTION INTRAMUSCULAR; INTRAVENOUS at 01:12

## 2024-12-25 RX ADMIN — IOHEXOL 100 ML: 350 INJECTION, SOLUTION INTRAVENOUS at 11:12

## 2024-12-25 NOTE — ED TRIAGE NOTES
"Raquel Finch, a 59 y.o. female presents to the ED via EMS w/ complaint of syncope.  reports that patient "passed out after fussing at the grandchildren". Patient has been having right side numbness and weakness x2 days. Patient whispering when speaking, states she is unsure why she is doing it. Patient AAOx4. Denies SOB or chest pain.     Triage note:  Chief Complaint   Patient presents with    Extremity Weakness     Pt arrives via EMS, syncopal episode about an hour ago with right sided numbness for 2 days, new onset right side weakness and aphasia starting about 1030am     Review of patient's allergies indicates:  No Known Allergies  Past Medical History:   Diagnosis Date    Anemia     Arthritis     Asthma     Colon polyps 04/2012    Elevated CPK     Encounter for blood transfusion     Hypertension     Kidney stones     Osteoporosis     Personal history of colonic polyps 01/24/2023    PUD (peptic ulcer disease)       "

## 2024-12-25 NOTE — HPI
Raquel Finch is a 59 y.o. female with a significant medical history of HTN, asthma, GERD, partial rotator cuff tear (right) who presents to the hospital for evaluation of RSW and numbness. The patient states she started having R sided paresthesias over the past week with some weakness 2 days ago. Today she was speaking with her  when she had a syncopal event. After, the patient was noted to have RSW. EMS was activated. The patient was brought to the ED for evaluation, a Stroke Code was activated.

## 2024-12-25 NOTE — ED PROVIDER NOTES
Encounter Date: 12/25/2024       History     Chief Complaint   Patient presents with    Extremity Weakness     Pt arrives via EMS, syncopal episode about an hour ago with right sided numbness for 2 days, new onset right side weakness and aphasia starting about 1030am     58 yo W with pmhx PUD, HTN, anemia, asthma, nephrolithiasis, osteopenia, arthritis, headache, chronic right shoulder pain presents via EMS with a chief complaint of syncope and now some neuro deficits.  EMS was called to the patient's house for syncopal episode that was witnessed at 10:00 a.m..  Upon EMS arrival, patient was lying on her left side.  She was noted to have right-sided drift.  Patient noted she has been suffering from paresthesias in the right upper and lower extremity for the past 2 days.  On EMS arrival she had right upper extremity and right lower extremity weakness as well as drift.  They noted aphasia but she is speaking to me albeit slowly.    The history is provided by the patient and the EMS personnel.     Review of patient's allergies indicates:  No Known Allergies  Past Medical History:   Diagnosis Date    Anemia     Arthritis     Asthma     Colon polyps 04/2012    Elevated CPK     Encounter for blood transfusion     Hypertension     Kidney stones     Osteoporosis     Personal history of colonic polyps 01/24/2023    PUD (peptic ulcer disease)      Past Surgical History:   Procedure Laterality Date    COLONOSCOPY N/A 12/13/2017    Procedure: COLONOSCOPY;  Surgeon: Laxmi Crow MD;  Location: [facility];  Service: Endoscopy;  Laterality: N/A;    COLONOSCOPY N/A 1/24/2023    Procedure: COLONOSCOPY;  Surgeon: Lima Rojas MD;  Location: [facility];  Service: Endoscopy;  Laterality: N/A;    COLONOSCOPY W/ POLYPECTOMY      COLONOSCOPY W/ POLYPECTOMY      CYSTOSCOPY W/ STONE MANIPULATION Right 03/02/2017    DILATION AND CURETTAGE OF UTERUS      ESOPHAGOGASTRODUODENOSCOPY      ESOPHAGOGASTRODUODENOSCOPY       ESOPHAGOGASTRODUODENOSCOPY N/A 5/25/2023    Procedure: EGD (ESOPHAGOGASTRODUODENOSCOPY);  Surgeon: Lima Rojas MD;  Location: [facility];  Service: Endoscopy;  Laterality: N/A;    TUBAL LIGATION       Family History   Problem Relation Name Age of Onset    Hypertension Mother      Heart disease Mother      Hypertension Father      Stroke Father       Social History     Tobacco Use    Smoking status: Never    Smokeless tobacco: Never   Substance Use Topics    Alcohol use: No     Comment: rarely    Drug use: No     Review of Systems    Physical Exam     Initial Vitals [12/25/24 1121]   BP Pulse Resp Temp SpO2   (!) 174/88 60 16 98.1 °F (36.7 °C) 98 %      MAP       --         Physical Exam    Nursing note and vitals reviewed.  Constitutional: She appears well-developed and well-nourished. She is not diaphoretic.   Fatigued   HENT:   Head: Normocephalic and atraumatic.   Eyes: Right eye exhibits no discharge. Left eye exhibits no discharge. No scleral icterus.   Neck: Neck supple. No JVD present.   Normal range of motion.  Cardiovascular:  Normal rate, regular rhythm and normal heart sounds.     Exam reveals no gallop and no friction rub.       No murmur heard.  Pulmonary/Chest: Breath sounds normal. No respiratory distress. She has no wheezes. She has no rhonchi. She has no rales.   Abdominal: Abdomen is soft. She exhibits no distension and no mass. There is no abdominal tenderness. There is no rebound and no guarding.   Musculoskeletal:         General: No tenderness or edema. Normal range of motion.      Cervical back: Normal range of motion and neck supple.     Neurological: She is oriented to person, place, and time.   Awake and oriented but slowed and limited verbal responses  There is 5- out of 5 strength in the right upper extremity and right lower extremity when compared to the contralateral side  There is mild right upper extremity pronator drift   Skin: Skin is warm and dry. Capillary refill takes less  than 2 seconds.   Psychiatric:   Delayed speech         ED Course   Procedures  Labs Reviewed   CBC W/ AUTO DIFFERENTIAL - Abnormal       Result Value    WBC 5.99      RBC 5.99 (*)     Hemoglobin 13.7      Hematocrit 45.8      MCV 77 (*)     MCH 22.9 (*)     MCHC 29.9 (*)     RDW 18.7 (*)     Platelets 323      MPV 10.2      Immature Granulocytes 0.2      Gran # (ANC) 3.8      Immature Grans (Abs) 0.01      Lymph # 1.7      Mono # 0.3      Eos # 0.2      Baso # 0.08      nRBC 0      Gran % 63.0      Lymph % 28.0      Mono % 5.0      Eosinophil % 2.5      Basophil % 1.3      Differential Method Automated      Narrative:     Release to patient->Immediate   URINALYSIS, REFLEX TO URINE CULTURE - Abnormal    Specimen UA Urine, Clean Catch      Color, UA Yellow      Appearance, UA Clear      pH, UA 6.0      Specific Gravity, UA >1.030 (*)     Protein, UA 1+ (*)     Glucose, UA Negative      Ketones, UA 1+ (*)     Bilirubin (UA) Negative      Occult Blood UA Trace (*)     Nitrite, UA Negative      Leukocytes, UA Negative      Narrative:     Specimen Source->Urine   COMPREHENSIVE METABOLIC PANEL - Abnormal    Sodium 142      Potassium 3.9      Chloride 110      CO2 23      Glucose 78      BUN 16      Creatinine 0.9      Calcium 8.8      Total Protein 7.0      Albumin 3.4 (*)     Total Bilirubin 0.9      Alkaline Phosphatase 78      AST 18      ALT 10      eGFR >60.0      Anion Gap 9     URINALYSIS MICROSCOPIC - Abnormal    RBC, UA 5 (*)     WBC, UA 0      Bacteria Few (*)     Squam Epithel, UA 1      Hyaline Casts, UA 0      Microscopic Comment SEE COMMENT      Narrative:     Specimen Source->Urine   PROTIME-INR    Prothrombin Time 11.0      INR 1.0      Narrative:     Release to patient->Immediate   HEPATITIS C ANTIBODY    Hepatitis C Ab Non-reactive      Narrative:     Release to patient->Immediate   HIV 1 / 2 ANTIBODY    HIV 1/2 Ag/Ab Non-reactive      Narrative:     Release to patient->Immediate   TROPONIN I HIGH  SENSITIVITY    Troponin I High Sensitivity <3      Narrative:     Release to patient->Immediate   SARS-COV2 (COVID) WITH FLU/RSV BY PCR    SARS-CoV2 (COVID-19) Qualitative PCR Not Detected      Influenza A, Molecular Not Detected      Influenza B, Molecular Not Detected      RSV Ag by Molecular Method Not Detected     LIPID PANEL    Cholesterol 171      Triglycerides 64      HDL 46      LDL Cholesterol 112.2      HDL/Cholesterol Ratio 26.9      Total Cholesterol/HDL Ratio 3.7      Non-HDL Cholesterol 125     TSH    TSH 1.222     MAGNESIUM    Magnesium 2.2     POCT GLUCOSE    POCT Glucose 81       EKG Readings: (Independently Interpreted)   Initial Reading: No STEMI. Rhythm: Normal Sinus Rhythm. Heart Rate: 62. ST Segments: Normal ST Segments. T Waves: Normal. Axis: Left Axis Deviation.       Imaging Results              US Lower Extremity Veins Right (Final result)  Result time 12/25/24 17:40:16      Final result by Thom Schneider MD (12/25/24 17:40:16)                   Impression:      No evidence of deep venous thrombosis in the right lower extremity.      Electronically signed by: Thom Schneider MD  Date:    12/25/2024  Time:    17:40               Narrative:    EXAMINATION:  US LOWER EXTREMITY VEINS RIGHT    CLINICAL HISTORY:  Pain in right leg    TECHNIQUE:  Duplex and color flow Doppler evaluation and graded compression of the right lower extremity veins was performed.    COMPARISON:  None    FINDINGS:  Duplex and color flow Doppler evaluation does not reveal any evidence of acute venous thrombosis in the common femoral, superficial femoral, greater saphenous, popliteal, peroneal, anterior tibial and posterior tibial veins of the right lower extremity.  There is no reflux to suggest valvular incompetence.                                       CTA STROKE MULTI-PHASE (Final result)  Result time 12/25/24 12:25:59      Final result by Chidi Frost MD (12/25/24 12:25:59)                   Impression:       No acute intracranial process.    No significant stenosis at the carotid bifurcations bilaterally by NASCET criteria.    No significant stenosis of the vertebral arteries.    No major branch stenosis/occlusion at the [id number].  Mild to moderate atherosclerotic narrowing of the cavernous/supraclinoid right ICA.      Electronically signed by: Chidi Frost  Date:    12/25/2024  Time:    12:25               Narrative:    EXAMINATION:  CTA STROKE MULTI-PHASE    CLINICAL HISTORY:  Neuro deficit, acute, stroke suspected;    TECHNIQUE:  Non contrast low dose axial images were obtained thought the head. CT angiogram was performed from the level of the anil to the top of the head following the IV administration of 100mL of Omnipaque 350.   Sagittal and coronal reconstructions and maximum intensity projection reconstructions were performed. Arterial stenosis percentages are based on NASCET measurement criteria.  Two additional phases of immediate post-contrast CTA images were performed through the head alone.    3D reformats were created on an independent workstation to evaluate the [id number].    COMPARISON:  CTA 07/15/2023, MRI 09/09/2023    FINDINGS:  Brain:    There is no evidence of hydrocephalus, mass effect, intracranial hemorrhage, or acute territorial infarct.    CTA:    There is no advanced stenosis at the origins of the vessels from the aortic arch.    No advanced stenosis at the origin of the vertebral arteries. There is no advanced stenosis of the vertebral arteries in the neck.    There is no significant stenosis at the carotid bifurcations by NASCET criteria.    Intracranially there is no major branch advanced stenosis/occlusion at the [place].  There is mild to moderate narrowing of the cavernous/petrous ICA on the right with atherosclerotic calcification.    No aneurysm is identified. The venous sinuses are patent.    No soft tissue mass is identified in the neck.    These  "findings were communicated to Dr. Contreras at 12:14 on 12/25/2024                                       Medications   iohexoL (OMNIPAQUE 350) injection 100 mL (100 mLs Intravenous Given 12/25/24 1158)   LORazepam injection 0.5 mg (0.5 mg Intravenous Given 12/25/24 1318)     Medical Decision Making  60 yo W with pmhx PUD, HTN, anemia, asthma, nephrolithiasis, osteopenia, arthritis, headache, chronic right shoulder pain presents via EMS with a chief complaint of syncope and now some neuro deficits.     Differential includes, but not limited to: Dysrhythmia, electrolyte abnormalities, syncope, ACS, CVA, ICH, chronic pain, infectious issues    Given new neuro changes with last known normal of 10:00 a.m., will activate an acute stroke code.  However, will pursue other etiologies as well.  Will obtain CTA multiphase, labs, imaging.  Vascular neurology consulted.    Reassessment: Patient was evaluated by vascular neurology.  She told the vascular neurology AP P that weakness has actually been ongoing for the past 2 days.  This is different than what the patient told me but at the advice of vascular neurology, the acute stroke code was canceled.  Suspect that this was more subacute.    Reassessment: CTA negative for any acute abnormality.  I spoke with the patient again along with the  and their friend.  They note that patient was stressed out, "fussing" at the grandchildren when the syncopal episode occurred.  Patient denies any pain but continues to be moaning and trembling.  They suspect she is anxious.  Patient does admit to that.  Will administer 1 mg Ativan IV.    Reassessment: CBC without leukocytosis or anemia.  Troponin undetectable.  Influenza and COVID and RSV were negative.  CMP normal.  Triglycerides normal.  Troponin undetectable.  Patient now alert with very fluent speech, and neuro intact.  Drift has resolved.  Strength has resolved.  Patient notes that the last thing she remembers will "fussing" at " her granddaughter and did not remember anything that happened next.  Given the episode of syncope occurred during a time of high stress, along with transient neurologic symptoms that resolved with Ativan, lower suspicion for CVA at this time.  Patient notes she has had evaluations for CVA in the past and actually followed up with Neurology a few years ago.  She notes that she has a previous rotator cuff injury to the right upper extremity but no worse now than usual.  No pain currently.  No weakness or numbness.  She does report some pain in the right lower extremity.  Denies weakness or numbness.  She notes she is on a 21 hour drive in his concerned she has a blood clot in that leg.  On exam there is no palpable asymmetry or edema between the 2 but will obtain ultrasound to evaluate.     Reassessment: Ultrasound with elevated specific gravity, 1+ protein, 1+ ketones but negative for infection. US negative for DVT.  On repeat assessment, vitals are normal and within normal limits.  Neuro exam normal.  I explained that no emergent etiology of her symptoms was found today.  She is comfortable with discharge and follow-up.    Amount and/or Complexity of Data Reviewed  Labs: ordered.  Radiology: ordered.    Risk  Prescription drug management.                                      Clinical Impression:  Final diagnoses:  [M79.604] Right leg pain  [R55] Syncope, unspecified syncope type (Primary)  [R29.818] Transient neurologic deficit          ED Disposition Condition    Discharge Stable          ED Prescriptions    None       Follow-up Information       Follow up With Specialties Details Why Contact Info    Wilmer Rivera MD Internal Medicine Schedule an appointment as soon as possible for a visit   [address] [address] [phone]      [facility] Emergency Medicine  As needed, If symptoms worsen [address]  [facility] [address] [phone]              Asim Contreras MD  12/25/24 1812

## 2024-12-25 NOTE — SUBJECTIVE & OBJECTIVE
Past Medical History:   Diagnosis Date    Anemia     Arthritis     Asthma     Colon polyps 04/2012    Elevated CPK     Encounter for blood transfusion     Hypertension     Kidney stones     Osteoporosis     Personal history of colonic polyps 01/24/2023    PUD (peptic ulcer disease)      Past Surgical History:   Procedure Laterality Date    COLONOSCOPY N/A 12/13/2017    Procedure: COLONOSCOPY;  Surgeon: Laxmi Crow MD;  Location: [facility];  Service: Endoscopy;  Laterality: N/A;    COLONOSCOPY N/A 1/24/2023    Procedure: COLONOSCOPY;  Surgeon: Lima Rojas MD;  Location: [facility];  Service: Endoscopy;  Laterality: N/A;    COLONOSCOPY W/ POLYPECTOMY      COLONOSCOPY W/ POLYPECTOMY      CYSTOSCOPY W/ STONE MANIPULATION Right 03/02/2017    DILATION AND CURETTAGE OF UTERUS      ESOPHAGOGASTRODUODENOSCOPY      ESOPHAGOGASTRODUODENOSCOPY      ESOPHAGOGASTRODUODENOSCOPY N/A 5/25/2023    Procedure: EGD (ESOPHAGOGASTRODUODENOSCOPY);  Surgeon: Lima Rojas MD;  Location: [facility];  Service: Endoscopy;  Laterality: N/A;    TUBAL LIGATION       Social History     Tobacco Use    Smoking status: Never    Smokeless tobacco: Never   Substance Use Topics    Alcohol use: No     Comment: rarely    Drug use: No     Review of patient's allergies indicates:  No Known Allergies    Medications: I have reviewed the current medication administration record.    Current Outpatient Medications   Medication Instructions    albuterol (PROVENTIL/VENTOLIN HFA) 90 mcg/actuation inhaler 2 puffs, 4 times daily PRN    ALPRAZolam (XANAX) 0.5 mg, Oral, 2 times daily PRN    aspirin 81 mg, Oral, Daily    atorvastatin (LIPITOR) 20 MG tablet TK 1 T PO  QHS    budesonide-formoterol 160-4.5 mcg (SYMBICORT) 160-4.5 mcg/actuation HFAA 2 puffs, As needed (PRN)    cetirizine (ZYRTEC) 10 MG tablet TAKE 1 TABLET BY MOUTH DAILY AS NEEDED FOR SINUS    fluticasone propionate (FLONASE) 100 mcg, Each Nostril, Daily    fluticasone propionate  (FLOVENT HFA) 220 mcg/actuation inhaler 2 puffs, As needed (PRN)    gabapentin (NEURONTIN) 300 mg, Oral, Nightly    hydroCHLOROthiazide (MICROZIDE) 12.5 mg, Oral, Daily    HYDROcodone-acetaminophen (NORCO) 5-325 mg per tablet 1 tablet, Oral, Every 6 hours PRN    ibandronate (BONIVA) 150 mg, Oral, Every 30 days    ibuprofen (ADVIL,MOTRIN) 600 mg, Oral, Every 6 hours PRN    inulin (FIBER GUMMIES ORAL) Oral    methocarbamoL (ROBAXIN) 500 mg, Oral, 3 times daily PRN    montelukast (SINGULAIR) 10 mg, Oral, Nightly    nebulizer and compressor Domonique as per administration instructions    NIFEdipine (PROCARDIA-XL) 30 mg, Oral, Daily    pantoprazole (PROTONIX) 40 mg, Oral, Daily    paroxetine (PAXIL) 40 mg, Oral, Daily    tiZANidine (ZANAFLEX) 4 MG tablet Oral, Every 8 hours PRN         Review of Systems   Neurological:  Positive for dizziness, syncope, facial asymmetry, weakness and numbness.   All other systems reviewed and are negative.    Objective:     Vital Signs (Most Recent):  Temp: 98.1 °F (36.7 °C) (12/25/24 1121)  Pulse: (!) 55 (12/25/24 1403)  Resp: 20 (12/25/24 1403)  BP: (!) 152/67 (12/25/24 1403)  SpO2: 100 % (12/25/24 1403)    Vital Signs Range (Last 24H):  Temp:  [98.1 °F (36.7 °C)]   Pulse:  [55-79]   Resp:  [16-20]   BP: (152-177)/()   SpO2:  [98 %-100 %]        Physical Exam  Vitals and nursing note reviewed.   Eyes:      General:         Right eye: No discharge.         Left eye: No discharge.      Extraocular Movements: Extraocular movements intact.      Conjunctiva/sclera: Conjunctivae normal.   Cardiovascular:      Rate and Rhythm: Normal rate.   Pulmonary:      Effort: Pulmonary effort is normal. No respiratory distress.   Abdominal:      General: There is no distension.   Musculoskeletal:      Cervical back: Normal range of motion and neck supple.   Skin:     General: Skin is warm and dry.   Neurological:      Mental Status: She is alert and oriented to person, place, and time.      Sensory:  "Sensory deficit present.      Motor: Weakness present.   Psychiatric:         Mood and Affect: Mood normal.            Neurological Exam:   LOC: alert  Attention Span: Good   Language: No aphasia  Articulation: No dysarthria  Orientation: Person, Place, Time   Visual Fields: Full  EOM (CN III, IV, VI): Full/intact  Facial Sensation (CN V): Normal  Facial Movement (CN VII): Lower facial weakness on the Left  Sensation: Martir-anesthesia right      Laboratory:  CMP: No results for input(s): "GLUCOSE", "CALCIUM", "ALBUMIN", "PROT", "NA", "K", "CO2", "CL", "BUN", "CREATININE", "ALKPHOS", "ALT", "AST", "BILITOT" in the last 24 hours.  CBC:   Recent Labs   Lab 12/25/24  1238   WBC 5.99   RBC 5.99*   HGB 13.7   HCT 45.8      MCV 77*   MCH 22.9*   MCHC 29.9*     Lipid Panel: No results for input(s): "CHOL", "LDLCALC", "HDL", "TRIG" in the last 168 hours.  Coagulation: No results for input(s): "PT", "INR", "APTT" in the last 168 hours.  Hgb A1C: No results for input(s): "HGBA1C" in the last 168 hours.  TSH: No results for input(s): "TSH" in the last 168 hours.    Diagnostic Results:      Brain imaging/Vessel Imaging:  CTA Stroke MP 12/25/2024 Imaging reviewed as acquired. CTH is negative for acute findings. CTA is negative for LVO.    Cardiac Evaluation:   EKG 12/25/2024 NSR    "

## 2024-12-25 NOTE — CONSULTS
[facility] - Emergency Dept  Vascular Neurology  [facility]  Consult Note    Inpatient consult to Vascular (Stroke) Neurology  Consult performed by: Nunu Mathur DNP  Consult ordered by: Asim Contreras MD  Reason for consult: RSW, R numbness        Assessment/Plan:     Right sided weakness  Right sided numbness   Raquel Finch is a 59 y.o. female with a significant medical history of HTN, asthma, GERD, partial rotator cuff tear (right) who presents to the hospital for evaluation of RSW and numbness. She had right sided paresthesias over the past week w/an episode of weakness 2 days ago. She was speaking with her  when she had a syncopal episode. After the episode the patient had RSW. On arrival to the ED the patient is awake and oriented x 3. Patient needed repeated encouragement to participate with exam. Left facial droop, R sided drift, right numbness to light touch. NIHSS 3. CTA Stroke MP negative for acute findings, LVO, or high grade stenosis.    Recommendations:  -IV thrombolytic not recommended as the patient's symptoms began >24° ago  -Exam is not entirely consistent with a primary cerebrovascular etiology.  -ED workup in progress. If workup is unrevealing, an MRI Brain could be obtained to definitively rule out stroke.        Essential hypertension  -Vascular disease risk factor. SBP<220 for now.          STROKE DOCUMENTATION     Acute Stroke Times   Last Known Normal Date: 12/23/24  Last Known Normal Time: 1030  Unknown Normal Time: Unknown Time  Unknown Symptom Onset Date: Unknown Date  Unknown Symptom Onset Time: Unknown Time    NIH Scale:  Interval: baseline  1a. Level of Consciousness: 0-->Alert, keenly responsive  1b. LOC Questions: 0-->Answers both questions correctly  1c. LOC Commands: 0-->Performs both tasks correctly  2. Best Gaze: 0-->Normal  3. Visual: 0-->No visual loss  4. Facial Palsy: 0-->Normal symmetrical movements (left facial droop)  5a. Motor Arm, Left:  0-->No drift, limb holds 90 (or 45) degrees for full 10 secs  5b. Motor Arm, Right: 1-->Drift, limb holds 90 (or 45) degrees, but drifts down before full 10 secs, does not hit bed or other support  6a. Motor Leg, Left: 0-->No drift, leg holds 30 degree position for full 5 secs  6b. Motor Leg, Right: 1-->Drift, leg falls by the end of the 5-sec period but does not hit bed  7. Limb Ataxia: 0-->Absent  8. Sensory: 1-->Mild-to-moderate sensory loss, patient feels pinprick is less sharp or is dull on the affected side, or there is a loss of superficial pain with pinprick, but patient is aware of being touched (right side)  9. Best Language: 0-->No aphasia, normal  10. Dysarthria: 0-->Normal  11. Extinction and Inattention (formerly Neglect): 0-->No abnormality  Total (NIH Stroke Scale): 3    Modified [place] Score: 1  Cristina Coma Scale:15   ABCD2 Score:    [id number] Score:   HAS -BLED Score:   ICH Score:   Hunt & De Guzman Classification:       Thrombolysis Candidate? No, Out of window - Symptom onset > 4.5 hours    Delays to Thrombolysis?  Not Applicable    Interventional Revascularization Candidate?   Is the patient eligible for mechanical endovascular reperfusion ([facility])?  No; No large vessel occlusion identified on imaging     Delays to Thrombectomy? Not Applicable    Hemorrhagic change of an Ischemic Stroke: Does this patient have an ischemic stroke with hemorrhagic changes? No     Subjective:     History of Present Illness:  Raquel Finch is a 59 y.o. female with a significant medical history of HTN, asthma, GERD, partial rotator cuff tear (right) who presents to the hospital for evaluation of RSW and numbness. The patient states she started having R sided paresthesias over the past week with some weakness 2 days ago. Today she was speaking with her  when she had a syncopal event. After, the patient was noted to have RSW. EMS was activated. The patient was brought to the ED for evaluation, a Stroke Code was  activated.            Past Medical History:   Diagnosis Date    Anemia     Arthritis     Asthma     Colon polyps 04/2012    Elevated CPK     Encounter for blood transfusion     Hypertension     Kidney stones     Osteoporosis     Personal history of colonic polyps 01/24/2023    PUD (peptic ulcer disease)      Past Surgical History:   Procedure Laterality Date    COLONOSCOPY N/A 12/13/2017    Procedure: COLONOSCOPY;  Surgeon: Laxmi Crow MD;  Location: [facility];  Service: Endoscopy;  Laterality: N/A;    COLONOSCOPY N/A 1/24/2023    Procedure: COLONOSCOPY;  Surgeon: Lima Rojas MD;  Location: [facility];  Service: Endoscopy;  Laterality: N/A;    COLONOSCOPY W/ POLYPECTOMY      COLONOSCOPY W/ POLYPECTOMY      CYSTOSCOPY W/ STONE MANIPULATION Right 03/02/2017    DILATION AND CURETTAGE OF UTERUS      ESOPHAGOGASTRODUODENOSCOPY      ESOPHAGOGASTRODUODENOSCOPY      ESOPHAGOGASTRODUODENOSCOPY N/A 5/25/2023    Procedure: EGD (ESOPHAGOGASTRODUODENOSCOPY);  Surgeon: Lima Rojas MD;  Location: [facility];  Service: Endoscopy;  Laterality: N/A;    TUBAL LIGATION       Social History     Tobacco Use    Smoking status: Never    Smokeless tobacco: Never   Substance Use Topics    Alcohol use: No     Comment: rarely    Drug use: No     Review of patient's allergies indicates:  No Known Allergies    Medications: I have reviewed the current medication administration record.    Current Outpatient Medications   Medication Instructions    albuterol (PROVENTIL/VENTOLIN HFA) 90 mcg/actuation inhaler 2 puffs, 4 times daily PRN    ALPRAZolam (XANAX) 0.5 mg, Oral, 2 times daily PRN    aspirin 81 mg, Oral, Daily    atorvastatin (LIPITOR) 20 MG tablet TK 1 T PO  QHS    budesonide-formoterol 160-4.5 mcg (SYMBICORT) 160-4.5 mcg/actuation HFAA 2 puffs, As needed (PRN)    cetirizine (ZYRTEC) 10 MG tablet TAKE 1 TABLET BY MOUTH DAILY AS NEEDED FOR SINUS    fluticasone propionate (FLONASE) 100 mcg, Each Nostril, Daily    fluticasone  propionate (FLOVENT HFA) 220 mcg/actuation inhaler 2 puffs, As needed (PRN)    gabapentin (NEURONTIN) 300 mg, Oral, Nightly    hydroCHLOROthiazide (MICROZIDE) 12.5 mg, Oral, Daily    HYDROcodone-acetaminophen (NORCO) 5-325 mg per tablet 1 tablet, Oral, Every 6 hours PRN    ibandronate (BONIVA) 150 mg, Oral, Every 30 days    ibuprofen (ADVIL,MOTRIN) 600 mg, Oral, Every 6 hours PRN    inulin (FIBER GUMMIES ORAL) Oral    methocarbamoL (ROBAXIN) 500 mg, Oral, 3 times daily PRN    montelukast (SINGULAIR) 10 mg, Oral, Nightly    nebulizer and compressor Domonique as per administration instructions    NIFEdipine (PROCARDIA-XL) 30 mg, Oral, Daily    pantoprazole (PROTONIX) 40 mg, Oral, Daily    paroxetine (PAXIL) 40 mg, Oral, Daily    tiZANidine (ZANAFLEX) 4 MG tablet Oral, Every 8 hours PRN         Review of Systems   Neurological:  Positive for dizziness, syncope, facial asymmetry, weakness and numbness.   All other systems reviewed and are negative.    Objective:     Vital Signs (Most Recent):  Temp: 98.1 °F (36.7 °C) (12/25/24 1121)  Pulse: (!) 55 (12/25/24 1403)  Resp: 20 (12/25/24 1403)  BP: (!) 152/67 (12/25/24 1403)  SpO2: 100 % (12/25/24 1403)    Vital Signs Range (Last 24H):  Temp:  [98.1 °F (36.7 °C)]   Pulse:  [55-79]   Resp:  [16-20]   BP: (152-177)/()   SpO2:  [98 %-100 %]        Physical Exam  Vitals and nursing note reviewed.   Eyes:      General:         Right eye: No discharge.         Left eye: No discharge.      Extraocular Movements: Extraocular movements intact.      Conjunctiva/sclera: Conjunctivae normal.   Cardiovascular:      Rate and Rhythm: Normal rate.   Pulmonary:      Effort: Pulmonary effort is normal. No respiratory distress.   Abdominal:      General: There is no distension.   Musculoskeletal:      Cervical back: Normal range of motion and neck supple.   Skin:     General: Skin is warm and dry.   Neurological:      Mental Status: She is alert and oriented to person, place, and time.       "Sensory: Sensory deficit present.      Motor: Weakness present.   Psychiatric:         Mood and Affect: Mood normal.            Neurological Exam:   LOC: alert  Attention Span: Good   Language: No aphasia  Articulation: No dysarthria  Orientation: Person, Place, Time   Visual Fields: Full  EOM (CN III, IV, VI): Full/intact  Facial Sensation (CN V): Normal  Facial Movement (CN VII): Lower facial weakness on the Left  Sensation: Martir-anesthesia right      Laboratory:  CMP: No results for input(s): "GLUCOSE", "CALCIUM", "ALBUMIN", "PROT", "NA", "K", "CO2", "CL", "BUN", "CREATININE", "ALKPHOS", "ALT", "AST", "BILITOT" in the last 24 hours.  CBC:   Recent Labs   Lab 12/25/24  1238   WBC 5.99   RBC 5.99*   HGB 13.7   HCT 45.8      MCV 77*   MCH 22.9*   MCHC 29.9*     Lipid Panel: No results for input(s): "CHOL", "LDLCALC", "HDL", "TRIG" in the last 168 hours.  Coagulation: No results for input(s): "PT", "INR", "APTT" in the last 168 hours.  Hgb A1C: No results for input(s): "HGBA1C" in the last 168 hours.  TSH: No results for input(s): "TSH" in the last 168 hours.    Diagnostic Results:      Brain imaging/Vessel Imaging:  CTA Stroke MP 12/25/2024 Imaging reviewed as acquired. CTH is negative for acute findings. CTA is negative for LVO.    Cardiac Evaluation:   EKG 12/25/2024 NSR      Nunu Mathur, KIM  [facility]  Department of Vascular Neurology   [facility]   "

## 2024-12-25 NOTE — ASSESSMENT & PLAN NOTE
Right sided numbness   Raquel Finch is a 59 y.o. female with a significant medical history of HTN, asthma, GERD, partial rotator cuff tear (right) who presents to the hospital for evaluation of RSW and numbness. She had right sided paresthesias over the past week w/an episode of weakness 2 days ago. She was speaking with her  when she had a syncopal episode. After the episode the patient had RSW. On arrival to the ED the patient is awake and oriented x 3. Patient needed repeated encouragement to participate with exam. Left facial droop, R sided drift, right numbness to light touch. NIHSS 3. CTA Stroke MP negative for acute findings, LVO, or high grade stenosis.    Recommendations:  -IV thrombolytic not recommended as the patient's symptoms began >24° ago  -Exam is not entirely consistent with a primary cerebrovascular etiology.  -ED workup in progress. If workup is unrevealing, an MRI Brain could be obtained to definitively rule out stroke.

## 2024-12-26 LAB
OHS QRS DURATION: 84 MS
OHS QTC CALCULATION: 438 MS

## 2024-12-26 NOTE — DISCHARGE INSTRUCTIONS
No emergent cause of your symptoms was found today.  Overall your imaging and labs were reassuring.  Sometimes, early in a disease course, an ER evaluation may be falsely reassuring.  Return to the ER for any worsening symptoms.

## 2025-01-03 ENCOUNTER — OFFICE VISIT (OUTPATIENT)
Dept: OTOLARYNGOLOGY | Facility: CLINIC | Age: 60
End: 2025-01-03
Payer: MEDICAID

## 2025-01-03 ENCOUNTER — CLINICAL SUPPORT (OUTPATIENT)
Dept: AUDIOLOGY | Facility: CLINIC | Age: 60
End: 2025-01-03
Payer: MEDICAID

## 2025-01-03 DIAGNOSIS — H93.293 ABNORMAL AUDITORY PERCEPTION OF BOTH EARS: Primary | ICD-10-CM

## 2025-01-03 DIAGNOSIS — R42 VERTIGO: ICD-10-CM

## 2025-01-03 DIAGNOSIS — R42 DIZZINESS: ICD-10-CM

## 2025-01-03 PROBLEM — Z78.9 IMPAIRED MOBILITY AND ACTIVITIES OF DAILY LIVING: Status: ACTIVE | Noted: 2025-01-03

## 2025-01-03 PROBLEM — Z74.09 IMPAIRED MOBILITY AND ACTIVITIES OF DAILY LIVING: Status: ACTIVE | Noted: 2025-01-03

## 2025-01-03 PROCEDURE — 99999 PR PBB SHADOW E&M-EST. PATIENT-LVL III: CPT | Mod: PBBFAC,,, | Performed by: OTOLARYNGOLOGY

## 2025-01-03 PROCEDURE — 99213 OFFICE O/P EST LOW 20 MIN: CPT | Mod: PBBFAC | Performed by: OTOLARYNGOLOGY

## 2025-01-03 NOTE — PROGRESS NOTES
Ear, Nose, & Throat  Otolaryngology - Head & Neck Surgery    Summary of Visit:  Raquel Finch is a kind patient who was referred to me by Analilia Vences in consultation for Dizziness      Subjective:     Chief Complaint:   Chief Complaint   Patient presents with    Dizziness       Raquel Finch is a 59 y.o. female who was referred to me by Analilia Vences in consultation for dizziness.  On [place] day, she sustained an episode where she experienced syncope while standing from a seated position.  She was seen in the emergency room and evaluation initially was negative.  With a successive 2 days, she had trouble trips to the emergency room for repeated symptoms associated with chest pain.  She denies any true vertigo sensation has had subjective hearing loss, tinnitus, otalgia.  She does have an echocardiogram ordered as well as a cardiology consult pending.  CT angiogram was performed 12/25/2020 were negative.      Past Medical History  Active Ambulatory Problems     Diagnosis Date Noted    Partial nontraumatic rupture of right rotator cuff 12/23/2020    Chronic right shoulder pain 12/23/2020    Mild intermittent asthma without complication 01/23/2023    Nonintractable episodic headache 01/23/2023    Allergic rhinitis 01/23/2023    Nasal deformity, acquired 01/23/2023    Nasal septal deviation 01/23/2023    Personal history of colonic polyps 01/24/2023    Essential hypertension 03/31/2023    Gastroesophageal reflux disease 03/31/2023    Right sided numbness 12/25/2024    Right sided weakness 12/25/2024    Impaired mobility and activities of daily living 01/03/2025     Resolved Ambulatory Problems     Diagnosis Date Noted    Screening for malignant neoplasm of colon 12/13/2017    Acute pain of right shoulder 12/23/2020     Past Medical History:   Diagnosis Date    Anemia     Arthritis     Asthma     Colon polyps 04/2012    Elevated CPK     Encounter for blood transfusion     Hypertension     Kidney  stones     Osteoporosis     PUD (peptic ulcer disease)        Past Surgical History  She has a past surgical history that includes Tubal ligation; Cystoscopy w/ stone manipulation (Right, 03/02/2017); Dilation and curettage of uterus; Colonoscopy w/ polypectomy; Esophagogastroduodenoscopy; Colonoscopy (N/A, 12/13/2017); Esophagogastroduodenoscopy; Colonoscopy w/ polypectomy; Colonoscopy (N/A, 1/24/2023); and Esophagogastroduodenoscopy (N/A, 5/25/2023).    Past Surgical History:   Procedure Laterality Date    COLONOSCOPY N/A 12/13/2017    Procedure: COLONOSCOPY;  Surgeon: Laxmi Crow MD;  Location: [facility];  Service: Endoscopy;  Laterality: N/A;    COLONOSCOPY N/A 1/24/2023    Procedure: COLONOSCOPY;  Surgeon: Lima Rojas MD;  Location: [facility];  Service: Endoscopy;  Laterality: N/A;    COLONOSCOPY W/ POLYPECTOMY      COLONOSCOPY W/ POLYPECTOMY      CYSTOSCOPY W/ STONE MANIPULATION Right 03/02/2017    DILATION AND CURETTAGE OF UTERUS      ESOPHAGOGASTRODUODENOSCOPY      ESOPHAGOGASTRODUODENOSCOPY      ESOPHAGOGASTRODUODENOSCOPY N/A 5/25/2023    Procedure: EGD (ESOPHAGOGASTRODUODENOSCOPY);  Surgeon: Lima Rojas MD;  Location: [facility];  Service: Endoscopy;  Laterality: N/A;    TUBAL LIGATION          Family History  Her family history includes Heart disease in her mother; Hypertension in her father and mother; Stroke in her father.    Social History  She reports that she has never smoked. She has never used smokeless tobacco. She reports that she does not drink alcohol and does not use drugs.    Allergies  She has No Known Allergies.    Medications  She has a current medication list which includes the following prescription(s): albuterol, alprazolam, aspirin, atorvastatin, budesonide-formoterol 160-4.5 mcg, cetirizine, fluticasone propionate, fluticasone propionate, gabapentin, hydrochlorothiazide, hydrocodone-acetaminophen, ibandronate, ibuprofen, inulin, meclizine, methocarbamol,  montelukast, nebulizer and compressor, nifedipine, ondansetron, pantoprazole, paroxetine, and tizanidine, and the following Facility-Administered Medications: 0.9% nacl and sodium chloride 0.9%.    ROS:  Pertinent positive and negative review of systems as noted in HPI.     Objective:     There were no vitals taken for this visit.   General Appearance:   Awake, Alert and Oriented. NAD. Appropriate affect and appearance      Neuro:   Spontaneous eye opening, appropriate verbal responses, follows commands  Pupils equal, round & brisk. EOMI, no proptosis, no spontaneous nystagmus  Face is symmetric, HB I, non-edematous and SILT bilaterally  Vision grossly intact, Hearing grossly intact  AMIE, normal tone     Head and Face:   skin is intact, facial movement symmetric     Ears:  Periauricular regions non-erythematous, non-fluctuanct non-tender  Pinna normal bilaterally, no skin lesions  EACs patent and without drainage bilaterally   Tympanic membranes are normal in appearance bilaterally.  No middle ear effusion noted bilaterally.    Nose:   External nose is symmetric, no skin lesions  Septum midline, No inferior turbinate hypertrophy, No polyps or rhinorrhea     OC/OP:  Tongue midline on extension, non-edematous, soft  No labial, buccal, oral tongue or floor of mouth lesions  Soft palate symmetric, midline and without lesions or masses, tonsils symmetric  No masses or lesions of the visualized oropharynx     Neck:  Neck is symmetric, non-edematous, non-erythematous  Trachea is midline and easily palpable,  No palpable adenopathy or masses in levels I-VI     Glands:  Parotid and submandibular glands are symmetric and non-tender.   No thyroid nodules or masses, non-tender      Respiratory:  Normal work of breathing, no accessory muscle use, no stridor     Voice:  Normal vocal quality, volume, prosody and articulation     Vestibular:   Spontaneous Nystagmus: none   Smooth Pursuit: grossly unremarkable   Saccades: grossly  unremarkable     Gaze-Evoked Nystagmus: None   Head-Thrust: DNT   Gait: Normal    Otego-Hallpike: Normal bilaterally    Post-Head Shake: No Nystagmus    Fukuda Step Test: Normal  Neuro/Psychiatric:     Affect: Normal   Cognition: Appropriate  Cerebellar   Alternating Finger to Nose: Normal   Rapid Alternating Movements: Normal  Proprioception   Romberg: Normal   Data Review:     IMAGING    CT angiogram 12/25 in 12/28 negative for acute hemorrhage    AUDIO            Assessment:     Dizziness and lightheadedness      Plan:     I had a long discussion with the patient regarding her condition and the further workup and management options.  She has a findings consistent with peripheral vestibular source of her imbalance and syncope spells.  Her hearing is .  Her vestibular exam is.  My suspicion is that she is experiencing a cardiogenic source of her lightheadedness and syncope.  I encouraged her to maintain her echocardiogram and cardiology appointments.  Follow up with me on an as-needed basis.      No orders of the defined types were placed in this encounter.         Problem List Items Addressed This Visit    None  Visit Diagnoses       Vertigo

## 2025-01-03 NOTE — PROGRESS NOTES
Raquel Finch, a 59 y.o. female, was seen today in the clinic for an audiologic evaluation. Ms. Finch reported episodic vertigo. She noted standing up triggers episodes. She reported aural fullness in both ears. Patient denied decreased hearing, tinnitus, and otalgia.    Tympanometry revealed Type A tympanogram in the right ear and Type A tympanogram in the left ear. Audiogram results revealed normal hearing sensitivity in the right ear and normal hearing sensitivity in the left ear.  Speech reception thresholds were noted at 20 dBHL in the right ear and 15 dBHL in the left ear.  Speech discrimination scores were 100% in the right ear and 100% in the left ear.    Recommendations:  Otologic evaluation  Repeat audiogram as needed  Hearing protection in noise

## 2025-01-09 ENCOUNTER — OFFICE VISIT (OUTPATIENT)
Dept: NEUROLOGY | Facility: CLINIC | Age: 60
End: 2025-01-09
Payer: MEDICAID

## 2025-01-09 VITALS
HEIGHT: 68 IN | HEART RATE: 60 BPM | SYSTOLIC BLOOD PRESSURE: 113 MMHG | WEIGHT: 225.75 LBS | DIASTOLIC BLOOD PRESSURE: 79 MMHG | BODY MASS INDEX: 34.21 KG/M2

## 2025-01-09 DIAGNOSIS — Z78.9 IMPAIRED MOBILITY AND ACTIVITIES OF DAILY LIVING: Primary | ICD-10-CM

## 2025-01-09 DIAGNOSIS — M54.16 LUMBAR RADICULOPATHY: ICD-10-CM

## 2025-01-09 DIAGNOSIS — G45.9 TRANSIENT CEREBRAL ISCHEMIA, UNSPECIFIED TYPE: ICD-10-CM

## 2025-01-09 DIAGNOSIS — Z74.09 IMPAIRED MOBILITY AND ACTIVITIES OF DAILY LIVING: Primary | ICD-10-CM

## 2025-01-09 PROCEDURE — 3074F SYST BP LT 130 MM HG: CPT | Mod: CPTII,,, | Performed by: STUDENT IN AN ORGANIZED HEALTH CARE EDUCATION/TRAINING PROGRAM

## 2025-01-09 PROCEDURE — 3008F BODY MASS INDEX DOCD: CPT | Mod: CPTII,,, | Performed by: STUDENT IN AN ORGANIZED HEALTH CARE EDUCATION/TRAINING PROGRAM

## 2025-01-09 PROCEDURE — 1159F MED LIST DOCD IN RCRD: CPT | Mod: CPTII,,, | Performed by: STUDENT IN AN ORGANIZED HEALTH CARE EDUCATION/TRAINING PROGRAM

## 2025-01-09 PROCEDURE — 99214 OFFICE O/P EST MOD 30 MIN: CPT | Mod: S$PBB,,, | Performed by: STUDENT IN AN ORGANIZED HEALTH CARE EDUCATION/TRAINING PROGRAM

## 2025-01-09 PROCEDURE — 99999 PR PBB SHADOW E&M-EST. PATIENT-LVL V: CPT | Mod: PBBFAC,,, | Performed by: STUDENT IN AN ORGANIZED HEALTH CARE EDUCATION/TRAINING PROGRAM

## 2025-01-09 PROCEDURE — 3078F DIAST BP <80 MM HG: CPT | Mod: CPTII,,, | Performed by: STUDENT IN AN ORGANIZED HEALTH CARE EDUCATION/TRAINING PROGRAM

## 2025-01-09 PROCEDURE — 99215 OFFICE O/P EST HI 40 MIN: CPT | Mod: PBBFAC | Performed by: STUDENT IN AN ORGANIZED HEALTH CARE EDUCATION/TRAINING PROGRAM

## 2025-01-10 ENCOUNTER — TELEPHONE (OUTPATIENT)
Dept: ORTHOPEDICS | Facility: CLINIC | Age: 60
End: 2025-01-10
Payer: MEDICAID

## 2025-01-10 DIAGNOSIS — M54.41 CHRONIC RIGHT-SIDED LOW BACK PAIN WITH RIGHT-SIDED SCIATICA: Primary | ICD-10-CM

## 2025-01-10 DIAGNOSIS — G89.29 CHRONIC RIGHT-SIDED LOW BACK PAIN WITH RIGHT-SIDED SCIATICA: Primary | ICD-10-CM

## 2025-01-10 PROBLEM — Z78.9 IMPAIRED MOBILITY AND ACTIVITIES OF DAILY LIVING: Status: RESOLVED | Noted: 2025-01-03 | Resolved: 2025-01-10

## 2025-01-10 PROBLEM — M25.69 DECREASED RANGE OF MOTION OF TRUNK AND BACK: Status: ACTIVE | Noted: 2025-01-10

## 2025-01-10 PROBLEM — R29.898 DECREASED STRENGTH OF TRUNK AND BACK: Status: ACTIVE | Noted: 2025-01-10

## 2025-01-10 PROBLEM — Z74.09 IMPAIRED MOBILITY AND ACTIVITIES OF DAILY LIVING: Status: RESOLVED | Noted: 2025-01-03 | Resolved: 2025-01-10

## 2025-01-10 PROBLEM — R29.898 DECREASED STRENGTH OF LOWER EXTREMITY: Status: ACTIVE | Noted: 2025-01-10

## 2025-01-14 ENCOUNTER — TELEPHONE (OUTPATIENT)
Dept: NEUROLOGY | Facility: CLINIC | Age: 60
End: 2025-01-14
Payer: MEDICAID

## 2025-01-14 NOTE — TELEPHONE ENCOUNTER
----- Message from Maritza sent at 1/14/2025  6:48 AM CST -----  Regarding: Pt called to speak to the nurse regarding her care due to orthopedics canceling her appt and told her that the care plan states that she needs to see pain managment first and pt would like a call back this morning  Type:  Needs Medical Advice    Who Called: Pt called to speak to the nurse regarding her care due to orthopedics canceling her appt and told her that the care plan states that she needs to see pain managment first and pt would like a call back this morning  Would the patient rather a call back or a response via [facility]? Call Back  Best Call Back Number: [phone]

## 2025-01-14 NOTE — TELEPHONE ENCOUNTER
The pt will reach out to her pcp for referral to pain management. She stated that her appt with cardiology was canceled and they recommended that she f/u with pain management first.

## 2025-01-17 ENCOUNTER — HOSPITAL ENCOUNTER (OUTPATIENT)
Dept: RADIOLOGY | Facility: HOSPITAL | Age: 60
Discharge: HOME | End: 2025-01-17
Attending: STUDENT IN AN ORGANIZED HEALTH CARE EDUCATION/TRAINING PROGRAM
Payer: MEDICAID

## 2025-01-17 DIAGNOSIS — G45.9 TRANSIENT CEREBRAL ISCHEMIA, UNSPECIFIED TYPE: ICD-10-CM

## 2025-01-17 PROCEDURE — 70551 MRI BRAIN STEM W/O DYE: CPT | Mod: TC

## 2025-01-17 PROCEDURE — 70551 MRI BRAIN STEM W/O DYE: CPT | Mod: 26,,, | Performed by: RADIOLOGY

## 2025-01-20 ENCOUNTER — PATIENT MESSAGE (OUTPATIENT)
Dept: ORTHOPEDICS | Facility: CLINIC | Age: 60
End: 2025-01-20
Payer: MEDICAID

## 2025-01-29 ENCOUNTER — OFFICE VISIT (OUTPATIENT)
Dept: ORTHOPEDICS | Facility: CLINIC | Age: 60
End: 2025-01-29
Payer: MEDICAID

## 2025-01-29 VITALS — WEIGHT: 219.38 LBS | BODY MASS INDEX: 33.25 KG/M2 | HEIGHT: 68 IN

## 2025-01-29 DIAGNOSIS — M75.111 PARTIAL NONTRAUMATIC RUPTURE OF RIGHT ROTATOR CUFF: Primary | ICD-10-CM

## 2025-01-29 DIAGNOSIS — M25.511 CHRONIC RIGHT SHOULDER PAIN: ICD-10-CM

## 2025-01-29 DIAGNOSIS — G89.29 CHRONIC RIGHT SHOULDER PAIN: ICD-10-CM

## 2025-01-29 PROCEDURE — 1159F MED LIST DOCD IN RCRD: CPT | Mod: CPTII,,, | Performed by: PHYSICIAN ASSISTANT

## 2025-01-29 PROCEDURE — 20610 DRAIN/INJ JOINT/BURSA W/O US: CPT | Mod: S$PBB,RT,, | Performed by: PHYSICIAN ASSISTANT

## 2025-01-29 PROCEDURE — 99999PBSHW PR PBB SHADOW TECHNICAL ONLY FILED TO HB: Mod: PBBFAC,,,

## 2025-01-29 PROCEDURE — 3008F BODY MASS INDEX DOCD: CPT | Mod: CPTII,,, | Performed by: PHYSICIAN ASSISTANT

## 2025-01-29 PROCEDURE — 20610 DRAIN/INJ JOINT/BURSA W/O US: CPT | Mod: PBBFAC,PN,RT | Performed by: PHYSICIAN ASSISTANT

## 2025-01-29 PROCEDURE — 99213 OFFICE O/P EST LOW 20 MIN: CPT | Mod: 25,S$PBB,, | Performed by: PHYSICIAN ASSISTANT

## 2025-01-29 PROCEDURE — 99999 PR PBB SHADOW E&M-EST. PATIENT-LVL III: CPT | Mod: PBBFAC,,, | Performed by: PHYSICIAN ASSISTANT

## 2025-01-29 PROCEDURE — 99213 OFFICE O/P EST LOW 20 MIN: CPT | Mod: PBBFAC,PN | Performed by: PHYSICIAN ASSISTANT

## 2025-01-29 RX ORDER — TRIAMCINOLONE ACETONIDE 40 MG/ML
40 INJECTION, SUSPENSION INTRA-ARTICULAR; INTRAMUSCULAR
Status: DISCONTINUED | OUTPATIENT
Start: 2025-01-29 | End: 2025-01-29 | Stop reason: HOSPADM

## 2025-01-29 RX ADMIN — TRIAMCINOLONE ACETONIDE 40 MG: 40 INJECTION, SUSPENSION INTRA-ARTICULAR; INTRAMUSCULAR at 01:01

## 2025-01-29 NOTE — PROCEDURES
Large Joint Aspiration/Injection: R subacromial bursa    Date/Time: 1/29/2025 1:30 PM    Performed by: Cyndy Goldman PA-C  Authorized by: Cyndy Goldman PA-C    Consent Done?:  Yes (Verbal)  Indications:  Pain  Site marked: the procedure site was marked    Timeout: prior to procedure the correct patient, procedure, and site was verified    Prep: patient was prepped and draped in usual sterile fashion      Local anesthesia used?: Yes    Local anesthetic:  Lidocaine 1% without epinephrine and topical anesthetic    Details:  Needle Size:  21 G  Ultrasonic Guidance for needle placement?: No    Approach:  Posterior  Location:  Shoulder  Site:  R subacromial bursa  Medications:  40 mg triamcinolone acetonide 40 mg/mL  Patient tolerance:  Patient tolerated the procedure well with no immediate complications

## 2025-01-29 NOTE — PROGRESS NOTES
Subjective:      Chief Complaint: Pain of the Right Shoulder    Patient ID: Raquel Finch is a 59 y.o. female.  58yo F presents with follow-up right shoulder pain.  MRI at [facility] in 2020 revealed partial rotator cuff tear.  Right subacromial corticosteroid injections usually helped for about 1 year.  In the meanwhile she has been using ice, ibuprofen, naproxen.  She states she only notes symptoms with cold weather and picking up heavy objects.  Pain is superior shoulder.  Denies radicular symptoms or paresthesias.        Past Medical History:   Diagnosis Date    Anemia     Arthritis     Asthma     Colon polyps 04/2012    Elevated CPK     Encounter for blood transfusion     Hypertension     Kidney stones     Osteoporosis     Personal history of colonic polyps 01/24/2023    PUD (peptic ulcer disease)         Past Surgical History:   Procedure Laterality Date    COLONOSCOPY N/A 12/13/2017    Procedure: COLONOSCOPY;  Surgeon: Laxmi Crow MD;  Location: [facility];  Service: Endoscopy;  Laterality: N/A;    COLONOSCOPY N/A 1/24/2023    Procedure: COLONOSCOPY;  Surgeon: Lima Rojas MD;  Location: [facility];  Service: Endoscopy;  Laterality: N/A;    COLONOSCOPY W/ POLYPECTOMY      COLONOSCOPY W/ POLYPECTOMY      CYSTOSCOPY W/ STONE MANIPULATION Right 03/02/2017    DILATION AND CURETTAGE OF UTERUS      ESOPHAGOGASTRODUODENOSCOPY      ESOPHAGOGASTRODUODENOSCOPY      ESOPHAGOGASTRODUODENOSCOPY N/A 5/25/2023    Procedure: EGD (ESOPHAGOGASTRODUODENOSCOPY);  Surgeon: Lima Rojas MD;  Location: [facility];  Service: Endoscopy;  Laterality: N/A;    TUBAL LIGATION          Current Outpatient Medications   Medication Instructions    albuterol (PROVENTIL/VENTOLIN HFA) 90 mcg/actuation inhaler 2 puffs, 4 times daily PRN    ALPRAZolam (XANAX) 0.5 mg, 2 times daily PRN    aspirin 81 mg, Daily    atorvastatin (LIPITOR) 20 MG tablet TK 1 T PO  QHS    budesonide-formoterol 160-4.5 mcg (SYMBICORT) 160-4.5 mcg/actuation HFAA  "2 puffs, As needed (PRN)    cetirizine (ZYRTEC) 10 MG tablet TAKE 1 TABLET BY MOUTH DAILY AS NEEDED FOR SINUS    fluticasone propionate (FLONASE) 100 mcg, Each Nostril, Daily    fluticasone propionate (FLOVENT HFA) 220 mcg/actuation inhaler 2 puffs, As needed (PRN)    gabapentin (NEURONTIN) 300 mg, Nightly    hydroCHLOROthiazide (MICROZIDE) 12.5 mg, Daily PRN    HYDROcodone-acetaminophen (NORCO) 5-325 mg per tablet 1 tablet, Oral, Every 6 hours PRN    ibandronate (BONIVA) 150 mg, Every 30 days    ibuprofen (ADVIL,MOTRIN) 600 mg, Oral, Every 6 hours PRN    inulin (FIBER GUMMIES ORAL) Take by mouth.    meclizine (ANTIVERT) 25 mg, Oral, 3 times daily PRN    methocarbamoL (ROBAXIN) 500 mg, Oral, 3 times daily PRN    montelukast (SINGULAIR) 10 mg, Nightly    nebulizer and compressor Domonique as per administration instructions    ondansetron (ZOFRAN) 4 mg, Oral, Every 6 hours    pantoprazole (PROTONIX) 40 mg, Daily    paroxetine (PAXIL) 40 mg, Daily    tiZANidine (ZANAFLEX) 4 MG tablet Every 8 hours PRN        Review of patient's allergies indicates:  No Known Allergies    Review of Systems   Constitutional: Negative for fever and malaise/fatigue.   Eyes:  Negative for blurred vision.   Cardiovascular:  Negative for chest pain.   Respiratory:  Negative for shortness of breath.    Skin:  Negative for poor wound healing.   Musculoskeletal:  Positive for joint pain and myalgias.   Genitourinary:  Negative for bladder incontinence.   Neurological:  Negative for dizziness, numbness and paresthesias.   Psychiatric/Behavioral:  Negative for altered mental status.        The patient's relevant past medical, surgical, and social history was reviewed in Epic.       Objective:      VITAL SIGNS: Ht 5' 8" (1.727 m)   Wt 99.5 kg (219 lb 5.7 oz)   BMI 33.35 kg/m²     General    Nursing note and vitals reviewed.  Constitutional: She is oriented to person, place, and time. She appears well-developed and well-nourished.   Neurological: She " is alert and oriented to person, place, and time.         Back (L-Spine & T-Spine) / Neck (C-Spine) Exam     Tenderness   The patient is tender to palpation of the right trapezial.   Right Shoulder Exam     Tenderness   The patient is tender to palpation of the supraspinatus.    Range of Motion   Active abduction:  170   Forward Flexion:  180   External Rotation 0 degrees:  90   Internal rotation 0 degrees:  T8     Tests & Signs   Drop arm: negative  Duval test: positive  Impingement: negative  Rotator Cuff Painful Arc/Range: mild  Yergason's Test: negative  Speed's Test: negative    Other   Sensation: normal    Muscle Strength   Right Upper Extremity   Shoulder Abduction: 5/5   Shoulder Internal Rotation: 5/5   Shoulder External Rotation: 5/5   Supraspinatus: 5/5   Subscapularis: 5/5   Biceps: 5/5     Vascular Exam     Right Pulses      Radial:                    2+         Imaging  Narrative & Impression  EXAMINATION:  XR SHOULDER TRAUMA 3 VIEW RIGHT     CLINICAL HISTORY:  Lumbago with sciatica, right side     TECHNIQUE:  Three or four views of the right shoulder were performed.     FINDINGS:  There is no acute fracture, dislocation, or bony erosion.     Impression:     As above.        Electronically signed by:Fermín Domínguez MD  Date:                                            01/13/2025  Time:                                           09:36    I have reviewed the above radiograph and agree with the findings stated by the radiologist.           Assessment:       Raquel Finch is a 59 y.o. female seen in the office today for   1. Partial nontraumatic rupture of right rotator cuff    2. Chronic right shoulder pain     Right shoulder partial rotator cuff tear.  We will repeat right shoulder subacromial corticosteroid injection today since this helps for about a year.  She can continue ice or OTC NSAIDs as needed.  Follow-up will be as needed.      Plan:       Raquel was seen today for pain.    Diagnoses and all  orders for this visit:    Partial nontraumatic rupture of right rotator cuff  -     Large Joint Aspiration/Injection: R subacromial bursa    Chronic right shoulder pain  -     Large Joint Aspiration/Injection: R subacromial bursa      Right shoulder Subacromial Kenalog injection given today.    Diagnoses and plan discussed with the patient, as well as the expected course and duration of his symptoms.  All questions and concerns were addressed prior to the end of the visit.   Instructed patient to call office if they have any future questions/concerns or to schedule apt. Patient will return to see me if symptoms worsen or fail to improve    Note dictated with voice recognition software, please excuse any grammatical errors.        Cyndy Goldman PA-C      Department of Orthopedic Surgery  [facility]  Office: [phone]  01/29/2025

## 2025-04-30 ENCOUNTER — OFFICE VISIT (OUTPATIENT)
Dept: PODIATRY | Facility: CLINIC | Age: 60
End: 2025-04-30
Payer: MEDICAID

## 2025-04-30 VITALS — BODY MASS INDEX: 32.36 KG/M2 | WEIGHT: 212.88 LBS

## 2025-04-30 DIAGNOSIS — L60.0 INGROWN TOENAIL OF RIGHT FOOT: Primary | ICD-10-CM

## 2025-04-30 DIAGNOSIS — L60.0 INGROWN TOENAIL OF LEFT FOOT: ICD-10-CM

## 2025-04-30 PROCEDURE — 99213 OFFICE O/P EST LOW 20 MIN: CPT | Mod: PBBFAC,PN | Performed by: PODIATRIST

## 2025-04-30 PROCEDURE — 11765 WEDGE EXCISION SKN NAIL FOLD: CPT | Mod: PBBFAC,PN | Performed by: PODIATRIST

## 2025-04-30 PROCEDURE — 99999 PR PBB SHADOW E&M-EST. PATIENT-LVL III: CPT | Mod: PBBFAC,,, | Performed by: PODIATRIST

## 2025-04-30 NOTE — PATIENT INSTRUCTIONS
Instructions for Care after Ingrown Nail removal    General Information: Stay off your feet as much as possible today. You may wear a surgical shoe, sandal or any open toed shoe that does not squeeze, constrict or put pressure on your toe(s). Your toe(s) may remain numb for up to 2-24 hours after the procedure. Although most patients can wear a closed loose fitting shoe after the first week, the toe will heal faster the more you use the open toed shoe in the first 2-3  weeks. Please contact our office if you have any questions or concerns.    Bleeding: Slight bleeding, discoloration and drainage are normal. Due to the chemical used there may be some yellow-clear drainage coming from the toe for 2-3 weeks.    Discomfort: You can elevate your foot to help alleviate minor swelling, bleeding and discomfort. You may also take Advil, Tylenol or other over the counter pain medications to help alleviate pain. Call our office if the pain is not well controlled. Most patients have very little discomfort as long as they minimize their walking for the first 24 hours and do not bump the toe.    Removing the Bandage: Starting the day after the procedure, carefully remove the dressing and shower or bathe as normal. It is Ok to get the bandage soaking wet in the shower and when you remove it, it should not stick to the surgery site.    Dressing Options- Traditional Method:  1. Soaking two times a day in WARM water with Epsom salts or diluted Povidone Iodine  (Betadine) for 15-20 minutes. You will need to purchase these products from the pharmacy.  2. Dry toe then apply an antibiotic cream or ointment such as Neosporin or Polysporin plus or  Garamycin and cover with a 2 x 2 inch size gauze and then secure with a 1 inch band aid.  3. In the second week, take the dressing off at bedtime to air dry the toe.  4. If the toe is infected take the Antibiotic Pills as directed until finished.      Ingrown Toenail        What Is an Ingrown  Toenail?    When a toenail is ingrown, it is curved and grows into the skin, usually at the nail borders (the sides of the nail). This digging in of the nail irritates the skin, often creating pain, redness, swelling and warmth in the toe.    If an ingrown nail causes a break in the skin, bacteria may enter and cause an infection in the area, which is often marked by drainage and a foul odor. However, even if the toe is not painful, red, swollen or warm, a nail that curves downward into the skin can progress to an infection.    Ingrown toenail and normal toenail    Causes  Causes of ingrown toenails include:    Heredity. In many people, the tendency for ingrown toenails is inherited.  Trauma. Sometimes an ingrown toenail is the result of trauma, such as stubbing your toe, having an object fall on your toe or engaging in activities that involve repeated pressure on the toes, such as kicking or running.  Improper trimming. The most common cause of ingrown toenails is cutting your nails too short. This encourages the skin next to the nail to fold over the nail.   Improperly sized footwear. Ingrown toenails can result from wearing socks and shoes that are tight or short.  Nail conditions. Ingrown toenails can be caused by nail problems, such as fungal infections or losing a nail due to trauma.     Treatment  Sometimes initial treatment for ingrown toenails can be safely performed at home. However, home treatment is strongly discouraged if an infection is suspected or for those who have medical conditions that put feet at high risk, such as diabetes, nerve damage in the foot or poor circulation.        Ingrown toenail before and after treatment    Home Care  If you do not have an infection or any of the above medical conditions, you can soak your foot in room-temperature water (adding Epsom salt may be recommended by your doctor) and gently massage the side of the nail fold to help reduce the inflammation.    Avoid  attempting bathroom surgery. Repeated cutting of the nail can cause the condition to worsen over time. If your symptoms fail to improve, it is time to see a foot and ankle surgeon.    Physician Care  After examining the toe, the foot and ankle surgeon will select the treatment best suited for you. If an infection is present, an oral antibiotic may be prescribed.    Sometimes a minor surgical procedure, often performed in the office, will ease the pain and remove the offending nail. After applying a local anesthetic, the doctor removes part of the nails side border. Some nails may become ingrown again, requiring removal of the nail root.    Following the nail procedure, a light bandage will be applied. Most people experience very little pain after surgery and may resume normal activity the next day. If your surgeon has prescribed an oral antibiotic, be sure to take all the medication, even if your symptoms have improved.    Preventing Ingrown Toenails  Many cases of ingrown toenails may be prevented by:    Proper trimming. Cut toenails in a fairly straight line, and do not cut them too short. You should be able to get your fingernail under the sides and end of the nail.  Well-fitting shoes and socks. Do not wear shoes that are short or tight in the toe area. Avoid shoes that are loose because they too cause pressure on the toes, especially when running or walking briskly.               What You Should Know About Home Treatment  Do not cut a notch in the nail. Contrary to what some people believe, this does not reduce the tendency for the nail to curve downward.  Do not repeatedly trim nail borders. Repeated trimming does not change the way the nail grows and can make the condition worse.  Do not place cotton under the nail. Not only does this not relieve the pain, it provides a place for harmful bacteria to grow, resulting in infection.  Over-the-counter medications are ineffective. Topical medications may mask  the pain, but they do not correct the underlying problem.        Understanding Ingrown Toenails    An ingrown nail is the result of a nail growing into the skin that surrounds it. This often occurs at either edge of the big toe. Ingrown nails may be caused by improper trimming, inherited nail deformities, injuries, fungal infections, or pressure.  Symptoms  Ingrown nails may cause pain at the tip of the toe or all the way to the base of the toe. The pain is often worse while walking. An ingrown nail may also lead to infection, inflammation, or a more serious condition. If its infected, you might see pus or redness.  Evaluation  To determine the extent of your problem, your healthcare provider examines and possibly presses the painful area. If other problems are suspected, blood tests, cultures, and X-rays may be done as well.  Treatment  If the nail isnt infected, your healthcare provider may trim the corner of it to help relieve your symptoms. He or she may need to remove one side of your nail back to the cuticle. The base of the nail may then be treated with a chemical to keep the ingrown part from growing back. Severe infections or ingrown nails may require antibiotics and temporary or permanent removal of a portion of the nail. To prevent pain, a local anesthetic may be used in these procedures. This treatment is usually done at your healthcare provider's office.  Prevention  Many nail problems can be prevented by wearing the right shoes and trimming your nails properly. To help avoid infection, keep your feet clean and dry. If you have diabetes, talk with your healthcare provider before doing any foot self-care.  The right shoes: Get your feet measured (your size may change as you age). Wear shoes that are supportive and roomy enough for your toes to wiggle. Look for shoes made of natural materials such as leather, which allow your feet to breathe.  Proper trimming: To avoid problems, trim your toenails  straight across without cutting down into the corners. If you cant trim your own nails, ask your healthcare provider to do so for you.  Date Last Reviewed: 10/1/2016  © 4848-6987 [employer]. [address] [place] [address], [place] [address]. All rights reserved. This information is not intended as a substitute for professional medical care. Always follow your healthcare professional's instructions.

## 2025-04-30 NOTE — PROGRESS NOTES
[facility] - PODIATRY  [address] [facility]  [address]  [place] [address]  Dept: [phone]  Dept Fax: [phone]    Chidi Martin Jr., DPM     Assessment:   MDM    Coding  1. Ingrown toenail of right foot  Nail Removal      2. Ingrown toenail of left foot  Nail Removal          Plan:     Nail Removal    Date/Time: 4/30/2025 3:12 PM    Performed by: Chidi Martin Jr., DPM  Authorized by: Chidi Martin Jr., DPM    Consent Done?:  Yes (Verbal)  Time out: Immediately prior to the procedure a time out was called    Prep: patient was prepped and draped in usual sterile fashion    Location:     Location:  Right foot    Location detail:  Right big toe  Anesthesia:     Anesthesia:  Digital block    Local anesthetic:  Bupivacaine 0.5% without epinephrine    Anesthetic total (ml):  4  Procedure Details:     Preparation:  Skin prepped with alcohol, skin prepped with Betadine and sterile field established    Amount removed:  Complete    Wedge excision of skin of nail fold: Yes      Nail bed sutured?: No      Nail matrix removed:  None    Removed nail replaced and anchored: No      Dressing applied:  4x4, antibiotic ointment, gauze roll, petrolatum-impregnated gauze and dressing applied    Patient tolerance:  Patient tolerated the procedure well with no immediate complications      Raquel was seen today for ingrown toenail.    Diagnoses and all orders for this visit:    Ingrown toenail of right foot  -     Nail Removal    Ingrown toenail of left foot  -     Nail Removal        -pt seen, evaluated, and managed  -dx discussed in detail. All questions/concerns addressed  -all tx options discussed. All alternatives, risks, benefits of all txs discussed  -The patient was educated regarding the above diagnosis.   -discussed ingrowing toenails and all tx options. Pt opts for avulsion R  -offered b/l avulsion but pt delclines  -pt to perform epsom salt or betadine soaks once daily x  2wks. Written instructions dispensed  -keep toe covered with triple abx ointment + bandaid x 2wks    Rx dispensed: none  Referrals: none  -WB: wbat      Follow up in about 4 weeks (around 5/28/2025).    Subjective:      Patient ID: Raquel Finch is a 60 y.o. female.    Chief Complaint:   Chief Complaint   Patient presents with    Ingrown Toenail       CC - ingrown nail: Patient presents to the clinic complaining of painful ingrown toenail on both feet. Patients rates pain 7/10 on pain scale. patient seeking tx options.    Ingrown Toenail        Last Podiatry Enc: Visit date not found  Last Enc w/ Me: 1/24/2023    Outside reports reviewed: historical medical records.  Family hx: as below  Past Medical History:   Diagnosis Date    Anemia     Arthritis     Asthma     Colon polyps 04/2012    Elevated CPK     Encounter for blood transfusion     Hypertension     Kidney stones     Osteoporosis     Personal history of colonic polyps 01/24/2023    PUD (peptic ulcer disease)      Past Surgical History:   Procedure Laterality Date    COLONOSCOPY N/A 12/13/2017    Procedure: COLONOSCOPY;  Surgeon: Laxmi Crow MD;  Location: [facility];  Service: Endoscopy;  Laterality: N/A;    COLONOSCOPY N/A 1/24/2023    Procedure: COLONOSCOPY;  Surgeon: Lima Rojas MD;  Location: [facility];  Service: Endoscopy;  Laterality: N/A;    COLONOSCOPY W/ POLYPECTOMY      COLONOSCOPY W/ POLYPECTOMY      CYSTOSCOPY W/ STONE MANIPULATION Right 03/02/2017    DILATION AND CURETTAGE OF UTERUS      ESOPHAGOGASTRODUODENOSCOPY      ESOPHAGOGASTRODUODENOSCOPY      ESOPHAGOGASTRODUODENOSCOPY N/A 5/25/2023    Procedure: EGD (ESOPHAGOGASTRODUODENOSCOPY);  Surgeon: Lima Rojas MD;  Location: [facility];  Service: Endoscopy;  Laterality: N/A;    TUBAL LIGATION       Family History   Problem Relation Name Age of Onset    Hypertension Mother      Heart disease Mother      Hypertension Father      Stroke Father       Current Medications[1]  Review  of patient's allergies indicates:  No Known Allergies  Social History[2]    ROS    REVIEW OF SYSTEMS: Negative as documented below as well as positive findings in bold.       Constitutional  Respiratory  Gastrointestinal  Skin   - Fever - Cough - Heartburn - Rash   - Chills - Spit blood - Nausea - Itching   - Weight Loss - Shortness of breath - Vomiting - Nail pain   - Malaise/Fatigue - Wheezing - Abdominal Pain  Wound/Ulcer   - Weight Gain   - Blood in Stool  Poor wound healing       - Diarrhea          Cardiovascular  Genitourinary  Neurological  HEENT   - Chest Pain - Dysuria - Burning Sensation of feet - Headache   - Palpitations - Hematuria - Tingling / Paresthesia - Congestion   - Pain at night in legs - Flank Pain - Dizziness - Sore Throat   - Cramping   - Tremor - Blurred Vision   - Leg Swelling   - Sensory Change - Double Vision   - Dizzy when standing   - Speech Change - Eye Redness       - Focal Weakness - Dry Eyes       - Loss of Consciousness          Endocrine  Musculoskeletal  Psychiatric   - Cold intolerance - Muscle Pain - Depression   - Heat intolerance - Neck Pain - Insomnia   - Anemia - Joint Pain - Memory Loss   -  Easy bruising, bleeding - Heel pain - Anxiety      Toe Pain        Leg/Ankle/Foot Pain         Objective:     Wt 96.6 kg (212 lb 13.7 oz)   BMI 32.36 kg/m²   Vitals:    04/30/25 1449   Weight: 96.6 kg (212 lb 13.7 oz)   PainSc:   2       Physical Exam    General Appearance:   Patient appears well developed, well nourished  Patient appears stated age    Psychiatric:   Patient is oriented to time, place, and person.  Patient has appropriate mood and affect    Neck:  Trachea Midline  No visible masses    Respiratory/Ears:  No distress or labored breathing.  Able to differentiate between normal talking voice and whisper.  Able to follow commands    Eyes:  Visual Acuity intact  Lids and conjunctivae normal. No discoloration noted.    Foot Exam  Physical Exam  Ortho Exam  Ortho/SPM  Exam  Physical Exam  Neurological Exam    B/l LE exam con't:  V:  DP 2/4, PT 2/4   CRT< 3s to all digits tested   Tibial and popliteal lymph nodes are w/o abnormality    N:  Patient displays normal ankle reflexes   SILT in SP/DP/T/Dora/Saph distributions    Ortho: +Motor EHL/FHL/TA/GA   +TTP b/l great toe  Compartments soft/compressible. No pain on passive stretch of big toe. No calf  Pain.    Derm:  skin intact, skin warm and dry, skin without ulcers or lesions, skin without induration, left, right, great toe ingrowing and incurvated     Imaging / Labs:      No results found.      Note: This was dictated using a computer transcription program. Although proofread, it may contain computer transcription errors and phonetic errors. Other human proofreading errors may also exist. Corrections may be performed at a later time. Please contact us for any clarification if needed.    Chidi Martin DPM  [facility]         [1]   Current Outpatient Medications   Medication Sig Dispense Refill    albuterol (PROVENTIL/VENTOLIN HFA) 90 mcg/actuation inhaler 2 puffs 4 (four) times daily as needed.      ALPRAZolam (XANAX) 0.5 MG tablet Take 0.5 mg by mouth 2 (two) times daily as needed.      aspirin 81 MG Chew Take 81 mg by mouth once daily.      atorvastatin (LIPITOR) 20 MG tablet TK 1 T PO  QHS      budesonide-formoterol 160-4.5 mcg (SYMBICORT) 160-4.5 mcg/actuation HFAA 2 puffs as needed.      cetirizine (ZYRTEC) 10 MG tablet TAKE 1 TABLET BY MOUTH DAILY AS NEEDED FOR SINUS      fluticasone propionate (FLOVENT HFA) 220 mcg/actuation inhaler 2 puffs as needed.      gabapentin (NEURONTIN) 300 MG capsule Take 300 mg by mouth nightly.      hydroCHLOROthiazide (MICROZIDE) 12.5 mg capsule Take 12.5 mg by mouth daily as needed.      ibandronate (BONIVA) 150 mg tablet Take 150 mg by mouth every 30 days.      ibuprofen (ADVIL,MOTRIN) 600 MG tablet Take 1 tablet (600 mg total) by mouth every 6 (six) hours as  needed. 20 tablet 0    inulin (FIBER GUMMIES ORAL) Take by mouth.      meclizine (ANTIVERT) 25 mg tablet Take 1 tablet (25 mg total) by mouth 3 (three) times daily as needed for Dizziness. 30 tablet 0    montelukast (SINGULAIR) 10 mg tablet Take 10 mg by mouth every evening.      nebulizer and compressor Domonique as per administration instructions      ondansetron (ZOFRAN) 4 MG tablet Take 1 tablet (4 mg total) by mouth every 6 (six) hours. 12 tablet 0    pantoprazole (PROTONIX) 40 MG tablet Take 40 mg by mouth once daily.      paroxetine (PAXIL) 40 MG tablet Take 40 mg by mouth once daily.      tiZANidine (ZANAFLEX) 4 MG tablet Take by mouth every 8 (eight) hours as needed.      fluticasone propionate (FLONASE) 50 mcg/actuation nasal spray 2 sprays (100 mcg total) by Each Nostril route once daily. (Patient not taking: Reported on 4/30/2025) 18.2 mL 11    HYDROcodone-acetaminophen (NORCO) 5-325 mg per tablet Take 1 tablet by mouth every 6 (six) hours as needed for Pain. (Patient not taking: Reported on 4/30/2025) 12 tablet 0    methocarbamoL (ROBAXIN) 500 MG Tab Take 1 tablet (500 mg total) by mouth 3 (three) times daily as needed (Pain/muscle spasm). (Patient not taking: Reported on 4/30/2025) 15 tablet 0     No current facility-administered medications for this visit.   [2]   Social History  Socioeconomic History    Marital status:    Tobacco Use    Smoking status: Never    Smokeless tobacco: Never   Substance and Sexual Activity    Alcohol use: No     Comment: rarely    Drug use: No    Sexual activity: Yes     Partners: Male     Social Drivers of Health     Financial Resource Strain: Low Risk  (1/11/2025)    Overall Financial Resource Strain (CARDIA)     Difficulty of Paying Living Expenses: Not hard at all   Food Insecurity: Food Insecurity Present (1/11/2025)    Hunger Vital Sign     Worried About Running Out of Food in the Last Year: Never true     Ran Out of Food in the Last Year: Sometimes true    Transportation Needs: No Transportation Needs (1/7/2025)    Received from [facility]    PRAPARE - Transportation     Lack of Transportation (Medical): No     Lack of Transportation (Non-Medical): No   Physical Activity: Insufficiently Active (1/11/2025)    Exercise Vital Sign     Days of Exercise per Week: 1 day     Minutes of Exercise per Session: 10 min   Stress: No Stress Concern Present (1/11/2025)    [employer] [place] [employer] - Occupational Stress Questionnaire     Feeling of Stress : Not at all   Housing Stability: Unknown (1/11/2025)    Housing Stability Vital Sign     Unable to Pay for Housing in the Last Year: No

## 2025-05-26 ENCOUNTER — OFFICE VISIT (OUTPATIENT)
Dept: PODIATRY | Facility: CLINIC | Age: 60
End: 2025-05-26
Payer: MEDICAID

## 2025-05-26 VITALS — BODY MASS INDEX: 32.27 KG/M2 | WEIGHT: 212.94 LBS | HEIGHT: 68 IN

## 2025-05-26 DIAGNOSIS — L60.0 INGROWN TOENAIL OF LEFT FOOT: Primary | ICD-10-CM

## 2025-05-26 DIAGNOSIS — L60.0 INGROWN TOENAIL OF RIGHT FOOT: ICD-10-CM

## 2025-05-26 PROCEDURE — 11765 WEDGE EXCISION SKN NAIL FOLD: CPT | Mod: PBBFAC,PN | Performed by: PODIATRIST

## 2025-05-26 PROCEDURE — 99214 OFFICE O/P EST MOD 30 MIN: CPT | Mod: PBBFAC,PN | Performed by: PODIATRIST

## 2025-05-26 PROCEDURE — 99999 PR PBB SHADOW E&M-EST. PATIENT-LVL IV: CPT | Mod: PBBFAC,,, | Performed by: PODIATRIST

## 2025-05-26 NOTE — PATIENT INSTRUCTIONS
Instructions for Care after Ingrown Nail removal    General Information: Stay off your feet as much as possible today. You may wear a surgical shoe, sandal or any open toed shoe that does not squeeze, constrict or put pressure on your toe(s). Your toe(s) may remain numb for up to 2-24 hours after the procedure. Although most patients can wear a closed loose fitting shoe after the first week, the toe will heal faster the more you use the open toed shoe in the first 2-3  weeks. Please contact our office if you have any questions or concerns.    Bleeding: Slight bleeding, discoloration and drainage are normal. Due to the chemical used there may be some yellow-clear drainage coming from the toe for 2-3 weeks.    Discomfort: You can elevate your foot to help alleviate minor swelling, bleeding and discomfort. You may also take Advil, Tylenol or other over the counter pain medications to help alleviate pain. Call our office if the pain is not well controlled. Most patients have very little discomfort as long as they minimize their walking for the first 24 hours and do not bump the toe.    Removing the Bandage: Starting the day after the procedure, carefully remove the dressing and shower or bathe as normal. It is Ok to get the bandage soaking wet in the shower and when you remove it, it should not stick to the surgery site.    Dressing Options- Traditional Method:  1. Soaking two times a day in WARM water with Epsom salts or diluted Povidone Iodine  (Betadine) for 15-20 minutes. You will need to purchase these products from the pharmacy.  2. Dry toe then apply an antibiotic cream or ointment such as Neosporin or Polysporin plus or  Garamycin and cover with a 2 x 2 inch size gauze and then secure with a 1 inch band aid.  3. In the second week, take the dressing off at bedtime to air dry the toe.  4. If the toe is infected take the Antibiotic Pills as directed until finished.      Ingrown Toenail        What Is an Ingrown  Winlevi Pregnancy And Lactation Text: This medication is considered safe during pregnancy and breastfeeding. Propranolol Counseling:  I discussed with the patient the risks of propranolol including but not limited to low heart rate, low blood pressure, low blood sugar, restlessness and increased cold sensitivity. They should call the office if they experience any of these side effects. Toenail?    When a toenail is ingrown, it is curved and grows into the skin, usually at the nail borders (the sides of the nail). This digging in of the nail irritates the skin, often creating pain, redness, swelling and warmth in the toe.    If an ingrown nail causes a break in the skin, bacteria may enter and cause an infection in the area, which is often marked by drainage and a foul odor. However, even if the toe is not painful, red, swollen or warm, a nail that curves downward into the skin can progress to an infection.    Ingrown toenail and normal toenail    Causes  Causes of ingrown toenails include:    Heredity. In many people, the tendency for ingrown toenails is inherited.  Trauma. Sometimes an ingrown toenail is the result of trauma, such as stubbing your toe, having an object fall on your toe or engaging in activities that involve repeated pressure on the toes, such as kicking or running.  Improper trimming. The most common cause of ingrown toenails is cutting your nails too short. This encourages the skin next to the nail to fold over the nail.   Improperly sized footwear. Ingrown toenails can result from wearing socks and shoes that are tight or short.  Nail conditions. Ingrown toenails can be caused by nail problems, such as fungal infections or losing a nail due to trauma.     Treatment  Sometimes initial treatment for ingrown toenails can be safely performed at home. However, home treatment is strongly discouraged if an infection is suspected or for those who have medical conditions that put feet at high risk, such as diabetes, nerve damage in the foot or poor circulation.        Ingrown toenail before and after treatment    Home Care  If you do not have an infection or any of the above medical conditions, you can soak your foot in room-temperature water (adding Epsom salt may be recommended by your doctor) and gently massage the side of the nail fold to help reduce the inflammation.    Avoid  Erythromycin Pregnancy And Lactation Text: This medication is Pregnancy Category B and is considered safe during pregnancy. It is also excreted in breast milk. attempting bathroom surgery. Repeated cutting of the nail can cause the condition to worsen over time. If your symptoms fail to improve, it is time to see a foot and ankle surgeon.    Physician Care  After examining the toe, the foot and ankle surgeon will select the treatment best suited for you. If an infection is present, an oral antibiotic may be prescribed.    Sometimes a minor surgical procedure, often performed in the office, will ease the pain and remove the offending nail. After applying a local anesthetic, the doctor removes part of the nails side border. Some nails may become ingrown again, requiring removal of the nail root.    Following the nail procedure, a light bandage will be applied. Most people experience very little pain after surgery and may resume normal activity the next day. If your surgeon has prescribed an oral antibiotic, be sure to take all the medication, even if your symptoms have improved.    Preventing Ingrown Toenails  Many cases of ingrown toenails may be prevented by:    Proper trimming. Cut toenails in a fairly straight line, and do not cut them too short. You should be able to get your fingernail under the sides and end of the nail.  Well-fitting shoes and socks. Do not wear shoes that are short or tight in the toe area. Avoid shoes that are loose because they too cause pressure on the toes, especially when running or walking briskly.               What You Should Know About Home Treatment  Do not cut a notch in the nail. Contrary to what some people believe, this does not reduce the tendency for the nail to curve downward.  Do not repeatedly trim nail borders. Repeated trimming does not change the way the nail grows and can make the condition worse.  Do not place cotton under the nail. Not only does this not relieve the pain, it provides a place for harmful bacteria to grow, resulting in infection.  Over-the-counter medications are ineffective. Topical medications may mask  Protopic Pregnancy And Lactation Text: This medication is Pregnancy Category C. It is unknown if this medication is excreted in breast milk when applied topically. the pain, but they do not correct the underlying problem.        Understanding Ingrown Toenails    An ingrown nail is the result of a nail growing into the skin that surrounds it. This often occurs at either edge of the big toe. Ingrown nails may be caused by improper trimming, inherited nail deformities, injuries, fungal infections, or pressure.  Symptoms  Ingrown nails may cause pain at the tip of the toe or all the way to the base of the toe. The pain is often worse while walking. An ingrown nail may also lead to infection, inflammation, or a more serious condition. If its infected, you might see pus or redness.  Evaluation  To determine the extent of your problem, your healthcare provider examines and possibly presses the painful area. If other problems are suspected, blood tests, cultures, and X-rays may be done as well.  Treatment  If the nail isnt infected, your healthcare provider may trim the corner of it to help relieve your symptoms. He or she may need to remove one side of your nail back to the cuticle. The base of the nail may then be treated with a chemical to keep the ingrown part from growing back. Severe infections or ingrown nails may require antibiotics and temporary or permanent removal of a portion of the nail. To prevent pain, a local anesthetic may be used in these procedures. This treatment is usually done at your healthcare provider's office.  Prevention  Many nail problems can be prevented by wearing the right shoes and trimming your nails properly. To help avoid infection, keep your feet clean and dry. If you have diabetes, talk with your healthcare provider before doing any foot self-care.  The right shoes: Get your feet measured (your size may change as you age). Wear shoes that are supportive and roomy enough for your toes to wiggle. Look for shoes made of natural materials such as leather, which allow your feet to breathe.  Proper trimming: To avoid problems, trim your toenails  Ilumya Counseling: I discussed with the patient the risks of tildrakizumab including but not limited to immunosuppression, malignancy, posterior leukoencephalopathy syndrome, and serious infections.  The patient understands that monitoring is required including a PPD at baseline and must alert us or the primary physician if symptoms of infection or other concerning signs are noted. straight across without cutting down into the corners. If you cant trim your own nails, ask your healthcare provider to do so for you.  Date Last Reviewed: 10/1/2016  © 7188-9607 [employer]. [address] [place] [address], [place] [address]. All rights reserved. This information is not intended as a substitute for professional medical care. Always follow your healthcare professional's instructions.       Finasteride Male Counseling: Finasteride Counseling:  I discussed with the patient the risks of use of finasteride including but not limited to decreased libido, decreased ejaculate volume, gynecomastia, and depression. Women should not handle medication.  All of the patient's questions and concerns were addressed. 5-Fu Counseling: 5-Fluorouracil Counseling:  I discussed with the patient the risks of 5-fluorouracil including but not limited to erythema, scaling, itching, weeping, crusting, and pain. Itraconazole Counseling:  I discussed with the patient the risks of itraconazole including but not limited to liver damage, nausea/vomiting, neuropathy, and severe allergy.  The patient understands that this medication is best absorbed when taken with acidic beverages such as non-diet cola or ginger ale.  The patient understands that monitoring is required including baseline LFTs and repeat LFTs at intervals.  The patient understands that they are to contact us or the primary physician if concerning signs are noted. Oxybutynin Pregnancy And Lactation Text: This medication is Pregnancy Category B and is considered safe during pregnancy. It is unknown if it is excreted in breast milk. Xeljanz Counseling: I discussed with the patient the risks of Xeljanz therapy including increased risk of infection, liver issues, headache, diarrhea, or cold symptoms. Live vaccines should be avoided. They were instructed to call if they have any problems. Zyclara Counseling:  I discussed with the patient the risks of imiquimod including but not limited to erythema, scaling, itching, weeping, crusting, and pain.  Patient understands that the inflammatory response to imiquimod is variable from person to person and was educated regarded proper titration schedule.  If flu-like symptoms develop, patient knows to discontinue the medication and contact us. Include Pregnancy/Lactation Warning?: No Ketoconazole Counseling:   Patient counseled regarding improving absorption with orange juice.  Adverse effects include but are not limited to breast enlargement, headache, diarrhea, nausea, upset stomach, liver function test abnormalities, taste disturbance, and stomach pain.  There is a rare possibility of liver failure that can occur when taking ketoconazole. The patient understands that monitoring of LFTs may be required, especially at baseline. The patient verbalized understanding of the proper use and possible adverse effects of ketoconazole.  All of the patient's questions and concerns were addressed. Xolair Counseling:  Patient informed of potential adverse effects including but not limited to fever, muscle aches, rash and allergic reactions.  The patient verbalized understanding of the proper use and possible adverse effects of Xolair.  All of the patient's questions and concerns were addressed. Winlevi Counseling:  I discussed with the patient the risks of topical clascoterone including but not limited to erythema, scaling, itching, and stinging. Patient voiced their understanding. 5-Fu Pregnancy And Lactation Text: This medication is Pregnancy Category X and contraindicated in pregnancy and in women who may become pregnant. It is unknown if this medication is excreted in breast milk. Propranolol Pregnancy And Lactation Text: This medication is Pregnancy Category C and it isn't known if it is safe during pregnancy. It is excreted in breast milk. Acitretin Counseling:  I discussed with the patient the risks of acitretin including but not limited to hair loss, dry lips/skin/eyes, liver damage, hyperlipidemia, depression/suicidal ideation, photosensitivity.  Serious rare side effects can include but are not limited to pancreatitis, pseudotumor cerebri, bony changes, clot formation/stroke/heart attack.  Patient understands that alcohol is contraindicated since it can result in liver toxicity and significantly prolong the elimination of the drug by many years. Protopic Counseling: Patient may experience a mild burning sensation during topical application. Protopic is not approved in children less than 2 years of age. There have been case reports of hematologic and skin malignancies in patients using topical calcineurin inhibitors although causality is questionable. Metronidazole Counseling:  I discussed with the patient the risks of metronidazole including but not limited to seizures, nausea/vomiting, a metallic taste in the mouth, nausea/vomiting and severe allergy. Ilumya Pregnancy And Lactation Text: The risk during pregnancy and breastfeeding is uncertain with this medication. Erivedge Pregnancy And Lactation Text: This medication is Pregnancy Category X and is absolutely contraindicated during pregnancy. It is unknown if it is excreted in breast milk. Itraconazole Pregnancy And Lactation Text: This medication is Pregnancy Category C and it isn't know if it is safe during pregnancy. It is also excreted in breast milk. Xeldalez Pregnancy And Lactation Text: This medication is Pregnancy Category D and is not considered safe during pregnancy.  The risk during breast feeding is also uncertain. Ketoconazole Pregnancy And Lactation Text: This medication is Pregnancy Category C and it isn't know if it is safe during pregnancy. It is also excreted in breast milk and breast feeding isn't recommended. Gabapentin Counseling: I discussed with the patient the risks of gabapentin including but not limited to dizziness, somnolence, fatigue and ataxia. Acitretin Pregnancy And Lactation Text: This medication is Pregnancy Category X and should not be given to women who are pregnant or may become pregnant in the future. This medication is excreted in breast milk. Xolair Pregnancy And Lactation Text: This medication is Pregnancy Category B and is considered safe during pregnancy. This medication is excreted in breast milk. Birth Control Pills Counseling: Birth Control Pill Counseling: I discussed with the patient the potential side effects of OCPs including but not limited to increased risk of stroke, heart attack, thrombophlebitis, deep venous thrombosis, hepatic adenomas, breast changes, GI upset, headaches, and depression.  The patient verbalized understanding of the proper use and possible adverse effects of OCPs. All of the patient's questions and concerns were addressed. Drysol Counseling:  I discussed with the patient the risks of drysol/aluminum chloride including but not limited to skin rash, itching, irritation, burning. Metronidazole Pregnancy And Lactation Text: This medication is Pregnancy Category B and considered safe during pregnancy.  It is also excreted in breast milk. Solaraze Counseling:  I discussed with the patient the risks of Solaraze including but not limited to erythema, scaling, itching, weeping, crusting, and pain. Infliximab Counseling:  I discussed with the patient the risks of infliximab including but not limited to myelosuppression, immunosuppression, autoimmune hepatitis, demyelinating diseases, lymphoma, and serious infections.  The patient understands that monitoring is required including a PPD at baseline and must alert us or the primary physician if symptoms of infection or other concerning signs are noted. Minocycline Pregnancy And Lactation Text: This medication is Pregnancy Category D and not consider safe during pregnancy. It is also excreted in breast milk. Rituxan Counseling:  I discussed with the patient the risks of Rituxan infusions. Side effects can include infusion reactions, severe drug rashes including mucocutaneous reactions, reactivation of latent hepatitis and other infections and rarely progressive multifocal leukoencephalopathy.  All of the patient's questions and concerns were addressed. Rhofade Counseling: Rhofade is a topical medication which can decrease superficial blood flow where applied. Side effects are uncommon and include stinging, redness and allergic reactions. Cimzia Counseling:  I discussed with the patient the risks of Cimzia including but not limited to immunosuppression, allergic reactions and infections.  The patient understands that monitoring is required including a PPD at baseline and must alert us or the primary physician if symptoms of infection or other concerning signs are noted. Finasteride Pregnancy And Lactation Text: This medication is absolutely contraindicated during pregnancy. It is unknown if it is excreted in breast milk. Bexarotene Counseling:  I discussed with the patient the risks of bexarotene including but not limited to hair loss, dry lips/skin/eyes, liver abnormalities, hyperlipidemia, pancreatitis, depression/suicidal ideation, photosensitivity, drug rash/allergic reactions, hypothyroidism, anemia, leukopenia, infection, cataracts, and teratogenicity.  Patient understands that they will need regular blood tests to check lipid profile, liver function tests, white blood cell count, thyroid function tests and pregnancy test if applicable. Zyclara Pregnancy And Lactation Text: This medication is Pregnancy Category C. It is unknown if this medication is excreted in breast milk. Drysol Pregnancy And Lactation Text: This medication is considered safe during pregnancy and breast feeding. Birth Control Pills Pregnancy And Lactation Text: This medication should be avoided if pregnant and for the first 30 days post-partum. Minocycline Counseling: Patient advised regarding possible photosensitivity and discoloration of the teeth, skin, lips, tongue and gums.  Patient instructed to avoid sunlight, if possible.  When exposed to sunlight, patients should wear protective clothing, sunglasses, and sunscreen.  The patient was instructed to call the office immediately if the following severe adverse effects occur:  hearing changes, easy bruising/bleeding, severe headache, or vision changes.  The patient verbalized understanding of the proper use and possible adverse effects of minocycline.  All of the patient's questions and concerns were addressed. Infliximab Pregnancy And Lactation Text: This medication is Pregnancy Category B and is considered safe during pregnancy. It is unknown if this medication is excreted in breast milk. Rhofade Pregnancy And Lactation Text: This medication has not been assigned a Pregnancy Risk Category. It is unknown if the medication is excreted in breast milk. Opioid Pregnancy And Lactation Text: These medications can lead to premature delivery and should be avoided during pregnancy. These medications are also present in breast milk in small amounts. Glycopyrrolate Counseling:  I discussed with the patient the risks of glycopyrrolate including but not limited to skin rash, drowsiness, dry mouth, difficulty urinating, and blurred vision. Terbinafine Pregnancy And Lactation Text: This medication is Pregnancy Category B and is considered safe during pregnancy. It is also excreted in breast milk and breast feeding isn't recommended. Rituxan Pregnancy And Lactation Text: This medication is Pregnancy Category C and it isn't know if it is safe during pregnancy. It is unknown if this medication is excreted in breast milk but similar antibodies are known to be excreted. Topical Retinoid counseling:  Patient advised to apply a pea-sized amount only at bedtime and wait 30 minutes after washing their face before applying.  If too drying, patient may add a non-comedogenic moisturizer. The patient verbalized understanding of the proper use and possible adverse effects of retinoids.  All of the patient's questions and concerns were addressed. Cimzia Pregnancy And Lactation Text: This medication crosses the placenta but can be considered safe in certain situations. Cimzia may be excreted in breast milk. Bexarotene Pregnancy And Lactation Text: This medication is Pregnancy Category X and should not be given to women who are pregnant or may become pregnant. This medication should not be used if you are breast feeding. Eucrisa Counseling: Patient may experience a mild burning sensation during topical application. Eucrisa is not approved in children less than 2 years of age. Spironolactone Counseling: Patient advised regarding risks of diarrhea, abdominal pain, hyperkalemia, birth defects (for female patients), liver toxicity and renal toxicity. The patient may need blood work to monitor liver and kidney function and potassium levels while on therapy. The patient verbalized understanding of the proper use and possible adverse effects of spironolactone.  All of the patient's questions and concerns were addressed. Terbinafine Counseling: Patient counseling regarding adverse effects of terbinafine including but not limited to headache, diarrhea, rash, upset stomach, liver function test abnormalities, itching, taste/smell disturbance, nausea, abdominal pain, and flatulence.  There is a rare possibility of liver failure that can occur when taking terbinafine.  The patient understands that a baseline LFT and kidney function test may be required. The patient verbalized understanding of the proper use and possible adverse effects of terbinafine.  All of the patient's questions and concerns were addressed. Gabapentin Pregnancy And Lactation Text: This medication is Pregnancy Category C and isn't considered safe during pregnancy. It is excreted in breast milk. Siliq Counseling:  I discussed with the patient the risks of Siliq including but not limited to new or worsening depression, suicidal thoughts and behavior, immunosuppression, malignancy, posterior leukoencephalopathy syndrome, and serious infections.  The patient understands that monitoring is required including a PPD at baseline and must alert us or the primary physician if symptoms of infection or other concerning signs are noted. There is also a special program designed to monitor depression which is required with Siliq. Elidel Counseling: Patient may experience a mild burning sensation during topical application. Elidel is not approved in children less than 2 years of age. There have been case reports of hematologic and skin malignancies in patients using topical calcineurin inhibitors although causality is questionable. SSKI Counseling:  I discussed with the patient the risks of SSKI including but not limited to thyroid abnormalities, metallic taste, GI upset, fever, headache, acne, arthralgias, paraesthesias, lymphadenopathy, easy bleeding, arrhythmias, and allergic reaction. Opioid Counseling: I discussed with the patient the potential side effects of opioids including but not limited to addiction, altered mental status, and depression. I stressed avoiding alcohol, benzodiazepines, muscle relaxants and sleep aids unless specifically okayed by a physician. The patient verbalized understanding of the proper use and possible adverse effects of opioids. All of the patient's questions and concerns were addressed. They were instructed to flush the remaining pills down the toilet if they did not need them for pain. Solaraze Pregnancy And Lactation Text: This medication is Pregnancy Category B and is considered safe. There is some data to suggest avoiding during the third trimester. It is unknown if this medication is excreted in breast milk. Cosentyx Counseling:  I discussed with the patient the risks of Cosentyx including but not limited to worsening of Crohn's disease, immunosuppression, allergic reactions and infections.  The patient understands that monitoring is required including a PPD at baseline and must alert us or the primary physician if symptoms of infection or other concerning signs are noted. Isotretinoin Counseling: Patient should get monthly blood tests, not donate blood, not drive at night if vision affected, not share medication, and not undergo elective surgery for 6 months after tx completed. Side effects reviewed, pt to contact office should one occur. Quinolones Counseling:  I discussed with the patient the risks of fluoroquinolones including but not limited to GI upset, allergic reaction, drug rash, diarrhea, dizziness, photosensitivity, yeast infections, liver function test abnormalities, tendonitis/tendon rupture. Spironolactone Pregnancy And Lactation Text: This medication can cause feminization of the male fetus and should be avoided during pregnancy. The active metabolite is also found in breast milk. High Dose Vitamin A Counseling: Side effects reviewed, pt to contact office should one occur. Arava Counseling:  Patient counseled regarding adverse effects of Arava including but not limited to nausea, vomiting, abnormalities in liver function tests. Patients may develop mouth sores, rash, diarrhea, and abnormalities in blood counts. The patient understands that monitoring is required including LFTs and blood counts.  There is a rare possibility of scarring of the liver and lung problems that can occur when taking methotrexate. Persistent nausea, loss of appetite, pale stools, dark urine, cough, and shortness of breath should be reported immediately. Patient advised to discontinue Arava treatment and consult with a physician prior to attempting conception. The patient will have to undergo a treatment to eliminate Arava from the body prior to conception. Rifampin Counseling: I discussed with the patient the risks of rifampin including but not limited to liver damage, kidney damage, red-orange body fluids, nausea/vomiting and severe allergy. Cimetidine Counseling:  I discussed with the patient the risks of Cimetidine including but not limited to gynecomastia, headache, diarrhea, nausea, drowsiness, arrhythmias, pancreatitis, skin rashes, psychosis, bone marrow suppression and kidney toxicity. Hydroquinone Counseling:  Patient advised that medication may result in skin irritation, lightening (hypopigmentation), dryness, and burning.  In the event of skin irritation, the patient was advised to reduce the amount of the drug applied or use it less frequently.  Rarely, spots that are treated with hydroquinone can become darker (pseudoochronosis).  Should this occur, patient instructed to stop medication and call the office. The patient verbalized understanding of the proper use and possible adverse effects of hydroquinone.  All of the patient's questions and concerns were addressed. Tazorac Counseling:  Patient advised that medication is irritating and drying.  Patient may need to apply sparingly and wash off after an hour before eventually leaving it on overnight.  The patient verbalized understanding of the proper use and possible adverse effects of tazorac.  All of the patient's questions and concerns were addressed. Azathioprine Counseling:  I discussed with the patient the risks of azathioprine including but not limited to myelosuppression, immunosuppression, hepatotoxicity, lymphoma, and infections.  The patient understands that monitoring is required including baseline LFTs, Creatinine, possible TPMP genotyping and weekly CBCs for the first month and then every 2 weeks thereafter.  The patient verbalized understanding of the proper use and possible adverse effects of azathioprine.  All of the patient's questions and concerns were addressed. Hydroxychloroquine Pregnancy And Lactation Text: This medication has been shown to cause fetal harm but it isn't assigned a Pregnancy Risk Category. There are small amounts excreted in breast milk. Isotretinoin Pregnancy And Lactation Text: This medication is Pregnancy Category X and is considered extremely dangerous during pregnancy. It is unknown if it is excreted in breast milk. Glycopyrrolate Pregnancy And Lactation Text: This medication is Pregnancy Category B and is considered safe during pregnancy. It is unknown if it is excreted breast milk. Azithromycin Pregnancy And Lactation Text: This medication is considered safe during pregnancy and is also secreted in breast milk. Dupixent Pregnancy And Lactation Text: This medication likely crosses the placenta but the risk for the fetus is uncertain. This medication is excreted in breast milk. Thalidomide Counseling: I discussed with the patient the risks of thalidomide including but not limited to birth defects, anxiety, weakness, chest pain, dizziness, cough and severe allergy. High Dose Vitamin A Pregnancy And Lactation Text: High dose vitamin A therapy is contraindicated during pregnancy and breast feeding. Rifampin Pregnancy And Lactation Text: This medication is Pregnancy Category C and it isn't know if it is safe during pregnancy. It is also excreted in breast milk and should not be used if you are breast feeding. Simponi Counseling:  I discussed with the patient the risks of golimumab including but not limited to myelosuppression, immunosuppression, autoimmune hepatitis, demyelinating diseases, lymphoma, and serious infections.  The patient understands that monitoring is required including a PPD at baseline and must alert us or the primary physician if symptoms of infection or other concerning signs are noted. Eucrisa Pregnancy And Lactation Text: This medication has not been assigned a Pregnancy Risk Category but animal studies failed to show danger with the topical medication. It is unknown if the medication is excreted in breast milk. Azathioprine Pregnancy And Lactation Text: This medication is Pregnancy Category D and isn't considered safe during pregnancy. It is unknown if this medication is excreted in breast milk. Sski Pregnancy And Lactation Text: This medication is Pregnancy Category D and isn't considered safe during pregnancy. It is excreted in breast milk. Azithromycin Counseling:  I discussed with the patient the risks of azithromycin including but not limited to GI upset, allergic reaction, drug rash, diarrhea, and yeast infections. Hydroxychloroquine Counseling:  I discussed with the patient that a baseline ophthalmologic exam is needed at the start of therapy and every year thereafter while on therapy. A CBC may also be warranted for monitoring.  The side effects of this medication were discussed with the patient, including but not limited to agranulocytosis, aplastic anemia, seizures, rashes, retinopathy, and liver toxicity. Patient instructed to call the office should any adverse effect occur.  The patient verbalized understanding of the proper use and possible adverse effects of Plaquenil.  All the patient's questions and concerns were addressed. Dupixent Counseling: I discussed with the patient the risks of dupilumab including but not limited to eye infection and irritation, cold sores, injection site reactions, worsening of asthma, allergic reactions and increased risk of parasitic infection.  Live vaccines should be avoided while taking dupilumab. Dupilumab will also interact with certain medications such as warfarin and cyclosporine. The patient understands that monitoring is required and they must alert us or the primary physician if symptoms of infection or other concerning signs are noted. Doxepin Pregnancy And Lactation Text: This medication is Pregnancy Category C and it isn't known if it is safe during pregnancy. It is also excreted in breast milk and breast feeding isn't recommended. Bactrim Counseling:  I discussed with the patient the risks of sulfa antibiotics including but not limited to GI upset, allergic reaction, drug rash, diarrhea, dizziness, photosensitivity, and yeast infections.  Rarely, more serious reactions can occur including but not limited to aplastic anemia, agranulocytosis, methemoglobinemia, blood dyscrasias, liver or kidney failure, lung infiltrates or desquamative/blistering drug rashes. Topical Clindamycin Counseling: Patient counseled that this medication may cause skin irritation or allergic reactions.  In the event of skin irritation, the patient was advised to reduce the amount of the drug applied or use it less frequently.   The patient verbalized understanding of the proper use and possible adverse effects of clindamycin.  All of the patient's questions and concerns were addressed. Libtayo Pregnancy And Lactation Text: This medication is contraindicated in pregnancy and when breast feeding. Enbrel Counseling:  I discussed with the patient the risks of etanercept including but not limited to myelosuppression, immunosuppression, autoimmune hepatitis, demyelinating diseases, lymphoma, and infections.  The patient understands that monitoring is required including a PPD at baseline and must alert us or the primary physician if symptoms of infection or other concerning signs are noted. Imiquimod Counseling:  I discussed with the patient the risks of imiquimod including but not limited to erythema, scaling, itching, weeping, crusting, and pain.  Patient understands that the inflammatory response to imiquimod is variable from person to person and was educated regarded proper titration schedule.  If flu-like symptoms develop, patient knows to discontinue the medication and contact us. Sarecycline Counseling: Patient advised regarding possible photosensitivity and discoloration of the teeth, skin, lips, tongue and gums.  Patient instructed to avoid sunlight, if possible.  When exposed to sunlight, patients should wear protective clothing, sunglasses, and sunscreen.  The patient was instructed to call the office immediately if the following severe adverse effects occur:  hearing changes, easy bruising/bleeding, severe headache, or vision changes.  The patient verbalized understanding of the proper use and possible adverse effects of sarecycline.  All of the patient's questions and concerns were addressed. Doxepin Counseling:  Patient advised that the medication is sedating and not to drive a car after taking this medication. Patient informed of potential adverse effects including but not limited to dry mouth, urinary retention, and blurry vision.  The patient verbalized understanding of the proper use and possible adverse effects of doxepin.  All of the patient's questions and concerns were addressed. Cellcept Counseling:  I discussed with the patient the risks of mycophenolate mofetil including but not limited to infection/immunosuppression, GI upset, hypokalemia, hypercholesterolemia, bone marrow suppression, lymphoproliferative disorders, malignancy, GI ulceration/bleed/perforation, colitis, interstitial lung disease, kidney failure, progressive multifocal leukoencephalopathy, and birth defects.  The patient understands that monitoring is required including a baseline creatinine and regular CBC testing. In addition, patient must alert us immediately if symptoms of infection or other concerning signs are noted. Hydroxyzine Counseling: Patient advised that the medication is sedating and not to drive a car after taking this medication.  Patient informed of potential adverse effects including but not limited to dry mouth, urinary retention, and blurry vision.  The patient verbalized understanding of the proper use and possible adverse effects of hydroxyzine.  All of the patient's questions and concerns were addressed. Tranexamic Acid Counseling:  Patient advised of the small risk of bleeding problems with tranexamic acid. They were also instructed to call if they developed any nausea, vomiting or diarrhea. All of the patient's questions and concerns were addressed. Cyclophosphamide Counseling:  I discussed with the patient the risks of cyclophosphamide including but not limited to hair loss, hormonal abnormalities, decreased fertility, abdominal pain, diarrhea, nausea and vomiting, bone marrow suppression and infection. The patient understands that monitoring is required while taking this medication. Tazorac Pregnancy And Lactation Text: This medication is not safe during pregnancy. It is unknown if this medication is excreted in breast milk. Bactrim Pregnancy And Lactation Text: This medication is Pregnancy Category D and is known to cause fetal risk.  It is also excreted in breast milk. Libtayo Counseling- I discussed with the patient the risks of Libtayo including but not limited to nausea, vomiting, diarrhea, and bone or muscle pain.  The patient verbalized understanding of the proper use and possible adverse effects of Libtayo.  All of the patient's questions and concerns were addressed. Azelaic Acid Counseling: Patient counseled that medicine may cause skin irritation and to avoid applying near the eyes.  In the event of skin irritation, the patient was advised to reduce the amount of the drug applied or use it less frequently.   The patient verbalized understanding of the proper use and possible adverse effects of azelaic acid.  All of the patient's questions and concerns were addressed. Skyrizi Counseling: I discussed with the patient the risks of risankizumab-rzaa including but not limited to immunosuppression, and serious infections.  The patient understands that monitoring is required including a PPD at baseline and must alert us or the primary physician if symptoms of infection or other concerning signs are noted. Clofazimine Counseling:  I discussed with the patient the risks of clofazimine including but not limited to skin and eye pigmentation, liver damage, nausea/vomiting, gastrointestinal bleeding and allergy. Niacinamide Pregnancy And Lactation Text: These medications are considered safe during pregnancy. Minoxidil Counseling: Minoxidil is a topical medication which can increase blood flow where it is applied. It is uncertain how this medication increases hair growth. Side effects are uncommon and include stinging and allergic reactions. Stelara Counseling:  I discussed with the patient the risks of ustekinumab including but not limited to immunosuppression, malignancy, posterior leukoencephalopathy syndrome, and serious infections.  The patient understands that monitoring is required including a PPD at baseline and must alert us or the primary physician if symptoms of infection or other concerning signs are noted. Hydroxyzine Pregnancy And Lactation Text: This medication is not safe during pregnancy and should not be taken. It is also excreted in breast milk and breast feeding isn't recommended. Cyclophosphamide Pregnancy And Lactation Text: This medication is Pregnancy Category D and it isn't considered safe during pregnancy. This medication is excreted in breast milk. Cephalexin Counseling: I counseled the patient regarding use of cephalexin as an antibiotic for prophylactic and/or therapeutic purposes. Cephalexin (commonly prescribed under brand name Keflex) is a cephalosporin antibiotic which is active against numerous classes of bacteria, including most skin bacteria. Side effects may include nausea, diarrhea, gastrointestinal upset, rash, hives, yeast infections, and in rare cases, hepatitis, kidney disease, seizures, fever, confusion, neurologic symptoms, and others. Patients with severe allergies to penicillin medications are cautioned that there is about a 10% incidence of cross-reactivity with cephalosporins. When possible, patients with penicillin allergies should use alternatives to cephalosporins for antibiotic therapy. Tranexamic Acid Pregnancy And Lactation Text: It is unknown if this medication is safe during pregnancy or breast feeding. Tetracycline Counseling: Patient counseled regarding possible photosensitivity and increased risk for sunburn.  Patient instructed to avoid sunlight, if possible.  When exposed to sunlight, patients should wear protective clothing, sunglasses, and sunscreen.  The patient was instructed to call the office immediately if the following severe adverse effects occur:  hearing changes, easy bruising/bleeding, severe headache, or vision changes.  The patient verbalized understanding of the proper use and possible adverse effects of tetracycline.  All of the patient's questions and concerns were addressed. Patient understands to avoid pregnancy while on therapy due to potential birth defects. Cyclosporine Counseling:  I discussed with the patient the risks of cyclosporine including but not limited to hypertension, gingival hyperplasia,myelosuppression, immunosuppression, liver damage, kidney damage, neurotoxicity, lymphoma, and serious infections. The patient understands that monitoring is required including baseline blood pressure, CBC, CMP, lipid panel and uric acid, and then 1-2 times monthly CMP and blood pressure. Niacinamide Counseling: I recommended taking niacin or niacinamide, also know as vitamin B3, twice daily. Recent evidence suggests that taking vitamin B3 (500 mg twice daily) can reduce the risk of actinic keratoses and non-melanoma skin cancers. Side effects of vitamin B3 include flushing and headache. Oral Minoxidil Counseling- I discussed with the patient the risks of oral minoxidil including but not limited to shortness of breath, swelling of the feet or ankles, dizziness, lightheadedness, unwanted hair growth and allergic reaction.  The patient verbalized understanding of the proper use and possible adverse effects of oral minoxidil.  All of the patient's questions and concerns were addressed. Colchicine Counseling:  Patient counseled regarding adverse effects including but not limited to stomach upset (nausea, vomiting, stomach pain, or diarrhea).  Patient instructed to limit alcohol consumption while taking this medication.  Colchicine may reduce blood counts especially with prolonged use.  The patient understands that monitoring of kidney function and blood counts may be required, especially at baseline. The patient verbalized understanding of the proper use and possible adverse effects of colchicine.  All of the patient's questions and concerns were addressed. Benzoyl Peroxide Counseling: Patient counseled that medicine may cause skin irritation and bleach clothing.  In the event of skin irritation, the patient was advised to reduce the amount of the drug applied or use it less frequently.   The patient verbalized understanding of the proper use and possible adverse effects of benzoyl peroxide.  All of the patient's questions and concerns were addressed. Cephalexin Pregnancy And Lactation Text: This medication is Pregnancy Category B and considered safe during pregnancy.  It is also excreted in breast milk but can be used safely for shorter doses. Topical Ketoconazole Counseling: Patient counseled that this medication may cause skin irritation or allergic reactions.  In the event of skin irritation, the patient was advised to reduce the amount of the drug applied or use it less frequently.   The patient verbalized understanding of the proper use and possible adverse effects of ketoconazole.  All of the patient's questions and concerns were addressed. Valtrex Counseling: I discussed with the patient the risks of valacyclovir including but not limited to kidney damage, nausea, vomiting and severe allergy.  The patient understands that if the infection seems to be worsening or is not improving, they are to call. Clindamycin Pregnancy And Lactation Text: This medication can be used in pregnancy if certain situations. Clindamycin is also present in breast milk. Nsaids Pregnancy And Lactation Text: These medications are considered safe up to 30 weeks gestation. It is excreted in breast milk. Albendazole Counseling:  I discussed with the patient the risks of albendazole including but not limited to cytopenia, kidney damage, nausea/vomiting and severe allergy.  The patient understands that this medication is being used in an off-label manner. Topical Sulfur Applications Pregnancy And Lactation Text: This medication is Pregnancy Category C and has an unknown safety profile during pregnancy. It is unknown if this topical medication is excreted in breast milk. Cyclosporine Pregnancy And Lactation Text: This medication is Pregnancy Category C and it isn't know if it is safe during pregnancy. This medication is excreted in breast milk. Taltz Counseling: I discussed with the patient the risks of ixekizumab including but not limited to immunosuppression, serious infections, worsening of inflammatory bowel disease and drug reactions.  The patient understands that monitoring is required including a PPD at baseline and must alert us or the primary physician if symptoms of infection or other concerning signs are noted. Detail Level: Zone Benzoyl Peroxide Pregnancy And Lactation Text: This medication is Pregnancy Category C. It is unknown if benzoyl peroxide is excreted in breast milk. Oral Minoxidil Pregnancy And Lactation Text: This medication should only be used when clearly needed if you are pregnant, attempting to become pregnant or breast feeding. Valtrex Pregnancy And Lactation Text: this medication is Pregnancy Category B and is considered safe during pregnancy. This medication is not directly found in breast milk but it's metabolite acyclovir is present. Odomzo Counseling- I discussed with the patient the risks of Odomzo including but not limited to nausea, vomiting, diarrhea, constipation, weight loss, changes in the sense of taste, decreased appetite, muscle spasms, and hair loss.  The patient verbalized understanding of the proper use and possible adverse effects of Odomzo.  All of the patient's questions and concerns were addressed. Clindamycin Counseling: I counseled the patient regarding use of clindamycin as an antibiotic for prophylactic and/or therapeutic purposes. Clindamycin is active against numerous classes of bacteria, including skin bacteria. Side effects may include nausea, diarrhea, gastrointestinal upset, rash, hives, yeast infections, and in rare cases, colitis. Dapsone Counseling: I discussed with the patient the risks of dapsone including but not limited to hemolytic anemia, agranulocytosis, rashes, methemoglobinemia, kidney failure, peripheral neuropathy, headaches, GI upset, and liver toxicity.  Patients who start dapsone require monitoring including baseline LFTs and weekly CBCs for the first month, then every month thereafter.  The patient verbalized understanding of the proper use and possible adverse effects of dapsone.  All of the patient's questions and concerns were addressed. Nsaids Counseling: NSAID Counseling: I discussed with the patient that NSAIDs should be taken with food. Prolonged use of NSAIDs can result in the development of stomach ulcers.  Patient advised to stop taking NSAIDs if abdominal pain occurs.  The patient verbalized understanding of the proper use and possible adverse effects of NSAIDs.  All of the patient's questions and concerns were addressed. Albendazole Pregnancy And Lactation Text: This medication is Pregnancy Category C and it isn't known if it is safe during pregnancy. It is also excreted in breast milk. Topical Sulfur Applications Counseling: Topical Sulfur Counseling: Patient counseled that this medication may cause skin irritation or allergic reactions.  In the event of skin irritation, the patient was advised to reduce the amount of the drug applied or use it less frequently.   The patient verbalized understanding of the proper use and possible adverse effects of topical sulfur application.  All of the patient's questions and concerns were addressed. Methotrexate Counseling:  Patient counseled regarding adverse effects of methotrexate including but not limited to nausea, vomiting, abnormalities in liver function tests. Patients may develop mouth sores, rash, diarrhea, and abnormalities in blood counts. The patient understands that monitoring is required including LFT's and blood counts.  There is a rare possibility of scarring of the liver and lung problems that can occur when taking methotrexate. Persistent nausea, loss of appetite, pale stools, dark urine, cough, and shortness of breath should be reported immediately. Patient advised to discontinue methotrexate treatment at least three months before attempting to become pregnant.  I discussed the need for folate supplements while taking methotrexate.  These supplements can decrease side effects during methotrexate treatment. The patient verbalized understanding of the proper use and possible adverse effects of methotrexate.  All of the patient's questions and concerns were addressed. Mirvaso Counseling: Mirvaso is a topical medication which can decrease superficial blood flow where applied. Side effects are uncommon and include stinging, redness and allergic reactions. Carac Counseling:  I discussed with the patient the risks of Carac including but not limited to erythema, scaling, itching, weeping, crusting, and pain. Otezla Counseling: The side effects of Otezla were discussed with the patient, including but not limited to worsening or new depression, weight loss, diarrhea, nausea, upper respiratory tract infection, and headache. Patient instructed to call the office should any adverse effect occur.  The patient verbalized understanding of the proper use and possible adverse effects of Otezla.  All the patient's questions and concerns were addressed. Fluconazole Counseling:  Patient counseled regarding adverse effects of fluconazole including but not limited to headache, diarrhea, nausea, upset stomach, liver function test abnormalities, taste disturbance, and stomach pain.  There is a rare possibility of liver failure that can occur when taking fluconazole.  The patient understands that monitoring of LFTs and kidney function test may be required, especially at baseline. The patient verbalized understanding of the proper use and possible adverse effects of fluconazole.  All of the patient's questions and concerns were addressed. Dapsone Pregnancy And Lactation Text: This medication is Pregnancy Category C and is not considered safe during pregnancy or breast feeding. Dutasteride Pregnancy And Lactation Text: This medication is absolutely contraindicated in women, especially during pregnancy and breast feeding. Feminization of male fetuses is possible if taking while pregnant. Oxybutynin Counseling:  I discussed with the patient the risks of oxybutynin including but not limited to skin rash, drowsiness, dry mouth, difficulty urinating, and blurred vision. Doxycycline Pregnancy And Lactation Text: This medication is Pregnancy Category D and not consider safe during pregnancy. It is also excreted in breast milk but is considered safe for shorter treatment courses. Humira Counseling:  I discussed with the patient the risks of adalimumab including but not limited to myelosuppression, immunosuppression, autoimmune hepatitis, demyelinating diseases, lymphoma, and serious infections.  The patient understands that monitoring is required including a PPD at baseline and must alert us or the primary physician if symptoms of infection or other concerning signs are noted. Calcipotriene Counseling:  I discussed with the patient the risks of calcipotriene including but not limited to erythema, scaling, itching, and irritation. Griseofulvin Counseling:  I discussed with the patient the risks of griseofulvin including but not limited to photosensitivity, cytopenia, liver damage, nausea/vomiting and severe allergy.  The patient understands that this medication is best absorbed when taken with a fatty meal (e.g., ice cream or french fries). Tremfya Counseling: I discussed with the patient the risks of guselkumab including but not limited to immunosuppression, serious infections, worsening of inflammatory bowel disease and drug reactions.  The patient understands that monitoring is required including a PPD at baseline and must alert us or the primary physician if symptoms of infection or other concerning signs are noted. Ivermectin Counseling:  Patient instructed to take medication on an empty stomach with a full glass of water.  Patient informed of potential adverse effects including but not limited to nausea, diarrhea, dizziness, itching, and swelling of the extremities or lymph nodes.  The patient verbalized understanding of the proper use and possible adverse effects of ivermectin.  All of the patient's questions and concerns were addressed. Methotrexate Pregnancy And Lactation Text: This medication is Pregnancy Category X and is known to cause fetal harm. This medication is excreted in breast milk. Doxycycline Counseling:  Patient counseled regarding possible photosensitivity and increased risk for sunburn.  Patient instructed to avoid sunlight, if possible.  When exposed to sunlight, patients should wear protective clothing, sunglasses, and sunscreen.  The patient was instructed to call the office immediately if the following severe adverse effects occur:  hearing changes, easy bruising/bleeding, severe headache, or vision changes.  The patient verbalized understanding of the proper use and possible adverse effects of doxycycline.  All of the patient's questions and concerns were addressed. Erivedge Counseling- I discussed with the patient the risks of Erivedge including but not limited to nausea, vomiting, diarrhea, constipation, weight loss, changes in the sense of taste, decreased appetite, muscle spasms, and hair loss.  The patient verbalized understanding of the proper use and possible adverse effects of Erivedge.  All of the patient's questions and concerns were addressed. Otezla Pregnancy And Lactation Text: This medication is Pregnancy Category C and it isn't known if it is safe during pregnancy. It is unknown if it is excreted in breast milk. Erythromycin Counseling:  I discussed with the patient the risks of erythromycin including but not limited to GI upset, allergic reaction, drug rash, diarrhea, increase in liver enzymes, and yeast infections. Wartpeel Counseling:  I discussed with the patient the risks of Wartpeel including but not limited to erythema, scaling, itching, weeping, crusting, and pain. Dutasteride Male Counseling: Dustasteride Counseling:  I discussed with the patient the risks of use of dutasteride including but not limited to decreased libido, decreased ejaculate volume, and gynecomastia. Women who can become pregnant should not handle medication.  All of the patient's questions and concerns were addressed. Calcipotriene Pregnancy And Lactation Text: This medication has not been proven safe during pregnancy. It is unknown if this medication is excreted in breast milk. Picato Counseling:  I discussed with the patient the risks of Picato including but not limited to erythema, scaling, itching, weeping, crusting, and pain. Griseofulvin Pregnancy And Lactation Text: This medication is Pregnancy Category X and is known to cause serious birth defects. It is unknown if this medication is excreted in breast milk but breast feeding should be avoided. Prednisone Counseling:  I discussed with the patient the risks of prolonged use of prednisone including but not limited to weight gain, insomnia, osteoporosis, mood changes, diabetes, susceptibility to infection, glaucoma and high blood pressure.  In cases where prednisone use is prolonged, patients should be monitored with blood pressure checks, serum glucose levels and an eye exam.  Additionally, the patient may need to be placed on GI prophylaxis, PCP prophylaxis, and calcium and vitamin D supplementation and/or a bisphosphonate.  The patient verbalized understanding of the proper use and the possible adverse effects of prednisone.  All of the patient's questions and concerns were addressed.

## 2025-05-26 NOTE — PROGRESS NOTES
[facility] - PODIATRY  [address] [facility]  [address]  [place] [address]  Dept: [phone]  Dept Fax: [phone]    Chidi Martin Jr., DPM     Assessment:   MDM    Coding  1. Ingrown toenail of left foot  Nail Removal      2. Ingrown toenail of right foot            Plan:     Nail Removal    Date/Time: 5/26/2025 8:00 AM    Performed by: Chidi Martin Jr., DPM  Authorized by: Chidi Martin Jr., DPM    Consent Done?:  Yes (Verbal)  Time out: Immediately prior to the procedure a time out was called    Prep: patient was prepped and draped in usual sterile fashion    Location:     Location:  Left foot    Location detail:  Left big toe  Anesthesia:     Anesthesia:  Digital block    Local anesthetic:  Bupivacaine 0.5% without epinephrine    Anesthetic total (ml):  4  Procedure Details:     Preparation:  Skin prepped with alcohol, skin prepped with Betadine and sterile field established    Amount removed:  Complete    Wedge excision of skin of nail fold: Yes      Nail bed sutured?: No      Nail matrix removed:  None    Removed nail replaced and anchored: No      Dressing applied:  4x4, antibiotic ointment, gauze roll, petrolatum-impregnated gauze and dressing applied    Patient tolerance:  Patient tolerated the procedure well with no immediate complications      Raquel was seen today for ingrown toenail.    Diagnoses and all orders for this visit:    Ingrown toenail of left foot  -     Nail Removal    Ingrown toenail of right foot        -pt seen, evaluated, and managed  -dx discussed in detail. All questions/concerns addressed  -all tx options discussed. All alternatives, risks, benefits of all txs discussed  -The patient was educated regarding the above diagnosis.   -discussed ingrowing toenails and all tx options. Pt opts for avulsion L  -pt to perform epsom salt or betadine soaks once daily x 2wks. Written instructions dispensed  -keep toe covered with triple abx  ointment + bandaid x 2wks    Rx dispensed: none  Referrals: none  -WB: wbat      Follow up in about 4 weeks (around 6/23/2025).    Subjective:      Patient ID: Raquel Finch is a 60 y.o. female.    Chief Complaint:   Chief Complaint   Patient presents with    Ingrown Toenail     Left great toenail ingrown       CC - ingrown nail: Patient presents to the clinic complaining of painful ingrown toenail on both feet. Patients rates pain 7/10 on pain scale. patient seeking tx options.    5/26/25:  Hx as above. S/p R nail avulsion. Wants same px to L foot.    Ingrown Toenail        Last Podiatry Enc: 4/30/2025  Last Enc w/ Me: 1/24/2023    Outside reports reviewed: historical medical records.  Family hx: as below  Past Medical History:   Diagnosis Date    Anemia     Arthritis     Asthma     Colon polyps 04/2012    Elevated CPK     Encounter for blood transfusion     Hypertension     Kidney stones     Osteoporosis     Personal history of colonic polyps 01/24/2023    PUD (peptic ulcer disease)      Past Surgical History:   Procedure Laterality Date    COLONOSCOPY N/A 12/13/2017    Procedure: COLONOSCOPY;  Surgeon: Laxmi Crow MD;  Location: [facility];  Service: Endoscopy;  Laterality: N/A;    COLONOSCOPY N/A 1/24/2023    Procedure: COLONOSCOPY;  Surgeon: Lima Rojas MD;  Location: [facility];  Service: Endoscopy;  Laterality: N/A;    COLONOSCOPY W/ POLYPECTOMY      COLONOSCOPY W/ POLYPECTOMY      CYSTOSCOPY W/ STONE MANIPULATION Right 03/02/2017    DILATION AND CURETTAGE OF UTERUS      ESOPHAGOGASTRODUODENOSCOPY      ESOPHAGOGASTRODUODENOSCOPY      ESOPHAGOGASTRODUODENOSCOPY N/A 5/25/2023    Procedure: EGD (ESOPHAGOGASTRODUODENOSCOPY);  Surgeon: Lima Rojas MD;  Location: [facility];  Service: Endoscopy;  Laterality: N/A;    TUBAL LIGATION       Family History   Problem Relation Name Age of Onset    Hypertension Mother      Heart disease Mother      Hypertension Father      Stroke Father       Current  "Medications[1]  Review of patient's allergies indicates:  No Known Allergies  Social History[2]    ROS    REVIEW OF SYSTEMS: Negative as documented below as well as positive findings in bold.       Constitutional  Respiratory  Gastrointestinal  Skin   - Fever - Cough - Heartburn - Rash   - Chills - Spit blood - Nausea - Itching   - Weight Loss - Shortness of breath - Vomiting - Nail pain   - Malaise/Fatigue - Wheezing - Abdominal Pain  Wound/Ulcer   - Weight Gain   - Blood in Stool  Poor wound healing       - Diarrhea          Cardiovascular  Genitourinary  Neurological  HEENT   - Chest Pain - Dysuria - Burning Sensation of feet - Headache   - Palpitations - Hematuria - Tingling / Paresthesia - Congestion   - Pain at night in legs - Flank Pain - Dizziness - Sore Throat   - Cramping   - Tremor - Blurred Vision   - Leg Swelling   - Sensory Change - Double Vision   - Dizzy when standing   - Speech Change - Eye Redness       - Focal Weakness - Dry Eyes       - Loss of Consciousness          Endocrine  Musculoskeletal  Psychiatric   - Cold intolerance - Muscle Pain - Depression   - Heat intolerance - Neck Pain - Insomnia   - Anemia - Joint Pain - Memory Loss   -  Easy bruising, bleeding - Heel pain - Anxiety      Toe Pain        Leg/Ankle/Foot Pain         Objective:     Ht 5' 8" (1.727 m)   Wt 96.6 kg (212 lb 15.4 oz)   BMI 32.38 kg/m²   Vitals:    05/26/25 0803   Weight: 96.6 kg (212 lb 15.4 oz)   Height: 5' 8" (1.727 m)   PainSc:   2         Physical Exam    General Appearance:   Patient appears well developed, well nourished  Patient appears stated age    Psychiatric:   Patient is oriented to time, place, and person.  Patient has appropriate mood and affect    Neck:  Trachea Midline  No visible masses    Respiratory/Ears:  No distress or labored breathing.  Able to differentiate between normal talking voice and whisper.  Able to follow commands    Eyes:  Visual Acuity intact  Lids and conjunctivae normal. No " discoloration noted.    Foot Exam  Physical Exam  Ortho Exam  Ortho/SPM Exam  Physical Exam  Neurological Exam    B/l LE exam con't:  V:  DP 2/4, PT 2/4   CRT< 3s to all digits tested   Tibial and popliteal lymph nodes are w/o abnormality    N:  Patient displays normal ankle reflexes   SILT in SP/DP/T/Dora/Saph distributions    Ortho: +Motor EHL/FHL/TA/GA   +TTP b/l great toe  Compartments soft/compressible. No pain on passive stretch of big toe. No calf  Pain.    Derm:  skin intact, skin warm and dry, skin without ulcers or lesions, skin without induration, left, great toe ingrowing and incurvated, R great toenail px site healing well w/o signs of infection    Imaging / Labs:      No results found.      Note: This was dictated using a computer transcription program. Although proofread, it may contain computer transcription errors and phonetic errors. Other human proofreading errors may also exist. Corrections may be performed at a later time. Please contact us for any clarification if needed.    Chidi Martin DPM  [facility]           [1]   Current Outpatient Medications   Medication Sig Dispense Refill    albuterol (PROVENTIL/VENTOLIN HFA) 90 mcg/actuation inhaler 2 puffs 4 (four) times daily as needed.      ALPRAZolam (XANAX) 0.5 MG tablet Take 0.5 mg by mouth 2 (two) times daily as needed.      aspirin 81 MG Chew Take 81 mg by mouth once daily.      atorvastatin (LIPITOR) 20 MG tablet TK 1 T PO  QHS      budesonide-formoterol 160-4.5 mcg (SYMBICORT) 160-4.5 mcg/actuation HFAA 2 puffs as needed.      cetirizine (ZYRTEC) 10 MG tablet TAKE 1 TABLET BY MOUTH DAILY AS NEEDED FOR SINUS      fluticasone propionate (FLONASE) 50 mcg/actuation nasal spray 2 sprays (100 mcg total) by Each Nostril route once daily. (Patient not taking: Reported on 4/30/2025) 18.2 mL 11    fluticasone propionate (FLOVENT HFA) 220 mcg/actuation inhaler 2 puffs as needed.      gabapentin (NEURONTIN) 300 MG  capsule Take 300 mg by mouth nightly.      hydroCHLOROthiazide (MICROZIDE) 12.5 mg capsule Take 12.5 mg by mouth daily as needed.      HYDROcodone-acetaminophen (NORCO) 5-325 mg per tablet Take 1 tablet by mouth every 6 (six) hours as needed for Pain. (Patient not taking: Reported on 4/30/2025) 12 tablet 0    ibandronate (BONIVA) 150 mg tablet Take 150 mg by mouth every 30 days.      ibuprofen (ADVIL,MOTRIN) 600 MG tablet Take 1 tablet (600 mg total) by mouth every 6 (six) hours as needed. 20 tablet 0    inulin (FIBER GUMMIES ORAL) Take by mouth.      meclizine (ANTIVERT) 25 mg tablet Take 1 tablet (25 mg total) by mouth 3 (three) times daily as needed for Dizziness. 30 tablet 0    methocarbamoL (ROBAXIN) 500 MG Tab Take 1 tablet (500 mg total) by mouth 3 (three) times daily as needed (Pain/muscle spasm). (Patient not taking: Reported on 4/30/2025) 15 tablet 0    montelukast (SINGULAIR) 10 mg tablet Take 10 mg by mouth every evening.      nebulizer and compressor Domonique as per administration instructions      ondansetron (ZOFRAN) 4 MG tablet Take 1 tablet (4 mg total) by mouth every 6 (six) hours. 12 tablet 0    pantoprazole (PROTONIX) 40 MG tablet Take 40 mg by mouth once daily.      paroxetine (PAXIL) 40 MG tablet Take 40 mg by mouth once daily.      tiZANidine (ZANAFLEX) 4 MG tablet Take by mouth every 8 (eight) hours as needed.       No current facility-administered medications for this visit.   [2]   Social History  Socioeconomic History    Marital status:    Tobacco Use    Smoking status: Never    Smokeless tobacco: Never   Substance and Sexual Activity    Alcohol use: No     Comment: rarely    Drug use: No    Sexual activity: Yes     Partners: Male     Social Drivers of Health     Financial Resource Strain: Low Risk  (1/11/2025)    Overall Financial Resource Strain (CARDIA)     Difficulty of Paying Living Expenses: Not hard at all   Food Insecurity: Food Insecurity Present (1/11/2025)    Hunger Vital  Sign     Worried About Running Out of Food in the Last Year: Never true     Ran Out of Food in the Last Year: Sometimes true   Transportation Needs: No Transportation Needs (1/7/2025)    Received from [facility]    PRAPARE - Transportation     Lack of Transportation (Medical): No     Lack of Transportation (Non-Medical): No   Physical Activity: Insufficiently Active (1/11/2025)    Exercise Vital Sign     Days of Exercise per Week: 1 day     Minutes of Exercise per Session: 10 min   Stress: No Stress Concern Present (1/11/2025)    [employer] [place] [employer] - Occupational Stress Questionnaire     Feeling of Stress : Not at all   Housing Stability: Unknown (1/11/2025)    Housing Stability Vital Sign     Unable to Pay for Housing in the Last Year: No

## 2025-06-25 ENCOUNTER — OFFICE VISIT (OUTPATIENT)
Dept: PODIATRY | Facility: CLINIC | Age: 60
End: 2025-06-25
Payer: MEDICAID

## 2025-06-25 DIAGNOSIS — L60.0 INGROWN TOENAIL OF RIGHT FOOT: ICD-10-CM

## 2025-06-25 DIAGNOSIS — L60.0 INGROWN TOENAIL OF LEFT FOOT: Primary | ICD-10-CM

## 2025-06-25 PROCEDURE — 99213 OFFICE O/P EST LOW 20 MIN: CPT | Mod: PBBFAC,PN | Performed by: PODIATRIST

## 2025-06-25 PROCEDURE — 99999 PR PBB SHADOW E&M-EST. PATIENT-LVL III: CPT | Mod: PBBFAC,,, | Performed by: PODIATRIST

## 2025-06-25 PROCEDURE — 1159F MED LIST DOCD IN RCRD: CPT | Mod: CPTII,,, | Performed by: PODIATRIST

## 2025-06-25 PROCEDURE — 99213 OFFICE O/P EST LOW 20 MIN: CPT | Mod: S$PBB,,, | Performed by: PODIATRIST

## 2025-06-25 NOTE — PATIENT INSTRUCTIONS
Instructions for Care after Ingrown Nail removal    General Information: Stay off your feet as much as possible today. You may wear a surgical shoe, sandal or any open toed shoe that does not squeeze, constrict or put pressure on your toe(s). Your toe(s) may remain numb for up to 2-24 hours after the procedure. Although most patients can wear a closed loose fitting shoe after the first week, the toe will heal faster the more you use the open toed shoe in the first 2-3  weeks. Please contact our office if you have any questions or concerns.    Bleeding: Slight bleeding, discoloration and drainage are normal. Due to the chemical used there may be some yellow-clear drainage coming from the toe for 2-3 weeks.    Discomfort: You can elevate your foot to help alleviate minor swelling, bleeding and discomfort. You may also take Advil, Tylenol or other over the counter pain medications to help alleviate pain. Call our office if the pain is not well controlled. Most patients have very little discomfort as long as they minimize their walking for the first 24 hours and do not bump the toe.    Removing the Bandage: Starting the day after the procedure, carefully remove the dressing and shower or bathe as normal. It is Ok to get the bandage soaking wet in the shower and when you remove it, it should not stick to the surgery site.    Dressing Options- Traditional Method:  1. Soaking two times a day in WARM water with Epsom salts or diluted Povidone Iodine  (Betadine) for 15-20 minutes. You will need to purchase these products from the pharmacy.  2. Dry toe then apply an antibiotic cream or ointment such as Neosporin or Polysporin plus or  Garamycin and cover with a 2 x 2 inch size gauze and then secure with a 1 inch band aid.  3. In the second week, take the dressing off at bedtime to air dry the toe.  4. If the toe is infected take the Antibiotic Pills as directed until finished.      Ingrown Toenail        What Is an Ingrown  Toenail?    When a toenail is ingrown, it is curved and grows into the skin, usually at the nail borders (the sides of the nail). This digging in of the nail irritates the skin, often creating pain, redness, swelling and warmth in the toe.    If an ingrown nail causes a break in the skin, bacteria may enter and cause an infection in the area, which is often marked by drainage and a foul odor. However, even if the toe is not painful, red, swollen or warm, a nail that curves downward into the skin can progress to an infection.    Ingrown toenail and normal toenail    Causes  Causes of ingrown toenails include:    Heredity. In many people, the tendency for ingrown toenails is inherited.  Trauma. Sometimes an ingrown toenail is the result of trauma, such as stubbing your toe, having an object fall on your toe or engaging in activities that involve repeated pressure on the toes, such as kicking or running.  Improper trimming. The most common cause of ingrown toenails is cutting your nails too short. This encourages the skin next to the nail to fold over the nail.   Improperly sized footwear. Ingrown toenails can result from wearing socks and shoes that are tight or short.  Nail conditions. Ingrown toenails can be caused by nail problems, such as fungal infections or losing a nail due to trauma.     Treatment  Sometimes initial treatment for ingrown toenails can be safely performed at home. However, home treatment is strongly discouraged if an infection is suspected or for those who have medical conditions that put feet at high risk, such as diabetes, nerve damage in the foot or poor circulation.        Ingrown toenail before and after treatment    Home Care  If you do not have an infection or any of the above medical conditions, you can soak your foot in room-temperature water (adding Epsom salt may be recommended by your doctor) and gently massage the side of the nail fold to help reduce the inflammation.    Avoid  attempting bathroom surgery. Repeated cutting of the nail can cause the condition to worsen over time. If your symptoms fail to improve, it is time to see a foot and ankle surgeon.    Physician Care  After examining the toe, the foot and ankle surgeon will select the treatment best suited for you. If an infection is present, an oral antibiotic may be prescribed.    Sometimes a minor surgical procedure, often performed in the office, will ease the pain and remove the offending nail. After applying a local anesthetic, the doctor removes part of the nails side border. Some nails may become ingrown again, requiring removal of the nail root.    Following the nail procedure, a light bandage will be applied. Most people experience very little pain after surgery and may resume normal activity the next day. If your surgeon has prescribed an oral antibiotic, be sure to take all the medication, even if your symptoms have improved.    Preventing Ingrown Toenails  Many cases of ingrown toenails may be prevented by:    Proper trimming. Cut toenails in a fairly straight line, and do not cut them too short. You should be able to get your fingernail under the sides and end of the nail.  Well-fitting shoes and socks. Do not wear shoes that are short or tight in the toe area. Avoid shoes that are loose because they too cause pressure on the toes, especially when running or walking briskly.               What You Should Know About Home Treatment  Do not cut a notch in the nail. Contrary to what some people believe, this does not reduce the tendency for the nail to curve downward.  Do not repeatedly trim nail borders. Repeated trimming does not change the way the nail grows and can make the condition worse.  Do not place cotton under the nail. Not only does this not relieve the pain, it provides a place for harmful bacteria to grow, resulting in infection.  Over-the-counter medications are ineffective. Topical medications may mask  the pain, but they do not correct the underlying problem.        Understanding Ingrown Toenails    An ingrown nail is the result of a nail growing into the skin that surrounds it. This often occurs at either edge of the big toe. Ingrown nails may be caused by improper trimming, inherited nail deformities, injuries, fungal infections, or pressure.  Symptoms  Ingrown nails may cause pain at the tip of the toe or all the way to the base of the toe. The pain is often worse while walking. An ingrown nail may also lead to infection, inflammation, or a more serious condition. If its infected, you might see pus or redness.  Evaluation  To determine the extent of your problem, your healthcare provider examines and possibly presses the painful area. If other problems are suspected, blood tests, cultures, and X-rays may be done as well.  Treatment  If the nail isnt infected, your healthcare provider may trim the corner of it to help relieve your symptoms. He or she may need to remove one side of your nail back to the cuticle. The base of the nail may then be treated with a chemical to keep the ingrown part from growing back. Severe infections or ingrown nails may require antibiotics and temporary or permanent removal of a portion of the nail. To prevent pain, a local anesthetic may be used in these procedures. This treatment is usually done at your healthcare provider's office.  Prevention  Many nail problems can be prevented by wearing the right shoes and trimming your nails properly. To help avoid infection, keep your feet clean and dry. If you have diabetes, talk with your healthcare provider before doing any foot self-care.  The right shoes: Get your feet measured (your size may change as you age). Wear shoes that are supportive and roomy enough for your toes to wiggle. Look for shoes made of natural materials such as leather, which allow your feet to breathe.  Proper trimming: To avoid problems, trim your toenails  straight across without cutting down into the corners. If you cant trim your own nails, ask your healthcare provider to do so for you.  Date Last Reviewed: 10/1/2016  © 9279-9783 [employer]. [address] [place] [address], [place] [address]. All rights reserved. This information is not intended as a substitute for professional medical care. Always follow your healthcare professional's instructions.

## 2025-06-25 NOTE — PROGRESS NOTES
[facility] - PODIATRY  [address] [facility]  [address]  [place] [address]  Dept: [phone]  Dept Fax: [phone]    Chidi Martin Jr., DPM     Assessment:   MDM    Coding  1. Ingrown toenail of left foot        2. Ingrown toenail of right foot            Plan:     Nail Removal    Date/Time: 6/25/2025 3:15 PM    Performed by: Chidi Martin Jr., DPM  Authorized by: Chidi Martin Jr., DPM    Consent Done?:  Yes (Verbal)  Time out: Immediately prior to the procedure a time out was called    Prep: patient was prepped and draped in usual sterile fashion    Location:     Location:  Left foot    Location detail:  Left big toe  Anesthesia:     Anesthesia:  Digital block    Local anesthetic:  Bupivacaine 0.5% without epinephrine    Anesthetic total (ml):  4  Procedure Details:     Preparation:  Skin prepped with alcohol, skin prepped with Betadine and sterile field established    Amount removed:  Complete    Wedge excision of skin of nail fold: Yes      Nail bed sutured?: No      Nail matrix removed:  None    Removed nail replaced and anchored: No      Dressing applied:  4x4, antibiotic ointment, gauze roll, petrolatum-impregnated gauze and dressing applied    Patient tolerance:  Patient tolerated the procedure well with no immediate complications      Raquel was seen today for follow-up.    Diagnoses and all orders for this visit:    Ingrown toenail of left foot    Ingrown toenail of right foot    Other orders  -     Nail Removal        -pt seen, evaluated, and managed  -dx discussed in detail. All questions/concerns addressed  -all tx options discussed. All alternatives, risks, benefits of all txs discussed  -The patient was educated regarding the above diagnosis.   -discussed ingrowing toenails and all tx options. Focused on prevention going fwd  -pt to perform epsom salt or betadine soaks once daily x 2wks. Written instructions dispensed  -keep toe covered with triple  abx ointment + bandaid x 2wks    Rx dispensed: none  Referrals: none  -WB: wbat      Follow up if symptoms worsen or fail to improve.    Subjective:      Patient ID: Raquel Finch is a 60 y.o. female.    Chief Complaint:   Chief Complaint   Patient presents with    Follow-up     Ingrown removal       CC - ingrown nail: Patient presents to the clinic complaining of painful ingrown toenail on both feet. Patients rates pain 7/10 on pain scale. patient seeking tx options.    6/25/25:  Hx as above. S/p b/l nail avulsions    Ingrown Toenail        Last Podiatry Enc: 5/26/2025  Last Enc w/ Me: 1/24/2023    Outside reports reviewed: historical medical records.  Family hx: as below  Past Medical History:   Diagnosis Date    Anemia     Arthritis     Asthma     Colon polyps 04/2012    Elevated CPK     Encounter for blood transfusion     Hypertension     Kidney stones     Osteoporosis     Personal history of colonic polyps 01/24/2023    PUD (peptic ulcer disease)      Past Surgical History:   Procedure Laterality Date    COLONOSCOPY N/A 12/13/2017    Procedure: COLONOSCOPY;  Surgeon: Laxmi Crow MD;  [facility]: [facility];  Service: Endoscopy;  Laterality: N/A;    COLONOSCOPY N/A 1/24/2023    Procedure: COLONOSCOPY;  Surgeon: Lima Rojas MD;  Location: [facility];  Service: Endoscopy;  Laterality: N/A;    COLONOSCOPY W/ POLYPECTOMY      COLONOSCOPY W/ POLYPECTOMY      CYSTOSCOPY W/ STONE MANIPULATION Right 03/02/2017    DILATION AND CURETTAGE OF UTERUS      ESOPHAGOGASTRODUODENOSCOPY      ESOPHAGOGASTRODUODENOSCOPY      ESOPHAGOGASTRODUODENOSCOPY N/A 5/25/2023    Procedure: EGD (ESOPHAGOGASTRODUODENOSCOPY);  Surgeon: Lima Rojas MD;  Location: [facility];  Service: Endoscopy;  Laterality: N/A;    TUBAL LIGATION       Family History   Problem Relation Name Age of Onset    Hypertension Mother      Heart disease Mother      Hypertension Father      Stroke Father       Current Medications[1]  Review of patient's  allergies indicates:  No Known Allergies  Social History[2]    ROS    REVIEW OF SYSTEMS: Negative as documented below as well as positive findings in bold.       Constitutional  Respiratory  Gastrointestinal  Skin   - Fever - Cough - Heartburn - Rash   - Chills - Spit blood - Nausea - Itching   - Weight Loss - Shortness of breath - Vomiting - Nail pain   - Malaise/Fatigue - Wheezing - Abdominal Pain  Wound/Ulcer   - Weight Gain   - Blood in Stool  Poor wound healing       - Diarrhea          Cardiovascular  Genitourinary  Neurological  HEENT   - Chest Pain - Dysuria - Burning Sensation of feet - Headache   - Palpitations - Hematuria - Tingling / Paresthesia - Congestion   - Pain at night in legs - Flank Pain - Dizziness - Sore Throat   - Cramping   - Tremor - Blurred Vision   - Leg Swelling   - Sensory Change - Double Vision   - Dizzy when standing   - Speech Change - Eye Redness       - Focal Weakness - Dry Eyes       - Loss of Consciousness          Endocrine  Musculoskeletal  Psychiatric   - Cold intolerance - Muscle Pain - Depression   - Heat intolerance - Neck Pain - Insomnia   - Anemia - Joint Pain - Memory Loss   -  Easy bruising, bleeding - Heel pain - Anxiety      Toe Pain        Leg/Ankle/Foot Pain         Objective:     There were no vitals taken for this visit.  Vitals:    06/25/25 1456   PainSc:   2           Physical Exam    General Appearance:   Patient appears well developed, well nourished  Patient appears stated age    Psychiatric:   Patient is oriented to time, place, and person.  Patient has appropriate mood and affect    Neck:  Trachea Midline  No visible masses    Respiratory/Ears:  No distress or labored breathing.  Able to differentiate between normal talking voice and whisper.  Able to follow commands    Eyes:  Visual Acuity intact  Lids and conjunctivae normal. No discoloration noted.    Foot Exam  Physical Exam  Ortho Exam  Ortho/SPM Exam  Physical Exam  Neurological Exam    B/l LE exam  con't:  V:  DP 2/4, PT 2/4   CRT< 3s to all digits tested   Tibial and popliteal lymph nodes are w/o abnormality    N:  Patient displays normal ankle reflexes   SILT in SP/DP/T/Dora/Saph distributions    Ortho: +Motor EHL/FHL/TA/GA   +TTP b/l great toe  Compartments soft/compressible. No pain on passive stretch of big toe. No calf  Pain.    Derm:  skin intact, skin warm and dry, skin without ulcers or lesions, skin without induration, b/l great toenail px site healing well w/o signs of infection    Imaging / Labs:      CV US Lower Extremity Veins Bilateral Insufficiency  Result Date: 6/3/2025    No evidence of DVT.  No evidence of hemodynamically significant venous insufficiency.         Note: This was dictated using a computer transcription program. Although proofread, it may contain computer transcription errors and phonetic errors. Other human proofreading errors may also exist. Corrections may be performed at a later time. Please contact us for any clarification if needed.    Chidi Martin DPM  [facility]             [1]   Current Outpatient Medications   Medication Sig Dispense Refill    albuterol (PROVENTIL/VENTOLIN HFA) 90 mcg/actuation inhaler 2 puffs 4 (four) times daily as needed.      ALPRAZolam (XANAX) 0.5 MG tablet Take 0.5 mg by mouth 2 (two) times daily as needed.      aspirin 81 MG Chew Take 81 mg by mouth once daily.      atorvastatin (LIPITOR) 20 MG tablet TK 1 T PO  QHS      budesonide-formoterol 160-4.5 mcg (SYMBICORT) 160-4.5 mcg/actuation HFAA 2 puffs as needed.      cetirizine (ZYRTEC) 10 MG tablet TAKE 1 TABLET BY MOUTH DAILY AS NEEDED FOR SINUS      fluticasone propionate (FLOVENT HFA) 220 mcg/actuation inhaler 2 puffs as needed.      gabapentin (NEURONTIN) 300 MG capsule Take 300 mg by mouth nightly.      hydroCHLOROthiazide (MICROZIDE) 12.5 mg capsule Take 12.5 mg by mouth daily as needed.      ibandronate (BONIVA) 150 mg tablet Take 150 mg by mouth every 30  days.      ibuprofen (ADVIL,MOTRIN) 600 MG tablet Take 1 tablet (600 mg total) by mouth every 6 (six) hours as needed. 20 tablet 0    inulin (FIBER GUMMIES ORAL) Take by mouth.      meclizine (ANTIVERT) 25 mg tablet Take 1 tablet (25 mg total) by mouth 3 (three) times daily as needed for Dizziness. 30 tablet 0    montelukast (SINGULAIR) 10 mg tablet Take 10 mg by mouth every evening.      nebulizer and compressor Domonique as per administration instructions      pantoprazole (PROTONIX) 40 MG tablet Take 40 mg by mouth once daily.      paroxetine (PAXIL) 40 MG tablet Take 40 mg by mouth once daily.      tiZANidine (ZANAFLEX) 4 MG tablet Take by mouth every 8 (eight) hours as needed.      fluticasone propionate (FLONASE) 50 mcg/actuation nasal spray 2 sprays (100 mcg total) by Each Nostril route once daily. (Patient not taking: Reported on 4/30/2025) 18.2 mL 11    HYDROcodone-acetaminophen (NORCO) 5-325 mg per tablet Take 1 tablet by mouth every 6 (six) hours as needed for Pain. (Patient not taking: Reported on 4/30/2025) 12 tablet 0    methocarbamoL (ROBAXIN) 500 MG Tab Take 1 tablet (500 mg total) by mouth 3 (three) times daily as needed (Pain/muscle spasm). (Patient not taking: Reported on 4/28/2025) 15 tablet 0    ondansetron (ZOFRAN) 4 MG tablet Take 1 tablet (4 mg total) by mouth every 6 (six) hours. 12 tablet 0     No current facility-administered medications for this visit.   [2]   Social History  Socioeconomic History    Marital status:    Tobacco Use    Smoking status: Never    Smokeless tobacco: Never   Substance and Sexual Activity    Alcohol use: No     Comment: rarely    Drug use: No    Sexual activity: Yes     Partners: Male     Social Drivers of Health     Financial Resource Strain: Low Risk  (1/11/2025)    Overall Financial Resource Strain (CARDIA)     Difficulty of Paying Living Expenses: Not hard at all   Food Insecurity: Food Insecurity Present (1/11/2025)    Hunger Vital Sign     Worried About  Running Out of Food in the Last Year: Never true     Ran Out of Food in the Last Year: Sometimes true   Transportation Needs: No Transportation Needs (1/7/2025)    Received from [facility]    PRAPARE - Transportation     Lack of Transportation (Medical): No     Lack of Transportation (Non-Medical): No   Physical Activity: Insufficiently Active (1/11/2025)    Exercise Vital Sign     Days of Exercise per Week: 1 day     Minutes of Exercise per Session: 10 min   Stress: No Stress Concern Present (1/11/2025)    [employer] - Occupational Stress Questionnaire     Feeling of Stress : Not at all   Housing Stability: Unknown (1/11/2025)    Housing Stability Vital Sign     Unable to Pay for Housing in the Last Year: No

## 2025-06-25 NOTE — PROGRESS NOTES
[facility] - PODIATRY  [address]  [address]  [place] [address]  Dept: [phone]  Dept Fax: [phone]    Chidi Martin Jr., DPM     Assessment:   MDM    Coding  1. Ingrown toenail of left foot        2. Ingrown toenail of right foot            Plan:     Roshni García was seen today for follow-up.    Diagnoses and all orders for this visit:    Ingrown toenail of left foot    Ingrown toenail of right foot    Other orders  -     Cancel: Nail Removal        -pt seen, evaluated, and managed  -dx discussed in detail. All questions/concerns addressed  -all tx options discussed. All alternatives, risks, benefits of all txs discussed  -The patient was educated regarding the above diagnosis.   -discussed ingrowing toenails and all tx options. Focused on prevention going fwd  -pt to perform epsom salt or betadine soaks once daily x 2wks. Written instructions dispensed  -keep toe covered with triple abx ointment + bandaid x 2wks    Rx dispensed: none  Referrals: none  -WB: wbat      Follow up if symptoms worsen or fail to improve.    Subjective:      Patient ID: Raquel Finch is a 60 y.o. female.    Chief Complaint:   Chief Complaint   Patient presents with    Follow-up     Ingrown removal       CC - ingrown nail: Patient presents to the clinic complaining of painful ingrown toenail on both feet. Patients rates pain 7/10 on pain scale. patient seeking tx options.    6/25/25:  Hx as above. S/p b/l nail avulsion    Ingrown Toenail    Follow-up        Last Podiatry Enc: 5/26/2025  Last Enc w/ Me: 1/24/2023    Outside reports reviewed: historical medical records.  Family hx: as below  Past Medical History:   Diagnosis Date    Anemia     Arthritis     Asthma     Colon polyps 04/2012    Elevated CPK     Encounter for blood transfusion     Hypertension     Kidney stones     Osteoporosis     Personal history of colonic polyps 01/24/2023    PUD (peptic ulcer disease)       Past Surgical History:   Procedure Laterality Date    COLONOSCOPY N/A 12/13/2017    Procedure: COLONOSCOPY;  Surgeon: Laxmi Crow MD;  Location: [facility] ENDO;  Service: Endoscopy;  Laterality: N/A;    COLONOSCOPY N/A 1/24/2023    Procedure: COLONOSCOPY;  Surgeon: Lima Rojas MD;  Location: [facility] ENDO;  Service: Endoscopy;  Laterality: N/A;    COLONOSCOPY W/ POLYPECTOMY      COLONOSCOPY W/ POLYPECTOMY      CYSTOSCOPY W/ STONE MANIPULATION Right 03/02/2017    DILATION AND CURETTAGE OF UTERUS      ESOPHAGOGASTRODUODENOSCOPY      ESOPHAGOGASTRODUODENOSCOPY      ESOPHAGOGASTRODUODENOSCOPY N/A 5/25/2023    Procedure: EGD (ESOPHAGOGASTRODUODENOSCOPY);  Surgeon: Lima Rojas MD;  Location: [facility] ENDO;  Service: Endoscopy;  Laterality: N/A;    TUBAL LIGATION       Family History   Problem Relation Name Age of Onset    Hypertension Mother      Heart disease Mother      Hypertension Father      Stroke Father       Current Medications[1]  Review of patient's allergies indicates:  No Known Allergies  Social History[2]    ROS    REVIEW OF SYSTEMS: Negative as documented below as well as positive findings in bold.       Constitutional  Respiratory  Gastrointestinal  Skin   - Fever - Cough - Heartburn - Rash   - Chills - Spit blood - Nausea - Itching   - Weight Loss - Shortness of breath - Vomiting - Nail pain   - Malaise/Fatigue - Wheezing - Abdominal Pain  Wound/Ulcer   - Weight Gain   - Blood in Stool  Poor wound healing       - Diarrhea          Cardiovascular  Genitourinary  Neurological  HEENT   - Chest Pain - Dysuria - Burning Sensation of feet - Headache   - Palpitations - Hematuria - Tingling / Paresthesia - Congestion   - Pain at night in legs - Flank Pain - Dizziness - Sore Throat   - Cramping   - Tremor - Blurred Vision   - Leg Swelling   - Sensory Change - Double Vision   - Dizzy when standing   - Speech Change - Eye Redness       - Focal Weakness - Dry Eyes       - Loss of Consciousness           Endocrine  Musculoskeletal  Psychiatric   - Cold intolerance - Muscle Pain - Depression   - Heat intolerance - Neck Pain - Insomnia   - Anemia - Joint Pain - Memory Loss   -  Easy bruising, bleeding - Heel pain - Anxiety      Toe Pain        Leg/Ankle/Foot Pain         Objective:     There were no vitals taken for this visit.  Vitals:    06/25/25 1456   PainSc:   2         Physical Exam    General Appearance:   Patient appears well developed, well nourished  Patient appears stated age    Psychiatric:   Patient is oriented to time, place, and person.  Patient has appropriate mood and affect    Neck:  Trachea Midline  No visible masses    Respiratory/Ears:  No distress or labored breathing.  Able to differentiate between normal talking voice and whisper.  Able to follow commands    Eyes:  Visual Acuity intact  Lids and conjunctivae normal. No discoloration noted.    Foot Exam  Physical Exam  Ortho Exam  Ortho/SPM Exam  Physical Exam  Neurological Exam    B/l LE exam con't:  V:  DP 2/4, PT 2/4   CRT< 3s to all digits tested   Tibial and popliteal lymph nodes are w/o abnormality    N:  Patient displays normal ankle reflexes   SILT in SP/DP/T/Dora/Saph distributions    Ortho: +Motor EHL/FHL/TA/GA   +TTP b/l great toe  Compartments soft/compressible. No pain on passive stretch of big toe. No calf  Pain.    Derm:  skin intact, skin warm and dry, skin without ulcers or lesions, skin without induration, b/l great toenail px site healing well w/o signs of infection    Imaging / Labs:      CV US Lower Extremity Veins Bilateral Insufficiency  Result Date: 6/3/2025    No evidence of DVT.  No evidence of hemodynamically significant venous insufficiency.         Note: This was dictated using a computer transcription program. Although proofread, it may contain computer transcription errors and phonetic errors. Other human proofreading errors may also exist. Corrections may be performed at a later time. Please contact us for any  clarification if needed.    Chidi Martin DPM  [facility]             [1]   Current Outpatient Medications   Medication Sig Dispense Refill    albuterol (PROVENTIL/VENTOLIN HFA) 90 mcg/actuation inhaler 2 puffs 4 (four) times daily as needed.      ALPRAZolam (XANAX) 0.5 MG tablet Take 0.5 mg by mouth 2 (two) times daily as needed.      aspirin 81 MG Chew Take 81 mg by mouth once daily.      atorvastatin (LIPITOR) 20 MG tablet TK 1 T PO  QHS      budesonide-formoterol 160-4.5 mcg (SYMBICORT) 160-4.5 mcg/actuation HFAA 2 puffs as needed.      cetirizine (ZYRTEC) 10 MG tablet TAKE 1 TABLET BY MOUTH DAILY AS NEEDED FOR SINUS      fluticasone propionate (FLOVENT HFA) 220 mcg/actuation inhaler 2 puffs as needed.      gabapentin (NEURONTIN) 300 MG capsule Take 300 mg by mouth nightly.      hydroCHLOROthiazide (MICROZIDE) 12.5 mg capsule Take 12.5 mg by mouth daily as needed.      ibandronate (BONIVA) 150 mg tablet Take 150 mg by mouth every 30 days.      ibuprofen (ADVIL,MOTRIN) 600 MG tablet Take 1 tablet (600 mg total) by mouth every 6 (six) hours as needed. 20 tablet 0    inulin (FIBER GUMMIES ORAL) Take by mouth.      meclizine (ANTIVERT) 25 mg tablet Take 1 tablet (25 mg total) by mouth 3 (three) times daily as needed for Dizziness. 30 tablet 0    montelukast (SINGULAIR) 10 mg tablet Take 10 mg by mouth every evening.      nebulizer and compressor Domonique as per administration instructions      pantoprazole (PROTONIX) 40 MG tablet Take 40 mg by mouth once daily.      paroxetine (PAXIL) 40 MG tablet Take 40 mg by mouth once daily.      tiZANidine (ZANAFLEX) 4 MG tablet Take by mouth every 8 (eight) hours as needed.      fluticasone propionate (FLONASE) 50 mcg/actuation nasal spray 2 sprays (100 mcg total) by Each Nostril route once daily. (Patient not taking: Reported on 4/30/2025) 18.2 mL 11    HYDROcodone-acetaminophen (NORCO) 5-325 mg per tablet Take 1 tablet by mouth every 6 (six)  hours as needed for Pain. (Patient not taking: Reported on 4/30/2025) 12 tablet 0    methocarbamoL (ROBAXIN) 500 MG Tab Take 1 tablet (500 mg total) by mouth 3 (three) times daily as needed (Pain/muscle spasm). (Patient not taking: Reported on 4/28/2025) 15 tablet 0    ondansetron (ZOFRAN) 4 MG tablet Take 1 tablet (4 mg total) by mouth every 6 (six) hours. 12 tablet 0     No current facility-administered medications for this visit.   [2]   Social History  Socioeconomic History    Marital status:    Tobacco Use    Smoking status: Never    Smokeless tobacco: Never   Substance and Sexual Activity    Alcohol use: No     Comment: rarely    Drug use: No    Sexual activity: Yes     Partners: Male     Social Drivers of Health     Financial Resource Strain: Low Risk  (1/11/2025)    Overall Financial Resource Strain (CARDIA)     Difficulty of Paying Living Expenses: Not hard at all   Food Insecurity: Food Insecurity Present (1/11/2025)    Hunger Vital Sign     Worried About Running Out of Food in the Last Year: Never true     Ran Out of Food in the Last Year: Sometimes true   Transportation Needs: No Transportation Needs (1/7/2025)    [facility]    [facility]     Lack of Transportation (Medical): No     Lack of Transportation (Non-Medical): No   Physical Activity: Insufficiently Active (1/11/2025)    Exercise Vital Sign     Days of Exercise per Week: 1 day     Minutes of Exercise per Session: 10 min   Stress: No Stress Concern Present (1/11/2025)    [employer] - Occupational Stress Questionnaire     Feeling of Stress : Not at all   Housing Stability: Unknown (1/11/2025)    Housing Stability Vital Sign     Unable to Pay for Housing in the Last Year: No